# Patient Record
Sex: FEMALE | Race: WHITE | NOT HISPANIC OR LATINO | Employment: OTHER | URBAN - METROPOLITAN AREA
[De-identification: names, ages, dates, MRNs, and addresses within clinical notes are randomized per-mention and may not be internally consistent; named-entity substitution may affect disease eponyms.]

---

## 2017-02-14 ENCOUNTER — OFFICE VISIT (OUTPATIENT)
Dept: INTERNAL MEDICINE | Facility: MEDICAL CENTER | Age: 82
End: 2017-02-14
Payer: MEDICARE

## 2017-02-14 VITALS
HEART RATE: 98 BPM | DIASTOLIC BLOOD PRESSURE: 70 MMHG | BODY MASS INDEX: 23.25 KG/M2 | TEMPERATURE: 98.1 F | HEIGHT: 61 IN | WEIGHT: 123.13 LBS | SYSTOLIC BLOOD PRESSURE: 130 MMHG | OXYGEN SATURATION: 94 %

## 2017-02-14 DIAGNOSIS — N18.4 CKD (CHRONIC KIDNEY DISEASE) STAGE 4, GFR 15-29 ML/MIN (HCC): ICD-10-CM

## 2017-02-14 DIAGNOSIS — Z23 NEED FOR VACCINATION WITH 13-POLYVALENT PNEUMOCOCCAL CONJUGATE VACCINE: ICD-10-CM

## 2017-02-14 DIAGNOSIS — R54 FRAILTY: ICD-10-CM

## 2017-02-14 DIAGNOSIS — R25.2 CRAMP OF BOTH LOWER EXTREMITIES: ICD-10-CM

## 2017-02-14 DIAGNOSIS — R23.3 PETECHIAL RASH: ICD-10-CM

## 2017-02-14 DIAGNOSIS — D72.819 LEUKOPENIA, UNSPECIFIED TYPE: ICD-10-CM

## 2017-02-14 PROCEDURE — G0009 ADMIN PNEUMOCOCCAL VACCINE: HCPCS | Performed by: INTERNAL MEDICINE

## 2017-02-14 PROCEDURE — 90670 PCV13 VACCINE IM: CPT | Performed by: INTERNAL MEDICINE

## 2017-02-14 PROCEDURE — G8482 FLU IMMUNIZE ORDER/ADMIN: HCPCS | Performed by: INTERNAL MEDICINE

## 2017-02-14 PROCEDURE — 99214 OFFICE O/P EST MOD 30 MIN: CPT | Mod: 25 | Performed by: INTERNAL MEDICINE

## 2017-02-14 PROCEDURE — G8510 SCR DEP NEG, NO PLAN REQD: HCPCS | Performed by: INTERNAL MEDICINE

## 2017-02-14 PROCEDURE — G8420 CALC BMI NORM PARAMETERS: HCPCS | Performed by: INTERNAL MEDICINE

## 2017-02-14 PROCEDURE — 1036F TOBACCO NON-USER: CPT | Performed by: INTERNAL MEDICINE

## 2017-02-14 PROCEDURE — 1101F PT FALLS ASSESS-DOCD LE1/YR: CPT | Performed by: INTERNAL MEDICINE

## 2017-02-14 PROCEDURE — 4040F PNEUMOC VAC/ADMIN/RCVD: CPT | Performed by: INTERNAL MEDICINE

## 2017-02-14 ASSESSMENT — PATIENT HEALTH QUESTIONNAIRE - PHQ9
SUM OF ALL RESPONSES TO PHQ QUESTIONS 1-9: 0
CLINICAL INTERPRETATION OF PHQ2 SCORE: 0

## 2017-02-14 NOTE — MR AVS SNAPSHOT
"        Lela HOLLIDAY Ernesto   2017 3:40 PM   Office Visit   MRN: [id number]    Department:  [facility] - Internal Med   Dept Phone:  [phone]    Description:  Female : 1932   Provider:  Anabella Sanchez M.D.           Reason for Visit     Labs Only Results    Leg Pain Cramps in legs. At Night      Allergies as of 2017     Allergen Noted Reactions    Vicodin [Hydrocodone-Acetaminophen] 2016         You were diagnosed with     Need for vaccination with 13-polyvalent pneumococcal conjugate vaccine   [[id number]]         Vital Signs     Blood Pressure Pulse Temperature Height Weight Body Mass Index    130/70 mmHg 98 36.7 °C (98.1 °F) 1.549 m (5' 0.98\") 55.849 kg (123 lb 2 oz) 23.28 kg/m2    Oxygen Saturation Smoking Status                94% Former Smoker          Basic Information     Date Of Birth Sex Race Ethnicity Preferred Language    1932 Female White Non- English      Your appointments     May 08, 2017  9:40 AM   Established Patient with Anabella Sanchez M.D.   [facility] - Internal Medicine (--)    [address] [id number] [phone]           You will be receiving a confirmation call a few days before your appointment from our automated call confirmation system.              Problem List              ICD-10-CM Priority Class Noted - Resolved    Anemia D64.9   10/7/2016 - Present    Menopause Z78.0   10/7/2016 - Present    Osteopenia M85.80   10/7/2016 - Present    Weight loss R63.4   10/7/2016 - Present    Dyslipidemia (high LDL; low HDL) E78.4   10/7/2016 - Present    CKD (chronic kidney disease) stage 3, GFR 30-59 ml/min N18.3   10/7/2016 - Present    OA (osteoarthritis) M19.90   Unknown - Present    Leg cramps R25.2   Unknown - Present      Health Maintenance        Date Due Completion Dates    IMM DTaP/Tdap/Td Vaccine (1 - Tdap) 1951 ---    PAP SMEAR 1953 ---    IMM ZOSTER VACCINE 1992 ---    IMM PNEUMOCOCCAL 65+ (ADULT) " LOW/MEDIUM RISK SERIES (1 of 2 - PCV13) 5/28/1997 ---    MAMMOGRAM 2/20/2009 2/20/2008, 2/20/2008    BONE DENSITY 10/31/2021 10/31/2016    COLONOSCOPY 2/9/2027 2/9/2017 (N/S)    Override on 2/9/2017: (N/S) (PER [facility] NO COLONOSCOPU)            Current Immunizations     13-VALENT PCV PREVNAR 2/14/2017  4:46 PM    INFLUENZA VACCINE H1N1 1/5/2010    Influenza Vaccine Adult HD 10/7/2016    Influenza Vaccine Quad Inj (Preserved) 10/23/2014      Below and/or attached are the medications your provider expects you to take. Review all of your home medications and newly ordered medications with your provider and/or pharmacist. Follow medication instructions as directed by your provider and/or pharmacist. Please keep your medication list with you and share with your provider. Update the information when medications are discontinued, doses are changed, or new medications (including over-the-counter products) are added; and carry medication information at all times in the event of emergency situations     Allergies:  VICODIN - (reactions not documented)               Medications  Valid as of: February 14, 2017 -  4:47 PM    Generic Name Brand Name Tablet Size Instructions for use    Acetaminophen (Tab) TYLENOL 500 MG Take 1 Tab by mouth 2 Times a Day.        Aspirin (Tablet Delayed Response) ECOTRIN 81 MG Take 81 mg by mouth every day.        Cholecalciferol (Cap) Vitamin D3 1000 UNITS Take  by mouth.        Glucosamine-Chondroit-Vit C-Mn   Take  by mouth 2 Times a Day.        .                 Medicines prescribed today were sent to:     [facility] - [address] [address]    Phone: [phone] Fax: [phone]    Open 24 Hours?: No    [facility] [address] [place] [address] - [address] [place] [address]    Phone: [phone] Fax: [phone]    Open 24 Hours?: No      Medication refill instructions:       If your  prescription bottle indicates you have medication refills left, it is not necessary to call your provider’s office. Please contact your pharmacy and they will refill your medication.    If your prescription bottle indicates you do not have any refills left, you may request refills at any time through one of the following ways: The online [employer] system (except Urgent Care), by calling your provider’s office, or by asking your pharmacy to contact your provider’s office with a refill request. Medication refills are processed only during regular business hours and may not be available until the next business day. Your provider may request additional information or to have a follow-up visit with you prior to refilling your medication.   *Please Note: Medication refills are assigned a new Rx number when refilled electronically. Your pharmacy may indicate that no refills were authorized even though a new prescription for the same medication is available at the pharmacy. Please request the medicine by name with the pharmacy before contacting your provider for a refill.        Instructions      Stretch legs every night.    Ask kidney doctor if ok to use tonic water.           MyChart Status: Patient Declined
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
## 2017-05-08 ENCOUNTER — OFFICE VISIT (OUTPATIENT)
Dept: INTERNAL MEDICINE | Facility: MEDICAL CENTER | Age: 82
End: 2017-05-08
Payer: MEDICARE

## 2017-05-08 VITALS
OXYGEN SATURATION: 96 % | BODY MASS INDEX: 22.49 KG/M2 | HEIGHT: 61 IN | SYSTOLIC BLOOD PRESSURE: 134 MMHG | TEMPERATURE: 96.9 F | HEART RATE: 68 BPM | WEIGHT: 119.13 LBS | DIASTOLIC BLOOD PRESSURE: 84 MMHG

## 2017-05-08 DIAGNOSIS — Z63.4 BEREAVEMENT: ICD-10-CM

## 2017-05-08 DIAGNOSIS — M79.641 RIGHT HAND PAIN: ICD-10-CM

## 2017-05-08 DIAGNOSIS — G89.29 CHRONIC PAIN OF LEFT KNEE: ICD-10-CM

## 2017-05-08 DIAGNOSIS — M25.562 CHRONIC PAIN OF LEFT KNEE: ICD-10-CM

## 2017-05-08 DIAGNOSIS — R63.4 WEIGHT LOSS: ICD-10-CM

## 2017-05-08 PROCEDURE — 1036F TOBACCO NON-USER: CPT | Performed by: INTERNAL MEDICINE

## 2017-05-08 PROCEDURE — G8432 DEP SCR NOT DOC, RNG: HCPCS | Performed by: INTERNAL MEDICINE

## 2017-05-08 PROCEDURE — 4040F PNEUMOC VAC/ADMIN/RCVD: CPT | Performed by: INTERNAL MEDICINE

## 2017-05-08 PROCEDURE — G8420 CALC BMI NORM PARAMETERS: HCPCS | Performed by: INTERNAL MEDICINE

## 2017-05-08 PROCEDURE — 99214 OFFICE O/P EST MOD 30 MIN: CPT | Performed by: INTERNAL MEDICINE

## 2017-05-08 PROCEDURE — 1101F PT FALLS ASSESS-DOCD LE1/YR: CPT | Performed by: INTERNAL MEDICINE

## 2017-05-08 SDOH — SOCIAL STABILITY - SOCIAL INSECURITY: DISSAPEARANCE AND DEATH OF FAMILY MEMBER: Z63.4

## 2017-05-08 ASSESSMENT — PATIENT HEALTH QUESTIONNAIRE - PHQ9: CLINICAL INTERPRETATION OF PHQ2 SCORE: 0

## 2017-05-08 NOTE — PATIENT INSTRUCTIONS
Let me know name of cramp medication.     Let me know if you want Xrays of hand and knee.     We want you to gain weight. Make sure you eat three solid meals with snacks in between meals or fiver smaller meals everyday. Eat whatever you would like with the exception of high-salt foods. Come back in one month for a weight check. Come back sooner if needed.

## 2017-05-08 NOTE — MR AVS SNAPSHOT
"        Lela HOLLIDAY Ernesto   2017 9:40 AM   Office Visit   MRN: [id number]    Department:  [facility] - Internal Med   Dept Phone:  [phone]    Description:  Female : 1932   Provider:  Anabella Sanchez M.D.           Reason for Visit     Leg Pain Left side in the morning.  8 in pain scale.    Hand Pain Right. Has to put in the warm water to relieve it.     Weight Loss Pt noticed decrease in wt      Allergies as of 2017     Allergen Noted Reactions    Vicodin [Hydrocodone-Acetaminophen] 2016         You were diagnosed with     Right hand pain   [[id number]]       Chronic pain of left knee   [339004]       Weight loss   [244859]         Vital Signs     Blood Pressure Pulse Temperature Height Weight Body Mass Index    134/84 mmHg 68 36.1 °C (96.9 °F) 1.549 m (5' 1\") 54.035 kg (119 lb 2 oz) 22.52 kg/m2    Oxygen Saturation Smoking Status                96% Former Smoker          Basic Information     Date Of Birth Sex Race Ethnicity Preferred Language    1932 Female White Non- English      Problem List              ICD-10-CM Priority Class Noted - Resolved    Anemia D64.9   10/7/2016 - Present    Menopause Z78.0   10/7/2016 - Present    Osteopenia M85.80   10/7/2016 - Present    Weight loss R63.4   10/7/2016 - Present    Dyslipidemia (high LDL; low HDL) E78.4   10/7/2016 - Present    CKD (chronic kidney disease) stage 3, GFR 30-59 ml/min N18.3   10/7/2016 - Present    OA (osteoarthritis) M19.90   Unknown - Present    Leg cramps R25.2   Unknown - Present      Health Maintenance        Date Due Completion Dates    IMM DTaP/Tdap/Td Vaccine (1 - Tdap) 1951 ---    PAP SMEAR 1953 ---    IMM ZOSTER VACCINE 1992 ---    MAMMOGRAM 2009, 2008    IMM PNEUMOCOCCAL 65+ (ADULT) LOW/MEDIUM RISK SERIES (2 of 2 - PPSV23) 2018    BONE DENSITY 10/31/2021 10/31/2016    COLONOSCOPY 2027 (N/S)    Override on 2017: (N/S) (PER [facility] NO COLONOSCOPU)         "   Current Immunizations     13-VALENT PCV PREVNAR 2/14/2017  4:46 PM    INFLUENZA VACCINE H1N1 1/5/2010    Influenza Vaccine Adult HD 10/7/2016    Influenza Vaccine Quad Inj (Preserved) 10/23/2014      Below and/or attached are the medications your provider expects you to take. Review all of your home medications and newly ordered medications with your provider and/or pharmacist. Follow medication instructions as directed by your provider and/or pharmacist. Please keep your medication list with you and share with your provider. Update the information when medications are discontinued, doses are changed, or new medications (including over-the-counter products) are added; and carry medication information at all times in the event of emergency situations     Allergies:  VICODIN - (reactions not documented)               Medications  Valid as of: May 08, 2017 - 10:51 AM    Generic Name Brand Name Tablet Size Instructions for use    Acetaminophen (Tab) TYLENOL 500 MG Take 1 Tab by mouth 2 Times a Day.        Aspirin (Tablet Delayed Response) ECOTRIN 81 MG Take 81 mg by mouth every day.        Cholecalciferol (Cap) Vitamin D3 1000 UNITS Take  by mouth.        Glucosamine-Chondroit-Vit C-Mn   Take  by mouth 2 Times a Day.        .                 Medicines prescribed today were sent to:     [facility] - [address] [address]    Phone: [phone] Fax: [phone]    Open 24 Hours?: No    [facility] [address] [place] [address]    Phone: [phone] Fax: [phone]    Open 24 Hours?: No      Medication refill instructions:       If your prescription bottle indicates you have medication refills left, it is not necessary to call your provider’s office. Please contact your pharmacy and they will refill your medication.    If your prescription bottle indicates you do not have any  refills left, you may request refills at any time through one of the following ways: The online [employer] system (except Urgent Care), by calling your provider’s office, or by asking your pharmacy to contact your provider’s office with a refill request. Medication refills are processed only during regular business hours and may not be available until the next business day. Your provider may request additional information or to have a follow-up visit with you prior to refilling your medication.   *Please Note: Medication refills are assigned a new Rx number when refilled electronically. Your pharmacy may indicate that no refills were authorized even though a new prescription for the same medication is available at the pharmacy. Please request the medicine by name with the pharmacy before contacting your provider for a refill.        Instructions      Let me know name of cramp medication.     Let me know if you want Xrays of hand and knee.     We want you to gain weight. Make sure you eat three solid meals with snacks in between meals or fiver smaller meals everyday. Eat whatever you would like with the exception of high-salt foods. Come back in one month for a weight check. Come back sooner if needed.                 [facility] Status: Patient Declined
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
## 2017-06-08 ENCOUNTER — OFFICE VISIT (OUTPATIENT)
Dept: INTERNAL MEDICINE | Facility: MEDICAL CENTER | Age: 82
End: 2017-06-08
Payer: MEDICARE

## 2017-06-08 VITALS
OXYGEN SATURATION: 94 % | DIASTOLIC BLOOD PRESSURE: 66 MMHG | HEART RATE: 79 BPM | WEIGHT: 117.25 LBS | TEMPERATURE: 97.5 F | SYSTOLIC BLOOD PRESSURE: 126 MMHG | BODY MASS INDEX: 22.14 KG/M2 | HEIGHT: 61 IN

## 2017-06-08 DIAGNOSIS — R25.2 CRAMP OF BOTH LOWER EXTREMITIES: ICD-10-CM

## 2017-06-08 DIAGNOSIS — R63.4 WEIGHT LOSS: ICD-10-CM

## 2017-06-08 DIAGNOSIS — G89.29 CHRONIC PAIN OF LEFT KNEE: ICD-10-CM

## 2017-06-08 DIAGNOSIS — M25.562 CHRONIC PAIN OF LEFT KNEE: ICD-10-CM

## 2017-06-08 PROCEDURE — 99215 OFFICE O/P EST HI 40 MIN: CPT | Performed by: INTERNAL MEDICINE

## 2017-06-08 RX ORDER — ACETAMINOPHEN 500 MG
1000 TABLET ORAL 2 TIMES DAILY
Qty: 30 TAB | COMMUNITY
Start: 2017-06-08 | End: 2017-11-02

## 2017-06-08 NOTE — PATIENT INSTRUCTIONS
Some labs and chest Xray.  Get leg Xray.  We want you to gain weight. Make sure you eat three solid meals with snacks in between meals or fiver smaller meals everyday. Eat whatever you would like with the exception of high-salt foods. Come back in one month for a weight check. Come back sooner if needed.   Think about how much you want to do re: weight loss eval - e.g., mammogram, colonoscopy, etc.  Keep a food journal.
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
## 2017-06-08 NOTE — MR AVS SNAPSHOT
"Lela Orozco   2017 10:40 AM   Office Visit   MRN: [id number]    Department:  [facility] Med - Internal Med   Dept Phone:  [phone]    Description:  Female : 1932   Provider:  Anabella Sanchez M.D.           Reason for Visit     Leg Pain Left side      Allergies as of 2017     Allergen Noted Reactions    Vicodin [Hydrocodone-Acetaminophen] 2016         You were diagnosed with     Chronic pain of left knee   [903638]       Cramp of both lower extremities   [0584348]       Weight loss   [160541]         Vital Signs     Blood Pressure Pulse Temperature Height Weight Body Mass Index    126/66 mmHg 79 36.4 °C (97.5 °F) 1.549 m (5' 0.98\") 53.184 kg (117 lb 4 oz) 22.17 kg/m2    Oxygen Saturation Smoking Status                94% Former Smoker          Basic Information     Date Of Birth Sex Race Ethnicity Preferred Language    1932 Female White Non- English      Your appointments     2017 11:00 AM   Established Patient with Anabella Sanchez M.D.   [facility] Med - Internal Medicine (--)    [address] [id number] [phone]           You will be receiving a confirmation call a few days before your appointment from our automated call confirmation system.              Problem List              ICD-10-CM Priority Class Noted - Resolved    Anemia D64.9   10/7/2016 - Present    Menopause Z78.0   10/7/2016 - Present    Osteopenia M85.80   10/7/2016 - Present    Weight loss R63.4   10/7/2016 - Present    Dyslipidemia (high LDL; low HDL) E78.4   10/7/2016 - Present    CKD (chronic kidney disease) stage 3, GFR 30-59 ml/min N18.3   10/7/2016 - Present    OA (osteoarthritis) M19.90   Unknown - Present    Leg cramps R25.2   Unknown - Present      Health Maintenance        Date Due Completion Dates    IMM DTaP/Tdap/Td Vaccine (1 - Tdap) 1951 ---    PAP SMEAR 1953 ---    IMM ZOSTER VACCINE 1992 ---    MAMMOGRAM 2009, " 2/20/2008    IMM PNEUMOCOCCAL 65+ (ADULT) LOW/MEDIUM RISK SERIES (2 of 2 - PPSV23) 2/14/2018 2/14/2017    BONE DENSITY 10/31/2021 10/31/2016    COLONOSCOPY 2/9/2027 2/9/2017 (N/S)    Override on 2/9/2017: (N/S) (PER [facility] NO COLONOSCOPU)            Current Immunizations     13-VALENT PCV PREVNAR 2/14/2017  4:46 PM    INFLUENZA VACCINE H1N1 1/5/2010    Influenza Vaccine Adult HD 10/7/2016    Influenza Vaccine Quad Inj (Preserved) 10/23/2014      Below and/or attached are the medications your provider expects you to take. Review all of your home medications and newly ordered medications with your provider and/or pharmacist. Follow medication instructions as directed by your provider and/or pharmacist. Please keep your medication list with you and share with your provider. Update the information when medications are discontinued, doses are changed, or new medications (including over-the-counter products) are added; and carry medication information at all times in the event of emergency situations     Allergies:  VICODIN - (reactions not documented)               Medications  Valid as of: June 08, 2017 - 11:27 AM    Generic Name Brand Name Tablet Size Instructions for use    Acetaminophen (Tab) TYLENOL 500 MG Take 2 Tabs by mouth 2 Times a Day.        Aspirin (Tablet Delayed Response) ECOTRIN 81 MG Take 81 mg by mouth every day.        Cholecalciferol (Cap) Vitamin D3 1000 UNITS Take  by mouth.        Glucosamine-Chondroit-Vit C-Mn   Take  by mouth 2 Times a Day.        NON SPECIFIED   Dwight's Leg cramps 3 tabs SL qday prn        .                 Medicines prescribed today were sent to:     [facility] - [place], [place] - [address] [place] [address]    [address] [facility] [address]    Phone: [phone] Fax: [phone]    Open 24 Hours?: No    [facility] [place] - [address] [address]    Phone: [phone] Fax: [phone]    Open 24  Hours?: No      Medication refill instructions:       If your prescription bottle indicates you have medication refills left, it is not necessary to call your provider’s office. Please contact your pharmacy and they will refill your medication.    If your prescription bottle indicates you do not have any refills left, you may request refills at any time through one of the following ways: The online [employer] video system (except Urgent Care), by calling your provider’s office, or by asking your pharmacy to contact your provider’s office with a refill request. Medication refills are processed only during regular business hours and may not be available until the next business day. Your provider may request additional information or to have a follow-up visit with you prior to refilling your medication.   *Please Note: Medication refills are assigned a new Rx number when refilled electronically. Your pharmacy may indicate that no refills were authorized even though a new prescription for the same medication is available at the pharmacy. Please request the medicine by name with the pharmacy before contacting your provider for a refill.        Your To Do List     Future Labs/Procedures Complete By Expires    CBC WITH DIFFERENTIAL  As directed 6/9/2018    COMP METABOLIC PANEL  As directed 6/9/2018    CRP QUANTITIVE (NON-CARDIAC)  As directed 6/9/2018    DX-CHEST-2 VIEWS  As directed 6/8/2018    DX-KNEE 3 VIEWS LEFT  As directed 6/8/2018    TSH WITH REFLEX TO FT4  As directed 6/8/2018    URINALYSIS,CULTURE IF INDICATED  As directed 6/9/2018    WESTERGREN SED RATE  As directed 6/8/2018      Instructions    Some labs and chest Xray.  Get leg Xray.  We want you to gain weight. Make sure you eat three solid meals with snacks in between meals or fiver smaller meals everyday. Eat whatever you would like with the exception of high-salt foods. Come back in one month for a weight check. Come back sooner if needed.   Think about how much you  want to do re: weight loss eval - e.g., mammogram, colonoscopy, etc.  Keep a food journal.              MyChart Status: Patient Declined

## 2017-06-26 ENCOUNTER — HOSPITAL ENCOUNTER (OUTPATIENT)
Dept: LAB | Facility: MEDICAL CENTER | Age: 82
End: 2017-06-26
Attending: INTERNAL MEDICINE
Payer: MEDICARE

## 2017-06-26 ENCOUNTER — APPOINTMENT (OUTPATIENT)
Dept: RADIOLOGY | Facility: MEDICAL CENTER | Age: 82
End: 2017-06-26
Attending: INTERNAL MEDICINE
Payer: MEDICARE

## 2017-06-26 DIAGNOSIS — R63.4 WEIGHT LOSS: ICD-10-CM

## 2017-06-26 DIAGNOSIS — M25.562 CHRONIC PAIN OF LEFT KNEE: ICD-10-CM

## 2017-06-26 DIAGNOSIS — G89.29 CHRONIC PAIN OF LEFT KNEE: ICD-10-CM

## 2017-06-26 LAB
ALBUMIN SERPL BCP-MCNC: 4.2 G/DL (ref 3.2–4.9)
ALBUMIN/GLOB SERPL: 1.6 G/DL
ALP SERPL-CCNC: 51 U/L (ref 30–99)
ALT SERPL-CCNC: 17 U/L (ref 2–50)
ANION GAP SERPL CALC-SCNC: 7 MMOL/L (ref 0–11.9)
APPEARANCE UR: ABNORMAL
AST SERPL-CCNC: 23 U/L (ref 12–45)
BACTERIA #/AREA URNS HPF: ABNORMAL /HPF
BASOPHILS # BLD AUTO: 0.8 % (ref 0–1.8)
BASOPHILS # BLD: 0.03 K/UL (ref 0–0.12)
BILIRUB SERPL-MCNC: 0.8 MG/DL (ref 0.1–1.5)
BILIRUB UR QL STRIP.AUTO: NEGATIVE
BUN SERPL-MCNC: 28 MG/DL (ref 8–22)
CALCIUM SERPL-MCNC: 9.8 MG/DL (ref 8.5–10.5)
CHLORIDE SERPL-SCNC: 106 MMOL/L (ref 96–112)
CO2 SERPL-SCNC: 26 MMOL/L (ref 20–33)
COLOR UR: YELLOW
CREAT SERPL-MCNC: 1.4 MG/DL (ref 0.5–1.4)
CRP SERPL HS-MCNC: 0.15 MG/DL (ref 0–0.75)
CULTURE IF INDICATED INDCX: NO UA CULTURE
EOSINOPHIL # BLD AUTO: 0.08 K/UL (ref 0–0.51)
EOSINOPHIL NFR BLD: 2.2 % (ref 0–6.9)
EPI CELLS #/AREA URNS HPF: ABNORMAL /HPF
ERYTHROCYTE [DISTWIDTH] IN BLOOD BY AUTOMATED COUNT: 41.9 FL (ref 35.9–50)
ERYTHROCYTE [SEDIMENTATION RATE] IN BLOOD BY WESTERGREN METHOD: 14 MM/HOUR (ref 0–30)
GFR SERPL CREATININE-BSD FRML MDRD: 36 ML/MIN/1.73 M 2
GLOBULIN SER CALC-MCNC: 2.7 G/DL (ref 1.9–3.5)
GLUCOSE SERPL-MCNC: 92 MG/DL (ref 65–99)
GLUCOSE UR STRIP.AUTO-MCNC: NEGATIVE MG/DL
HCT VFR BLD AUTO: 38.7 % (ref 37–47)
HGB BLD-MCNC: 12.3 G/DL (ref 12–16)
HYALINE CASTS #/AREA URNS LPF: ABNORMAL /LPF
IMM GRANULOCYTES # BLD AUTO: 0.01 K/UL (ref 0–0.11)
IMM GRANULOCYTES NFR BLD AUTO: 0.3 % (ref 0–0.9)
KETONES UR STRIP.AUTO-MCNC: NEGATIVE MG/DL
LEUKOCYTE ESTERASE UR QL STRIP.AUTO: NEGATIVE
LYMPHOCYTES # BLD AUTO: 1 K/UL (ref 1–4.8)
LYMPHOCYTES NFR BLD: 27.5 % (ref 22–41)
MCH RBC QN AUTO: 26.7 PG (ref 27–33)
MCHC RBC AUTO-ENTMCNC: 31.8 G/DL (ref 33.6–35)
MCV RBC AUTO: 83.9 FL (ref 81.4–97.8)
MICRO URNS: ABNORMAL
MONOCYTES # BLD AUTO: 0.4 K/UL (ref 0–0.85)
MONOCYTES NFR BLD AUTO: 11 % (ref 0–13.4)
NEUTROPHILS # BLD AUTO: 2.11 K/UL (ref 2–7.15)
NEUTROPHILS NFR BLD: 58.2 % (ref 44–72)
NITRITE UR QL STRIP.AUTO: NEGATIVE
NRBC # BLD AUTO: 0 K/UL
NRBC BLD AUTO-RTO: 0 /100 WBC
PH UR STRIP.AUTO: 5.5 [PH]
PLATELET # BLD AUTO: 197 K/UL (ref 164–446)
PMV BLD AUTO: 10.5 FL (ref 9–12.9)
POTASSIUM SERPL-SCNC: 4.3 MMOL/L (ref 3.6–5.5)
PROT SERPL-MCNC: 6.9 G/DL (ref 6–8.2)
PROT UR QL STRIP: 30 MG/DL
RBC # BLD AUTO: 4.61 M/UL (ref 4.2–5.4)
RBC # URNS HPF: ABNORMAL /HPF
RBC UR QL AUTO: NEGATIVE
SODIUM SERPL-SCNC: 139 MMOL/L (ref 135–145)
SP GR UR STRIP.AUTO: 1.02
TSH SERPL DL<=0.005 MIU/L-ACNC: 1.48 UIU/ML (ref 0.3–3.7)
URATE CRY #/AREA URNS HPF: ABNORMAL /HPF
WBC # BLD AUTO: 3.6 K/UL (ref 4.8–10.8)
WBC #/AREA URNS HPF: ABNORMAL /HPF

## 2017-06-26 PROCEDURE — 81001 URINALYSIS AUTO W/SCOPE: CPT

## 2017-06-26 PROCEDURE — 85025 COMPLETE CBC W/AUTO DIFF WBC: CPT

## 2017-06-26 PROCEDURE — 80053 COMPREHEN METABOLIC PANEL: CPT

## 2017-06-26 PROCEDURE — 86140 C-REACTIVE PROTEIN: CPT

## 2017-06-26 PROCEDURE — 84443 ASSAY THYROID STIM HORMONE: CPT

## 2017-06-26 PROCEDURE — 85652 RBC SED RATE AUTOMATED: CPT

## 2017-06-26 PROCEDURE — 36415 COLL VENOUS BLD VENIPUNCTURE: CPT

## 2017-06-26 PROCEDURE — 71020 DX-CHEST-2 VIEWS: CPT

## 2017-07-03 ENCOUNTER — OFFICE VISIT (OUTPATIENT)
Dept: INTERNAL MEDICINE | Facility: MEDICAL CENTER | Age: 82
End: 2017-07-03
Payer: MEDICARE

## 2017-07-03 VITALS
OXYGEN SATURATION: 94 % | DIASTOLIC BLOOD PRESSURE: 60 MMHG | HEART RATE: 82 BPM | TEMPERATURE: 97.3 F | HEIGHT: 61 IN | SYSTOLIC BLOOD PRESSURE: 110 MMHG | BODY MASS INDEX: 21.86 KG/M2 | WEIGHT: 115.8 LBS

## 2017-07-03 DIAGNOSIS — M25.562 CHRONIC PAIN OF LEFT KNEE: ICD-10-CM

## 2017-07-03 DIAGNOSIS — R93.89 ABNORMAL CHEST X-RAY: ICD-10-CM

## 2017-07-03 DIAGNOSIS — R31.29 MICROSCOPIC HEMATURIA: ICD-10-CM

## 2017-07-03 DIAGNOSIS — N18.4 CKD (CHRONIC KIDNEY DISEASE) STAGE 4, GFR 15-29 ML/MIN (HCC): ICD-10-CM

## 2017-07-03 DIAGNOSIS — R63.4 WEIGHT LOSS: ICD-10-CM

## 2017-07-03 DIAGNOSIS — G89.29 CHRONIC PAIN OF LEFT KNEE: ICD-10-CM

## 2017-07-03 DIAGNOSIS — D72.819 LEUKOPENIA, UNSPECIFIED TYPE: ICD-10-CM

## 2017-07-03 PROCEDURE — 99214 OFFICE O/P EST MOD 30 MIN: CPT | Performed by: INTERNAL MEDICINE

## 2017-07-03 NOTE — MR AVS SNAPSHOT
"Lela Orozco   7/3/2017 10:40 AM   Office Visit   MRN: [id number]    Department:  [facility] Med - Internal Med   Dept Phone:  [phone]    Description:  Female : 1932   Provider:  Anabella Sanchez M.D.           Reason for Visit     Follow-Up weight loss    Results imaging, labs      Allergies as of 7/3/2017     Allergen Noted Reactions    Vicodin [Hydrocodone-Acetaminophen] 2016         You were diagnosed with     Weight loss   [273849]       Microscopic hematuria   [599.72.ICD-9-CM]         Vital Signs     Blood Pressure Pulse Temperature Height Weight Body Mass Index    110/60 mmHg 82 36.3 °C (97.3 °F) 1.549 m (5' 0.98\") 52.527 kg (115 lb 12.8 oz) 21.89 kg/m2    Oxygen Saturation Smoking Status                94% Former Smoker          Basic Information     Date Of Birth Sex Race Ethnicity Preferred Language    1932 Female White Non- English      Your appointments     Aug 14, 2017 10:40 AM   Established Patient with Anabella Sanchez M.D.   [facility] Med - Internal Medicine (--)    [address] [id number] [phone]           You will be receiving a confirmation call a few days before your appointment from our automated call confirmation system.              Problem List              ICD-10-CM Priority Class Noted - Resolved    Anemia D64.9   10/7/2016 - Present    Menopause Z78.0   10/7/2016 - Present    Osteopenia M85.80   10/7/2016 - Present    Weight loss R63.4   10/7/2016 - Present    Dyslipidemia (high LDL; low HDL) E78.4   10/7/2016 - Present    CKD (chronic kidney disease) stage 3, GFR 30-59 ml/min N18.3   10/7/2016 - Present    OA (osteoarthritis) M19.90   Unknown - Present    Leg cramps R25.2   Unknown - Present      Health Maintenance        Date Due Completion Dates    IMM DTaP/Tdap/Td Vaccine (1 - Tdap) 1951 ---    PAP SMEAR 1953 ---    IMM ZOSTER VACCINE 1992 ---    MAMMOGRAM 2009, 2008    IMM " INFLUENZA (1) 9/1/2017 10/7/2016, 10/23/2014    IMM PNEUMOCOCCAL 65+ (ADULT) LOW/MEDIUM RISK SERIES (2 of 2 - PPSV23) 2/14/2018 2/14/2017    BONE DENSITY 10/31/2021 10/31/2016    COLONOSCOPY 2/9/2027 2/9/2017 (N/S)    Override on 2/9/2017: (N/S) (PER [facility]IC NO COLONOSCOPU)            Current Immunizations     13-VALENT PCV PREVNAR 2/14/2017  4:46 PM    INFLUENZA VACCINE H1N1 1/5/2010    Influenza Vaccine Adult HD 10/7/2016    Influenza Vaccine Quad Inj (Preserved) 10/23/2014      Below and/or attached are the medications your provider expects you to take. Review all of your home medications and newly ordered medications with your provider and/or pharmacist. Follow medication instructions as directed by your provider and/or pharmacist. Please keep your medication list with you and share with your provider. Update the information when medications are discontinued, doses are changed, or new medications (including over-the-counter products) are added; and carry medication information at all times in the event of emergency situations     Allergies:  VICODIN - (reactions not documented)               Medications  Valid as of: July 03, 2017 - 11:48 AM    Generic Name Brand Name Tablet Size Instructions for use    Acetaminophen (Tab) TYLENOL 500 MG Take 2 Tabs by mouth 2 Times a Day.        Aspirin (Tablet Delayed Response) ECOTRIN 81 MG Take 81 mg by mouth every day.        Cholecalciferol (Cap) Vitamin D3 1000 UNITS Take  by mouth.        Glucosamine-Chondroit-Vit C-Mn   Take  by mouth 2 Times a Day.        NON SPECIFIED   Dwight's Leg cramps 3 tabs SL qday prn        .                 Medicines prescribed today were sent to:     [facility] - [place], [place] - [address] [facility]    [address] [facility] [address]    Phone: [phone] Fax: [phone]    Open 24 Hours?: No    [facility] - [place], [place] - [address] [place] [address]    Phone:  [phone] Fax: [phone]    Open 24 Hours?: No      Medication refill instructions:       If your prescription bottle indicates you have medication refills left, it is not necessary to call your provider’s office. Please contact your pharmacy and they will refill your medication.    If your prescription bottle indicates you do not have any refills left, you may request refills at any time through one of the following ways: The online [employer] system (except Urgent Care), by calling your provider’s office, or by asking your pharmacy to contact your provider’s office with a refill request. Medication refills are processed only during regular business hours and may not be available until the next business day. Your provider may request additional information or to have a follow-up visit with you prior to refilling your medication.   *Please Note: Medication refills are assigned a new Rx number when refilled electronically. Your pharmacy may indicate that no refills were authorized even though a new prescription for the same medication is available at the pharmacy. Please request the medicine by name with the pharmacy before contacting your provider for a refill.        Your To Do List     Future Labs/Procedures Complete By Expires    URINALYSIS,CULTURE IF INDICATED  As directed 7/4/2018      Instructions    Come back in 6 weeks.  Come back sooner if needed.   We want you to gain weight. Make sure you eat three solid meals with snacks in between meals or fiver smaller meals everyday. Eat whatever you would like with the exception of high-salt foods. Come back in one month to 6 weeks for a weight check. Come back sooner if needed.                 [employer] Status: Patient Declined

## 2017-07-03 NOTE — PATIENT INSTRUCTIONS
Come back in 6 weeks.  Come back sooner if needed.   We want you to gain weight. Make sure you eat three solid meals with snacks in between meals or fiver smaller meals everyday. Eat whatever you would like with the exception of high-salt foods. Come back in one month to 6 weeks for a weight check. Come back sooner if needed.
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
## 2017-08-08 ENCOUNTER — HOSPITAL ENCOUNTER (OUTPATIENT)
Dept: LAB | Facility: MEDICAL CENTER | Age: 82
End: 2017-08-08
Attending: INTERNAL MEDICINE
Payer: MEDICARE

## 2017-08-08 LAB
ALBUMIN SERPL BCP-MCNC: 4 G/DL (ref 3.2–4.9)
ALBUMIN/GLOB SERPL: 1.3 G/DL
ALP SERPL-CCNC: 57 U/L (ref 30–99)
ALT SERPL-CCNC: 18 U/L (ref 2–50)
ANION GAP SERPL CALC-SCNC: 8 MMOL/L (ref 0–11.9)
AST SERPL-CCNC: 27 U/L (ref 12–45)
BASOPHILS # BLD AUTO: 0.8 % (ref 0–1.8)
BASOPHILS # BLD: 0.03 K/UL (ref 0–0.12)
BILIRUB SERPL-MCNC: 0.5 MG/DL (ref 0.1–1.5)
BUN SERPL-MCNC: 25 MG/DL (ref 8–22)
CALCIUM SERPL-MCNC: 9.7 MG/DL (ref 8.5–10.5)
CHLORIDE SERPL-SCNC: 105 MMOL/L (ref 96–112)
CO2 SERPL-SCNC: 25 MMOL/L (ref 20–33)
CREAT SERPL-MCNC: 1.36 MG/DL (ref 0.5–1.4)
EOSINOPHIL # BLD AUTO: 0.24 K/UL (ref 0–0.51)
EOSINOPHIL NFR BLD: 6.4 % (ref 0–6.9)
ERYTHROCYTE [DISTWIDTH] IN BLOOD BY AUTOMATED COUNT: 43.1 FL (ref 35.9–50)
GFR SERPL CREATININE-BSD FRML MDRD: 37 ML/MIN/1.73 M 2
GLOBULIN SER CALC-MCNC: 3 G/DL (ref 1.9–3.5)
GLUCOSE SERPL-MCNC: 86 MG/DL (ref 65–99)
HCT VFR BLD AUTO: 37.4 % (ref 37–47)
HGB BLD-MCNC: 11.9 G/DL (ref 12–16)
IMM GRANULOCYTES # BLD AUTO: 0.01 K/UL (ref 0–0.11)
IMM GRANULOCYTES NFR BLD AUTO: 0.3 % (ref 0–0.9)
LYMPHOCYTES # BLD AUTO: 0.93 K/UL (ref 1–4.8)
LYMPHOCYTES NFR BLD: 24.8 % (ref 22–41)
MAGNESIUM SERPL-MCNC: 2.1 MG/DL (ref 1.5–2.5)
MCH RBC QN AUTO: 26.9 PG (ref 27–33)
MCHC RBC AUTO-ENTMCNC: 31.8 G/DL (ref 33.6–35)
MCV RBC AUTO: 84.6 FL (ref 81.4–97.8)
MONOCYTES # BLD AUTO: 0.46 K/UL (ref 0–0.85)
MONOCYTES NFR BLD AUTO: 12.3 % (ref 0–13.4)
NEUTROPHILS # BLD AUTO: 2.08 K/UL (ref 2–7.15)
NEUTROPHILS NFR BLD: 55.4 % (ref 44–72)
NRBC # BLD AUTO: 0 K/UL
NRBC BLD AUTO-RTO: 0 /100 WBC
PHOSPHATE SERPL-MCNC: 3.7 MG/DL (ref 2.5–4.5)
PLATELET # BLD AUTO: 218 K/UL (ref 164–446)
PMV BLD AUTO: 10.1 FL (ref 9–12.9)
POTASSIUM SERPL-SCNC: 4.2 MMOL/L (ref 3.6–5.5)
PROT SERPL-MCNC: 7 G/DL (ref 6–8.2)
RBC # BLD AUTO: 4.42 M/UL (ref 4.2–5.4)
SODIUM SERPL-SCNC: 138 MMOL/L (ref 135–145)
URATE SERPL-MCNC: 6.7 MG/DL (ref 1.9–8.2)
WBC # BLD AUTO: 3.8 K/UL (ref 4.8–10.8)

## 2017-08-08 PROCEDURE — 80053 COMPREHEN METABOLIC PANEL: CPT

## 2017-08-08 PROCEDURE — 84550 ASSAY OF BLOOD/URIC ACID: CPT

## 2017-08-08 PROCEDURE — 83735 ASSAY OF MAGNESIUM: CPT

## 2017-08-08 PROCEDURE — 85025 COMPLETE CBC W/AUTO DIFF WBC: CPT

## 2017-08-08 PROCEDURE — 84100 ASSAY OF PHOSPHORUS: CPT

## 2017-08-08 PROCEDURE — 36415 COLL VENOUS BLD VENIPUNCTURE: CPT

## 2017-08-14 ENCOUNTER — OFFICE VISIT (OUTPATIENT)
Dept: INTERNAL MEDICINE | Facility: MEDICAL CENTER | Age: 82
End: 2017-08-14
Payer: MEDICARE

## 2017-08-14 VITALS
HEIGHT: 61 IN | WEIGHT: 115.38 LBS | TEMPERATURE: 98.2 F | BODY MASS INDEX: 21.79 KG/M2 | OXYGEN SATURATION: 95 % | HEART RATE: 95 BPM | DIASTOLIC BLOOD PRESSURE: 84 MMHG | SYSTOLIC BLOOD PRESSURE: 122 MMHG

## 2017-08-14 DIAGNOSIS — R63.4 WEIGHT LOSS: ICD-10-CM

## 2017-08-14 DIAGNOSIS — K59.00 CONSTIPATION, UNSPECIFIED CONSTIPATION TYPE: ICD-10-CM

## 2017-08-14 DIAGNOSIS — N18.30 CKD (CHRONIC KIDNEY DISEASE) STAGE 3, GFR 30-59 ML/MIN (HCC): ICD-10-CM

## 2017-08-14 DIAGNOSIS — R05.8 DRY COUGH: ICD-10-CM

## 2017-08-14 PROCEDURE — 99214 OFFICE O/P EST MOD 30 MIN: CPT | Performed by: INTERNAL MEDICINE
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
## 2017-08-14 NOTE — MR AVS SNAPSHOT
"Lela Orozco   2017 10:40 AM   Office Visit   MRN: [id number]    Department:  [facility] Med - Internal Med   Dept Phone:  [phone]    Description:  Female : 1932   Provider:  Anabella Sanchez M.D.           Reason for Visit     Labs Only Results      Allergies as of 2017     Allergen Noted Reactions    Vicodin [Hydrocodone-Acetaminophen] 2016         You were diagnosed with     Dry cough   [326840]       Weight loss   [644952]       Constipation, unspecified constipation type   [0580715]         Vital Signs     Blood Pressure Pulse Temperature Height Weight Body Mass Index    122/84 mmHg 95 36.8 °C (98.2 °F) 1.549 m (5' 0.98\") 52.334 kg (115 lb 6 oz) 21.81 kg/m2    Oxygen Saturation Smoking Status                95% Former Smoker          Basic Information     Date Of Birth Sex Race Ethnicity Preferred Language    1932 Female White Non- English      Your appointments     2017  4:20 PM   Established Patient with Anabella Sanchez M.D.   [facility] Med - Internal Medicine (--)    [address] [place] [id number] [phone]           You will be receiving a confirmation call a few days before your appointment from our automated call confirmation system.            Dec 22, 2017 11:00 AM   Established Patient with Anabella Sanchez M.D.   [facility] - Internal Medicine (--)    [address] [place] [id number] [phone]           You will be receiving a confirmation call a few days before your appointment from our automated call confirmation system.              Problem List              ICD-10-CM Priority Class Noted - Resolved    Anemia D64.9   10/7/2016 - Present    Menopause Z78.0   10/7/2016 - Present    Osteopenia M85.80   10/7/2016 - Present    Weight loss R63.4   10/7/2016 - Present    Dyslipidemia (high LDL; low HDL) E78.4   10/7/2016 - Present    CKD (chronic kidney disease) stage 3, GFR 30-59 " ml/min N18.3   10/7/2016 - Present    OA (osteoarthritis) M19.90   Unknown - Present    Leg cramps R25.2   Unknown - Present      Health Maintenance        Date Due Completion Dates    IMM DTaP/Tdap/Td Vaccine (1 - Tdap) 5/28/1951 ---    PAP SMEAR 5/28/1953 ---    IMM ZOSTER VACCINE 5/28/1992 ---    MAMMOGRAM 2/20/2009 2/20/2008, 2/20/2008    IMM INFLUENZA (1) 9/1/2017 10/7/2016, 10/23/2014    IMM PNEUMOCOCCAL 65+ (ADULT) LOW/MEDIUM RISK SERIES (2 of 2 - PPSV23) 2/14/2018 2/14/2017    BONE DENSITY 10/31/2021 10/31/2016    COLONOSCOPY 2/9/2027 2/9/2017 (N/S)    Override on 2/9/2017: (N/S) (PER GIC NO COLONOSCOPU)            Current Immunizations     13-VALENT PCV PREVNAR 2/14/2017  4:46 PM    INFLUENZA VACCINE H1N1 1/5/2010    Influenza Vaccine Adult HD 10/7/2016    Influenza Vaccine Quad Inj (Preserved) 10/23/2014      Below and/or attached are the medications your provider expects you to take. Review all of your home medications and newly ordered medications with your provider and/or pharmacist. Follow medication instructions as directed by your provider and/or pharmacist. Please keep your medication list with you and share with your provider. Update the information when medications are discontinued, doses are changed, or new medications (including over-the-counter products) are added; and carry medication information at all times in the event of emergency situations     Allergies:  VICODIN - (reactions not documented)               Medications  Valid as of: August 14, 2017 - 11:27 AM    Generic Name Brand Name Tablet Size Instructions for use    Acetaminophen (Tab) TYLENOL 500 MG Take 2 Tabs by mouth 2 Times a Day.        Aspirin (Tablet Delayed Response) ECOTRIN 81 MG Take 81 mg by mouth every day.        Cholecalciferol (Cap) Vitamin D3 1000 UNITS Take  by mouth.        Glucosamine-Chondroit-Vit C-Mn   Take  by mouth 2 Times a Day.        NON SPECIFIED   Dwight's Leg cramps 3 tabs SL qday prn        .                  Medicines prescribed today were sent to:     [facility] - [place], [place] - [address]    [address] [address]    Phone: [phone] Fax: [phone]    Open 24 Hours?: No    [facility] - [place], [place] - [address] [place] [address]    Phone: [phone] Fax: [phone]    Open 24 Hours?: No      Medication refill instructions:       If your prescription bottle indicates you have medication refills left, it is not necessary to call your provider’s office. Please contact your pharmacy and they will refill your medication.    If your prescription bottle indicates you do not have any refills left, you may request refills at any time through one of the following ways: The online [employer] system (except Urgent Care), by calling your provider’s office, or by asking your pharmacy to contact your provider’s office with a refill request. Medication refills are processed only during regular business hours and may not be available until the next business day. Your provider may request additional information or to have a follow-up visit with you prior to refilling your medication.   *Please Note: Medication refills are assigned a new Rx number when refilled electronically. Your pharmacy may indicate that no refills were authorized even though a new prescription for the same medication is available at the pharmacy. Please request the medicine by name with the pharmacy before contacting your provider for a refill.        Your To Do List     Future Labs/Procedures Complete By Expires    OCCULT BLOOD FECES IMMUNOASSAY  As directed 8/14/2018      Referral     A referral request has been sent to our patient care coordination department. Please allow 3-5 business days for us to process this request and contact you either by phone or mail. If you do not hear from us by the 5th business day, please call us at [phone].           Instructions    I recommend the following for post-nasal drip:  - Claritin (loratidine) 10mg daily   - Flonase 1 drop each nostril daily  - Nasal saline spray each nostril twice daily  Let me know me if symptoms don't improve.      We want you to gain weight. Make sure you eat three solid meals with snacks in between meals or fiver smaller meals everyday. Eat whatever you would like with the exception of high-salt foods. Come back in one month for a weight check. Come back sooner if needed.     I did a referral to GI for new constipation and weight to consider colonoscopy.   Also provide stool sample.      Come back in 4-6 weeks.             MyChart Status: Patient Declined

## 2017-08-14 NOTE — PATIENT INSTRUCTIONS
I recommend the following for post-nasal drip:  - Claritin (loratidine) 10mg daily   - Flonase 1 drop each nostril daily  - Nasal saline spray each nostril twice daily  Let me know me if symptoms don't improve.      We want you to gain weight. Make sure you eat three solid meals with snacks in between meals or fiver smaller meals everyday. Eat whatever you would like with the exception of high-salt foods. Come back in one month for a weight check. Come back sooner if needed.     I did a referral to GI for new constipation and weight to consider colonoscopy.   Also provide stool sample.      Come back in 4-6 weeks.

## 2017-09-11 ENCOUNTER — HOSPITAL ENCOUNTER (OUTPATIENT)
Dept: RADIOLOGY | Facility: MEDICAL CENTER | Age: 82
End: 2017-09-11
Attending: INTERNAL MEDICINE
Payer: MEDICARE

## 2017-09-11 ENCOUNTER — OFFICE VISIT (OUTPATIENT)
Dept: INTERNAL MEDICINE | Facility: MEDICAL CENTER | Age: 82
End: 2017-09-11
Payer: MEDICARE

## 2017-09-11 VITALS
OXYGEN SATURATION: 93 % | DIASTOLIC BLOOD PRESSURE: 79 MMHG | BODY MASS INDEX: 21.52 KG/M2 | TEMPERATURE: 98.3 F | HEIGHT: 61 IN | SYSTOLIC BLOOD PRESSURE: 128 MMHG | HEART RATE: 85 BPM | WEIGHT: 114 LBS

## 2017-09-11 DIAGNOSIS — N18.30 CKD (CHRONIC KIDNEY DISEASE) STAGE 3, GFR 30-59 ML/MIN (HCC): ICD-10-CM

## 2017-09-11 DIAGNOSIS — M85.80 OSTEOPENIA, UNSPECIFIED LOCATION: ICD-10-CM

## 2017-09-11 DIAGNOSIS — M25.512 ACUTE PAIN OF LEFT SHOULDER: ICD-10-CM

## 2017-09-11 PROCEDURE — 73030 X-RAY EXAM OF SHOULDER: CPT | Mod: LT

## 2017-09-11 PROCEDURE — 99214 OFFICE O/P EST MOD 30 MIN: CPT | Mod: GC | Performed by: INTERNAL MEDICINE

## 2017-09-11 ASSESSMENT — PATIENT HEALTH QUESTIONNAIRE - PHQ9: CLINICAL INTERPRETATION OF PHQ2 SCORE: 0

## 2017-09-11 NOTE — PROGRESS NOTES
Established Patient    Lela presents today with the following:    CC: pain of left shoulder     HPI: 85 yr old female patient, Ms Lela Orozco with PMHx of osteopenia on Vitamin D3, CKD and osteoarthritis comes in evaluation of acute left shoulder pain.  Patient has been complaining of pain on the left shoulder radiating to left side and back of the neck for the past 3 weeks, occurs intermittently, 6/10 in severity and pain ranges from dull aching to sharp. Patient is unable to sleep on the left side. Patient has baseline asymmetry of both shoulders and scoliosis. But she its more prominent being bent on right side as per patient. She reports of no injury. Pain is relieved by resting but with activity it becomes worse. Hot running water in the shower, warm compression and tylenol helps the pain to some extent but does not relieve completely. No fever/chils/chest pain/palpitations/nausea/vomiting/abdominal pain/diarrhea/constiptation.      Patient Active Problem List    Diagnosis Date Noted   • Anemia 10/07/2016   • Menopause 10/07/2016   • Osteopenia 10/07/2016   • Weight loss 10/07/2016   • Dyslipidemia (high LDL; low HDL) 10/07/2016   • CKD (chronic kidney disease) stage 3, GFR 30-59 ml/min 10/07/2016   • OA (osteoarthritis)    • Leg cramps        Current Outpatient Prescriptions   Medication Sig Dispense Refill   • Diclofenac Sodium 1 % Gel Apply gel 2-3 times daily as needed. 2 Tube 1   • acetaminophen (TYLENOL) 500 MG Tab Take 2 Tabs by mouth 2 Times a Day. 30 Tab    • Glucosamine-Chondroit-Vit C-Mn (GLUCOSAMINE 1500 COMPLEX PO) Take  by mouth 2 Times a Day.     • aspirin EC (ECOTRIN) 81 MG Tablet Delayed Response Take 81 mg by mouth every day.     • Cholecalciferol (VITAMIN D3) 1000 UNITS Cap Take  by mouth.     • NON SPECIFIED Dwight's Leg cramps 3 tabs SL qday prn       No current facility-administered medications for this visit.        Social History     Social History   • Marital status:       "Spouse name: N/A   • Number of children: N/A   • Years of education: N/A     Occupational History   • Not on file.     Social History Main Topics   • Smoking status: Former Smoker     Packs/day: 0.50     Years: 16.00   • Smokeless tobacco: Never Used      Comment: STOPPED AT AGE 34   • Alcohol use No   • Drug use: No   • Sexual activity: Not on file     Other Topics Concern   • Not on file     Social History Narrative    Lives with husb in [facility].         3 children.      HS grad.    Retired.      ADLs and IADLs intact.        Family History   Problem Relation Age of Onset   • Lung Cancer Brother      X2 HEAVY SMOKERS   • Cancer Father      MOUTH/ PIPE SMOKER       ROS: As per HPI. Additional pertinent symptoms as noted below.    Constitutional: Denies fever/chills.  Eyes: Denies changes/pain in vision  ENT: Denies sore throat/congestion/ear ache.   Cardiovascular: Denies chest pain /Edema/palpitations  Respiratory: Denies SOB/ cough  Abdomen: Denies abdominal pain/difficulty swallowing/ diarrhea/constipation   Genitourinary: Normal urinary habits  Musculo-skeletal: Positive for left shoulder pain and neck pain.  Skin: Denies rash/lesions.  Neurological: Denies weakness/tingling/numbness  Psychological: Normal mood, Cooperative.     /79   Pulse 85   Temp 36.8 °C (98.3 °F)   Ht 1.549 m (5' 0.98\")   Wt 51.7 kg (114 lb)   SpO2 93%   BMI 21.55 kg/m²     Physical Exam  General:  Alert and oriented, No apparent distress. Thin built    Eyes: Pupils equal and reactive. No scleral icterus.    Throat: Clear no erythema or exudates noted.    Neck: Supple. No lymphadenopathy noted. Thyroid not enlarged.    Lungs: Clear to auscultation and percussion bilaterally.    Cardiovascular: Regular rate and rhythm. No murmurs, rubs or gallops.    Abdomen:  Benign. No rebound or guarding noted.    Extremities: No clubbing, cyanosis, edema. Venous stasis.    Skin: Clear. No rash or suspicious skin lesions " noted.    Neurological: Oriented to time, place, and person .Cranial nerves intact. No motor/sensory deficits.Reflexes were normal and symmetrical in both upper and lower extremities     Musculoskeletal :Baseline scoliosis noted. Asymmetry of bilateral shoulders. Left shoulder tenderness present on palpation. ROM of left shoulder joint is restricted because of pain. ROM of all other extremities normal. No verterbral tenderness. Tenderness along trapezius muscle over to the neck noted.      Assessment and Plan    1. Acute pain of left shoulder  - likely musculoskeletal vs tendonitis given her baseline scoliosis  - will order referral to physical therapy , X ray of left shoulder joint   - advised to continue warm compressors and tylenol as needed for symptomatic relief  - ordered diclofenac gel of low concentration to help her pain given her baseline CKD  - will continue to follow up in 2 weeks    2. CKD (chronic kidney disease) stage 3, GFR 30-59 ml/min  - Stable   -will continue to monitor   -advised to avoid nephrotoxic agents  - recommended renally dose medications     3. Osteopenia, unspecified location  -DEXA scan in Oct 2016 showed osteopenia  -patient unable to take bisphos given gfr < 35  - patient not interested in injections for OP  -recommended  And encouraged to take Ca in diet  -patient currently on Vitamin D supplementation   -repeat dexa 2 years - if markedly worse, then, re-address    Follow up in 2 weeks.      Signed by: Ariane Lorenzo M.D.

## 2017-09-26 ENCOUNTER — HOSPITAL ENCOUNTER (OUTPATIENT)
Dept: LAB | Facility: MEDICAL CENTER | Age: 82
End: 2017-09-26
Attending: PHYSICIAN ASSISTANT
Payer: MEDICARE

## 2017-09-26 LAB
ANION GAP SERPL CALC-SCNC: 9 MMOL/L (ref 0–11.9)
BUN SERPL-MCNC: 26 MG/DL (ref 8–22)
CALCIUM SERPL-MCNC: 10 MG/DL (ref 8.5–10.5)
CHLORIDE SERPL-SCNC: 103 MMOL/L (ref 96–112)
CO2 SERPL-SCNC: 25 MMOL/L (ref 20–33)
CREAT SERPL-MCNC: 1.2 MG/DL (ref 0.5–1.4)
GFR SERPL CREATININE-BSD FRML MDRD: 43 ML/MIN/1.73 M 2
GLUCOSE SERPL-MCNC: 89 MG/DL (ref 65–99)
POTASSIUM SERPL-SCNC: 5 MMOL/L (ref 3.6–5.5)
SODIUM SERPL-SCNC: 137 MMOL/L (ref 135–145)
TSH SERPL DL<=0.005 MIU/L-ACNC: 2.29 UIU/ML (ref 0.3–3.7)

## 2017-09-26 PROCEDURE — 36415 COLL VENOUS BLD VENIPUNCTURE: CPT

## 2017-09-26 PROCEDURE — 84443 ASSAY THYROID STIM HORMONE: CPT

## 2017-09-26 PROCEDURE — 80048 BASIC METABOLIC PNL TOTAL CA: CPT

## 2017-09-27 ENCOUNTER — OFFICE VISIT (OUTPATIENT)
Dept: INTERNAL MEDICINE | Facility: MEDICAL CENTER | Age: 82
End: 2017-09-27
Payer: MEDICARE

## 2017-09-27 VITALS
HEIGHT: 61 IN | HEART RATE: 89 BPM | OXYGEN SATURATION: 94 % | DIASTOLIC BLOOD PRESSURE: 88 MMHG | BODY MASS INDEX: 21.55 KG/M2 | WEIGHT: 114.13 LBS | SYSTOLIC BLOOD PRESSURE: 120 MMHG | TEMPERATURE: 98.6 F

## 2017-09-27 DIAGNOSIS — M54.2 NECK AND SHOULDER PAIN: ICD-10-CM

## 2017-09-27 DIAGNOSIS — M25.519 NECK AND SHOULDER PAIN: ICD-10-CM

## 2017-09-27 DIAGNOSIS — Z23 NEED FOR IMMUNIZATION AGAINST INFLUENZA: ICD-10-CM

## 2017-09-27 DIAGNOSIS — R63.4 WEIGHT LOSS: ICD-10-CM

## 2017-09-27 DIAGNOSIS — R05.8 DRY COUGH: ICD-10-CM

## 2017-09-27 DIAGNOSIS — K59.00 CONSTIPATION, UNSPECIFIED CONSTIPATION TYPE: ICD-10-CM

## 2017-09-27 PROCEDURE — 99214 OFFICE O/P EST MOD 30 MIN: CPT | Mod: 25 | Performed by: INTERNAL MEDICINE

## 2017-09-27 PROCEDURE — G0008 ADMIN INFLUENZA VIRUS VAC: HCPCS | Performed by: INTERNAL MEDICINE

## 2017-09-27 PROCEDURE — 90662 IIV NO PRSV INCREASED AG IM: CPT | Performed by: INTERNAL MEDICINE

## 2017-09-27 NOTE — PATIENT INSTRUCTIONS
Do PT.     Eat well.     Do tests ordered by GI.     I recommend the following for post-nasal drip:  - Claritin (loratidine) 10mg daily   - Flonase 1 drop each nostril daily  - Nasal saline spray each nostril twice daily  Let me know me if symptoms don't improve.      Let me know name of powder you are taking.    Come back in 1 month.

## 2017-09-27 NOTE — NON-PROVIDER
"Lela Orozco is a 85 y.o. female here for a non-provider visit for:   FLU    Reason for immunization: Annual Flu Vaccine  Immunization records indicate need for vaccine: Yes, confirmed with Epic  Minimum interval has been met for this vaccine: Yes  ABN completed: Not Indicated    Order and dose verified by: JOSE CHIN Dated  08/07/15 was given to patient: Yes  All IAC Questionnaire questions were answered \"No.\"    Patient tolerated injection and no adverse effects were observed or reported: Yes    Pt scheduled for next dose in series: Not Indicated    "
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
## 2017-10-18 ENCOUNTER — HOSPITAL ENCOUNTER (OUTPATIENT)
Dept: RADIOLOGY | Facility: MEDICAL CENTER | Age: 82
End: 2017-10-18
Attending: PHYSICIAN ASSISTANT
Payer: MEDICARE

## 2017-10-18 DIAGNOSIS — R63.4 LOSS OF WEIGHT: ICD-10-CM

## 2017-10-18 PROCEDURE — 700117 HCHG RX CONTRAST REV CODE 255: Performed by: PHYSICIAN ASSISTANT

## 2017-10-18 PROCEDURE — 71250 CT THORAX DX C-: CPT

## 2017-10-18 PROCEDURE — 74177 CT ABD & PELVIS W/CONTRAST: CPT

## 2017-10-18 RX ADMIN — IOHEXOL 100 ML: 350 INJECTION, SOLUTION INTRAVENOUS at 13:29

## 2017-10-18 RX ADMIN — IOHEXOL 50 ML: 240 INJECTION, SOLUTION INTRATHECAL; INTRAVASCULAR; INTRAVENOUS; ORAL at 13:34

## 2017-10-19 ENCOUNTER — TELEPHONE (OUTPATIENT)
Dept: INTERNAL MEDICINE | Facility: MEDICAL CENTER | Age: 82
End: 2017-10-19

## 2017-10-19 NOTE — TELEPHONE ENCOUNTER
Got notification from GI that pt had CT CAP.  Need to review and talk to pt.   May need serial imaging of pulm nodule

## 2017-10-19 NOTE — LETTER
October 25, 2017         Lela Orozco  [id number] [facility] [id number]        Dear Lela:      We have been trying to get a hold of you to talk about test results from the gastroenterologist's evaluation.  We have been unsuccessful in reaching you by phone, so we are sending this letter.  Upon receipt of this letter, please call the office to talk about the test results.       Sincerely,      Anabella Sanchez M.D.    Electronically Signed

## 2017-10-21 NOTE — TELEPHONE ENCOUNTER
Spokes to rads. Consider repeat CT 3 months.  Call out to pt to discuss test results.  Left VM asking her to call back.

## 2017-10-25 NOTE — TELEPHONE ENCOUNTER
Pt called yesterday(05/24/17) and l/m that she will be home all day tomorrow (05/25/17)    05/25/17: Called and spoke with pt. She said she will be home until 5:20 today.

## 2017-10-26 NOTE — TELEPHONE ENCOUNTER
Spoke with pt.   Will see her in Nov and I can order CT chest for [place]  All questions were answered to the patient's and/or family's satisfaction and patient and/or family was/were appreciative of the phone call.

## 2017-11-02 ENCOUNTER — OFFICE VISIT (OUTPATIENT)
Dept: INTERNAL MEDICINE | Facility: MEDICAL CENTER | Age: 82
End: 2017-11-02
Payer: MEDICARE

## 2017-11-02 VITALS
TEMPERATURE: 97.7 F | HEART RATE: 81 BPM | OXYGEN SATURATION: 94 % | HEIGHT: 61 IN | SYSTOLIC BLOOD PRESSURE: 136 MMHG | WEIGHT: 116.25 LBS | DIASTOLIC BLOOD PRESSURE: 86 MMHG | BODY MASS INDEX: 21.95 KG/M2

## 2017-11-02 DIAGNOSIS — G89.29 CHRONIC NECK PAIN: ICD-10-CM

## 2017-11-02 DIAGNOSIS — N18.30 CKD (CHRONIC KIDNEY DISEASE) STAGE 3, GFR 30-59 ML/MIN (HCC): ICD-10-CM

## 2017-11-02 DIAGNOSIS — R63.4 WEIGHT LOSS: ICD-10-CM

## 2017-11-02 DIAGNOSIS — K59.00 CONSTIPATION, UNSPECIFIED CONSTIPATION TYPE: ICD-10-CM

## 2017-11-02 DIAGNOSIS — R91.1 PULMONARY NODULE: ICD-10-CM

## 2017-11-02 DIAGNOSIS — R31.29 MICROSCOPIC HEMATURIA: ICD-10-CM

## 2017-11-02 DIAGNOSIS — R91.8 OPACITY OF LUNG ON IMAGING STUDY: ICD-10-CM

## 2017-11-02 DIAGNOSIS — D50.9 IRON DEFICIENCY ANEMIA, UNSPECIFIED IRON DEFICIENCY ANEMIA TYPE: ICD-10-CM

## 2017-11-02 DIAGNOSIS — K57.90 DIVERTICULOSIS OF INTESTINE WITHOUT BLEEDING, UNSPECIFIED INTESTINAL TRACT LOCATION: ICD-10-CM

## 2017-11-02 DIAGNOSIS — M54.2 CHRONIC NECK PAIN: ICD-10-CM

## 2017-11-02 PROCEDURE — 99214 OFFICE O/P EST MOD 30 MIN: CPT | Performed by: INTERNAL MEDICINE

## 2017-11-02 NOTE — PATIENT INSTRUCTIONS
Ok to use Voltaren and Tylenol.  Do PT.   If no improvement, let me know and we can consider MRI neck.  Let me know name of stool medication.    Get repeat CT chest after Jan 18, 2018.  Order in -- please schedule.
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
## 2017-12-21 ENCOUNTER — APPOINTMENT (RX ONLY)
Dept: URBAN - METROPOLITAN AREA CLINIC 4 | Facility: CLINIC | Age: 82
Setting detail: DERMATOLOGY
End: 2017-12-21

## 2017-12-21 ENCOUNTER — OFFICE VISIT (OUTPATIENT)
Dept: INTERNAL MEDICINE | Facility: MEDICAL CENTER | Age: 82
End: 2017-12-21
Payer: MEDICARE

## 2017-12-21 VITALS
RESPIRATION RATE: 14 BRPM | HEART RATE: 73 BPM | DIASTOLIC BLOOD PRESSURE: 74 MMHG | WEIGHT: 115.6 LBS | TEMPERATURE: 97.6 F | HEIGHT: 61 IN | OXYGEN SATURATION: 95 % | SYSTOLIC BLOOD PRESSURE: 126 MMHG | BODY MASS INDEX: 21.83 KG/M2

## 2017-12-21 DIAGNOSIS — G89.29 CHRONIC NECK PAIN: ICD-10-CM

## 2017-12-21 DIAGNOSIS — M54.2 CHRONIC NECK PAIN: ICD-10-CM

## 2017-12-21 DIAGNOSIS — L57.0 ACTINIC KERATOSIS: ICD-10-CM

## 2017-12-21 DIAGNOSIS — L70.8 OTHER ACNE: ICD-10-CM

## 2017-12-21 DIAGNOSIS — D18.0 HEMANGIOMA: ICD-10-CM

## 2017-12-21 DIAGNOSIS — R63.4 WEIGHT LOSS: ICD-10-CM

## 2017-12-21 DIAGNOSIS — Z85.820 HISTORY OF MELANOMA: ICD-10-CM

## 2017-12-21 DIAGNOSIS — L82.0 INFLAMED SEBORRHEIC KERATOSIS: ICD-10-CM

## 2017-12-21 DIAGNOSIS — L82.1 OTHER SEBORRHEIC KERATOSIS: ICD-10-CM

## 2017-12-21 DIAGNOSIS — R93.89 ABNORMAL CT OF THE CHEST: ICD-10-CM

## 2017-12-21 DIAGNOSIS — K59.00 CONSTIPATION, UNSPECIFIED CONSTIPATION TYPE: ICD-10-CM

## 2017-12-21 DIAGNOSIS — L81.4 OTHER MELANIN HYPERPIGMENTATION: ICD-10-CM

## 2017-12-21 DIAGNOSIS — D22 MELANOCYTIC NEVI: ICD-10-CM

## 2017-12-21 PROBLEM — D22.62 MELANOCYTIC NEVI OF LEFT UPPER LIMB, INCLUDING SHOULDER: Status: ACTIVE | Noted: 2017-12-21

## 2017-12-21 PROBLEM — D22.72 MELANOCYTIC NEVI OF LEFT LOWER LIMB, INCLUDING HIP: Status: ACTIVE | Noted: 2017-12-21

## 2017-12-21 PROBLEM — D48.5 NEOPLASM OF UNCERTAIN BEHAVIOR OF SKIN: Status: ACTIVE | Noted: 2017-12-21

## 2017-12-21 PROBLEM — D22.71 MELANOCYTIC NEVI OF RIGHT LOWER LIMB, INCLUDING HIP: Status: ACTIVE | Noted: 2017-12-21

## 2017-12-21 PROBLEM — D18.01 HEMANGIOMA OF SKIN AND SUBCUTANEOUS TISSUE: Status: ACTIVE | Noted: 2017-12-21

## 2017-12-21 PROBLEM — D22.5 MELANOCYTIC NEVI OF TRUNK: Status: ACTIVE | Noted: 2017-12-21

## 2017-12-21 PROBLEM — D22.61 MELANOCYTIC NEVI OF RIGHT UPPER LIMB, INCLUDING SHOULDER: Status: ACTIVE | Noted: 2017-12-21

## 2017-12-21 PROCEDURE — ? BIOPSY BY SHAVE METHOD

## 2017-12-21 PROCEDURE — 99213 OFFICE O/P EST LOW 20 MIN: CPT | Mod: 25

## 2017-12-21 PROCEDURE — 17110 DESTRUCTION B9 LES UP TO 14: CPT

## 2017-12-21 PROCEDURE — ? COUNSELING

## 2017-12-21 PROCEDURE — 99214 OFFICE O/P EST MOD 30 MIN: CPT | Performed by: INTERNAL MEDICINE

## 2017-12-21 PROCEDURE — 17000 DESTRUCT PREMALG LESION: CPT | Mod: 59

## 2017-12-21 PROCEDURE — 17003 DESTRUCT PREMALG LES 2-14: CPT

## 2017-12-21 PROCEDURE — 11101: CPT

## 2017-12-21 PROCEDURE — ? SUNSCREEN RECOMMENDATIONS

## 2017-12-21 PROCEDURE — 11100: CPT | Mod: 59

## 2017-12-21 PROCEDURE — ? LIQUID NITROGEN

## 2017-12-21 ASSESSMENT — LOCATION DETAILED DESCRIPTION DERM
LOCATION DETAILED: LEFT CENTRAL MALAR CHEEK
LOCATION DETAILED: LEFT PROXIMAL POSTERIOR UPPER ARM
LOCATION DETAILED: LEFT INFERIOR LATERAL MALAR CHEEK
LOCATION DETAILED: RIGHT FOREHEAD
LOCATION DETAILED: LEFT ULNAR DORSAL HAND
LOCATION DETAILED: RIGHT ANTERIOR PROXIMAL THIGH
LOCATION DETAILED: LEFT INFERIOR ANTERIOR NECK
LOCATION DETAILED: RIGHT SUPERIOR MEDIAL BUCCAL CHEEK
LOCATION DETAILED: PERIUMBILICAL SKIN
LOCATION DETAILED: RIGHT RADIAL DORSAL HAND
LOCATION DETAILED: RIGHT RIB CAGE
LOCATION DETAILED: LEFT ANTERIOR PROXIMAL THIGH
LOCATION DETAILED: LEFT LATERAL FOREHEAD
LOCATION DETAILED: SUPERIOR THORACIC SPINE
LOCATION DETAILED: RIGHT CENTRAL MALAR CHEEK
LOCATION DETAILED: LEFT SUPERIOR FOREHEAD
LOCATION DETAILED: LEFT PROXIMAL DORSAL FOREARM
LOCATION DETAILED: LEFT SUPERIOR MEDIAL UPPER BACK
LOCATION DETAILED: RIGHT INFERIOR LATERAL FOREHEAD
LOCATION DETAILED: RIGHT SUPERIOR MEDIAL MIDBACK
LOCATION DETAILED: RIGHT PROXIMAL DORSAL FOREARM
LOCATION DETAILED: LEFT DISTAL DORSAL FOREARM
LOCATION DETAILED: RIGHT DISTAL POSTERIOR UPPER ARM

## 2017-12-21 ASSESSMENT — LOCATION SIMPLE DESCRIPTION DERM
LOCATION SIMPLE: ABDOMEN
LOCATION SIMPLE: RIGHT CHEEK
LOCATION SIMPLE: LEFT UPPER BACK
LOCATION SIMPLE: LEFT THIGH
LOCATION SIMPLE: LEFT HAND
LOCATION SIMPLE: LEFT POSTERIOR UPPER ARM
LOCATION SIMPLE: RIGHT HAND
LOCATION SIMPLE: RIGHT LOWER BACK
LOCATION SIMPLE: LEFT ANTERIOR NECK
LOCATION SIMPLE: LEFT FOREHEAD
LOCATION SIMPLE: RIGHT THIGH
LOCATION SIMPLE: RIGHT FOREARM
LOCATION SIMPLE: LEFT FOREARM
LOCATION SIMPLE: UPPER BACK
LOCATION SIMPLE: RIGHT FOREHEAD
LOCATION SIMPLE: LEFT CHEEK
LOCATION SIMPLE: RIGHT POSTERIOR UPPER ARM

## 2017-12-21 ASSESSMENT — LOCATION ZONE DERM
LOCATION ZONE: HAND
LOCATION ZONE: ARM
LOCATION ZONE: FACE
LOCATION ZONE: LEG
LOCATION ZONE: NECK
LOCATION ZONE: TRUNK

## 2017-12-21 ASSESSMENT — PAIN SCALES - GENERAL: PAINLEVEL: NO PAIN
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
## 2017-12-21 NOTE — HPI: FULL BODY SKIN EXAMINATION
How Severe Are Your Spot(S)?: mild
What Is The Reason For Today's Visit?: Full Body Skin Examination
What Is The Reason For Today's Visit? (Being Monitored For X): the risk of recurrence of previously treated lesion(s)
Additional History: Rash on back for two weeks. FBE.

## 2017-12-21 NOTE — PROCEDURE: LIQUID NITROGEN
Duration Of Freeze Thaw-Cycle (Seconds): 5
Consent: The patient's consent was obtained including but not limited to risks of crusting, scabbing, blistering, scarring, darker or lighter pigmentary change, recurrence, incomplete removal and infection.
Detail Level: Detailed
Number Of Freeze-Thaw Cycles: 1 freeze-thaw cycle
Render Post-Care Instructions In Note?: no
Post-Care Instructions: I reviewed with the patient in detail post-care instructions. Patient is to wear sunprotection, and avoid picking at any of the treated lesions. Pt may apply Vaseline to crusted or scabbing areas.
Medical Necessity Clause: This procedure was medically necessary because the lesions that were treated were:
Medical Necessity Information: It is in your best interest to select a reason for this procedure from the list below. All of these items fulfill various CMS LCD requirements except the new and changing color options.

## 2017-12-21 NOTE — PROCEDURE: BIOPSY BY SHAVE METHOD
Cryotherapy Text: The wound bed was treated with cryotherapy after the biopsy was performed.
Silver Nitrate Text: The wound bed was treated with silver nitrate after the biopsy was performed.
Biopsy Method: Personna blade
Dressing: bandage
Hemostasis: Drysol
Destruction After The Procedure: No
Electrodesiccation And Curettage Text: The wound bed was treated with electrodesiccation and curettage after the biopsy was performed.
Anesthesia Type: 1% lidocaine with epinephrine
Lab: [id number]
Biopsy Type: H and E
Post-Care Instructions: I reviewed with the patient in detail post-care instructions. Patient is to keep the biopsy site dry overnight, and then apply bacitracin twice daily until healed. Patient may apply hydrogen peroxide soaks to remove any crusting.
Lab Facility: 
Wound Care: Bacitracin
Curettage Text: The wound bed was treated with curettage after the biopsy was performed.
Notification Instructions: Patient will be notified of biopsy results. However, patient instructed to call the office if not contacted within 2 weeks.
X Size Of Lesion In Cm: 0
Detail Level: Detailed
Type Of Destruction Used: Curettage
Billing Type: Third-Party Bill
Electrodesiccation Text: The wound bed was treated with electrodesiccation after the biopsy was performed.
Consent: Written consent was obtained and risks were reviewed including but not limited to scarring, infection, bleeding, scabbing, incomplete removal, nerve damage and allergy to anesthesia.
Anesthesia Volume In Cc: 0.5

## 2017-12-22 ENCOUNTER — APPOINTMENT (OUTPATIENT)
Dept: INTERNAL MEDICINE | Facility: MEDICAL CENTER | Age: 82
End: 2017-12-22
Payer: MEDICARE

## 2017-12-28 ENCOUNTER — APPOINTMENT (RX ONLY)
Dept: URBAN - METROPOLITAN AREA CLINIC 4 | Facility: CLINIC | Age: 82
Setting detail: DERMATOLOGY
End: 2017-12-28

## 2017-12-28 PROBLEM — C44.91 BASAL CELL CARCINOMA OF SKIN, UNSPECIFIED: Status: ACTIVE | Noted: 2017-12-28

## 2017-12-28 PROCEDURE — ? MOHS SURGERY PHONE CONSULTATION

## 2018-01-08 ENCOUNTER — HOSPITAL ENCOUNTER (OUTPATIENT)
Dept: RADIOLOGY | Facility: MEDICAL CENTER | Age: 83
End: 2018-01-08
Attending: INTERNAL MEDICINE
Payer: MEDICARE

## 2018-01-08 DIAGNOSIS — R91.8 OPACITY OF LUNG ON IMAGING STUDY: ICD-10-CM

## 2018-01-08 DIAGNOSIS — R91.1 PULMONARY NODULE: ICD-10-CM

## 2018-01-08 PROCEDURE — 71250 CT THORAX DX C-: CPT

## 2018-01-10 NOTE — PROGRESS NOTES
MAs -   Please call pt.   Let her know that lung nodules (spots) are stable on CT.   Radiology recs a repeat CT in 6 months -- we can order it at a future appt.

## 2018-01-23 ENCOUNTER — APPOINTMENT (RX ONLY)
Dept: URBAN - METROPOLITAN AREA CLINIC 36 | Facility: CLINIC | Age: 83
Setting detail: DERMATOLOGY
End: 2018-01-23

## 2018-01-23 DIAGNOSIS — L57.8 OTHER SKIN CHANGES DUE TO CHRONIC EXPOSURE TO NONIONIZING RADIATION: ICD-10-CM

## 2018-01-23 PROBLEM — C44.41 BASAL CELL CARCINOMA OF SKIN OF SCALP AND NECK: Status: ACTIVE | Noted: 2018-01-23

## 2018-01-23 PROCEDURE — 17312 MOHS ADDL STAGE: CPT

## 2018-01-23 PROCEDURE — ? MOHS SURGERY

## 2018-01-23 PROCEDURE — 17311 MOHS 1 STAGE H/N/HF/G: CPT

## 2018-01-23 PROCEDURE — ? COUNSELING

## 2018-01-23 PROCEDURE — 13121 CMPLX RPR S/A/L 2.6-7.5 CM: CPT

## 2018-01-23 PROCEDURE — 99212 OFFICE O/P EST SF 10 MIN: CPT | Mod: 25

## 2018-01-23 NOTE — HPI: SKIN LESION (BASAL CELL CARCINOMA)
How Severe Is Your Skin Cancer?: moderate
Is This A New Presentation, Or A Follow-Up?: Basal Cell Carcinoma
Location From Outside Provider (Will Override Previously Chosen Location): Location provided was L Central Lateral neck this site is incorrect. Correct location is L inferior post auricular

## 2018-01-23 NOTE — PROCEDURE: MOHS SURGERY
Quadrants Reporting?: 0
Inflammation Suggestive Of Cancer Camouflage Histology Text: There was a dense lymphocytic infiltrate which prevented adequate histologic evaluation of adjacent structures.
Initial Size Of Lesion: 2
Melolabial Interpolation Flap Text: A decision was made to reconstruct the defect utilizing an interpolation axial flap and a staged reconstruction.  A telfa template was made of the defect.  This telfa template was then used to outline the melolabial interpolation flap.  The donor area for the pedicle flap was then injected with anesthesia.  The flap was excised through the skin and subcutaneous tissue down to the layer of the underlying musculature.  The pedicle flap was carefully excised within this deep plane to maintain its blood supply.  The edges of the donor site were undermined.   The donor site was closed in a primary fashion.  The pedicle was then rotated into position and sutured.  Once the tube was sutured into place, adequate blood supply was confirmed with blanching and refill.  The pedicle was then wrapped with xeroform gauze and dressed appropriately with a telfa and gauze bandage to ensure continued blood supply and protect the attached pedicle.
Unique Flap 4 Name: Banner Flap
Previous Accession (Optional): [id number]
Consent (Temporal Branch)/Introductory Paragraph: The rationale for Mohs was explained to the patient and consent was obtained. The risks, benefits and alternatives to therapy were discussed in detail. Specifically, the risks of damage to the temporal branch of the facial nerve, infection, scarring, bleeding, prolonged wound healing, incomplete removal, allergy to anesthesia, and recurrence were addressed. Prior to the procedure, the treatment site was clearly identified and confirmed by the patient. All components of Universal Protocol/PAUSE Rule completed.
Stage 8: Additional Anesthesia Type: 1% lidocaine with epinephrine
Closure 2 Information: This tab is for additional flaps and grafts, including complex repair and grafts and complex repair and flaps. You can also specify a different location for the additional defect, if the location is the same you do not need to select a new one. We will insert the automated text for the repair you select below just as we do for solitary flaps and grafts. Please note that at this time if you select a location with a different insurance zone you will need to override the ICD10 and CPT if appropriate.
Eye Protection Verbiage: Before proceeding with the stage, a plastic scleral shield was inserted. The globe was anesthetized with a few drops of 1% lidocaine with 1:100,000 epinephrine. Then, an appropriate sized scleral shield was chosen and coated with lacrilube ointment. The shield was gently inserted and left in place for the duration of each stage. After the stage was completed, the shield was gently removed.
Area H Indication Text: Tumors in this location are included in Area H (eyelids, eyebrows, nose, lips, chin, ear, pre-auricular, post-auricular, temple, genitalia, hands, feet, ankles and areola).  Tissue conservation is critical in these anatomic locations.
Tarsorrhaphy Text: A tarsorrhaphy was performed using Frost sutures.
Closure 3 Information: This tab is for additional flaps and grafts above and beyond our usual structured repairs.  Please note if you enter information here it will not currently bill and you will need to add the billing information manually.
Mohs Histo Method Verbiage: Each section was then chromacoded and processed in the Mohs lab using the Mohs protocol and submitted for frozen section.
S Plasty Text: Given the location and shape of the defect, and the orientation of relaxed skin tension lines, an S-plasty was deemed most appropriate for repair.  Using a sterile surgical marker, the appropriate outline of the S-plasty was drawn, incorporating the defect and placing the expected incisions within the relaxed skin tension lines where possible.  The area thus outlined was incised deep to adipose tissue with a #15 scalpel blade.  The skin margins were undermined to an appropriate distance in all directions utilizing iris scissors. The skin flaps were advanced over the defect.  The opposing margins were then approximated with interrupted buried subcutaneous sutures.
Ftsg Text: The defect edges were debeveled with a #15 scalpel blade.  Given the location of the defect, shape of the defect and the proximity to free margins a full thickness skin graft was deemed most appropriate.  Using a sterile surgical marker, the primary defect shape was transferred to the donor site. The area thus outlined was incised deep to adipose tissue with a #15 scalpel blade.  The harvested graft was then trimmed of adipose tissue until only dermis and epidermis was left.  The skin margins of the secondary defect were undermined to an appropriate distance in all directions utilizing iris scissors.  The secondary defect was closed with interrupted buried subcutaneous sutures.  The skin edges were then re-apposed with running  sutures.  The skin graft was then placed in the primary defect and oriented appropriately.
Quadrant Reporting?: no
Purse String (Simple) Text: Given the location of the defect and the characteristics of the surrounding skin a purse string closure was deemed most appropriate.  Undermining was performed circumfirentially around the surgical defect.  A purse string suture was then placed and tightened.
Detail Level: Detailed
O-T Plasty Text: The defect edges were debeveled with a #15 scalpel blade.  Given the location of the defect, shape of the defect and the proximity to free margins an O-T plasty was deemed most appropriate.  Using a sterile surgical marker, an appropriate O-T plasty was drawn incorporating the defect and placing the expected incisions within the relaxed skin tension lines where possible.    The area thus outlined was incised deep to adipose tissue with a #15 scalpel blade.  The skin margins were undermined to an appropriate distance in all directions utilizing iris scissors.
Helical Rim Advancement Flap Text: The defect edges were debeveled with a #15 blade scalpel.  Given the location of the defect and the proximity to free margins (helical rim) a double helical rim advancement flap was deemed most appropriate.  Using a sterile surgical marker, the appropriate advancement flaps were drawn incorporating the defect and placing the expected incisions between the helical rim and antihelix where possible.  The area thus outlined was incised through and through with a #15 scalpel blade.  With a skin hook and iris scissors, the flaps were gently and sharply undermined and freed up.
Show Special Stain Variables In The Stage Tabs: Yes
Estimated Blood Loss (Cc): less than 5 cc
Paramedian Forehead Flap Text: A decision was made to reconstruct the defect utilizing an interpolation axial flap and a staged reconstruction.  A telfa template was made of the defect.  This telfa template was then used to outline the paramedian forehead pedicle flap.  The donor area for the pedicle flap was then injected with anesthesia.  The flap was excised through the skin and subcutaneous tissue down to the layer of the underlying musculature.  The pedicle flap was carefully excised within this deep plane to maintain its blood supply.  The edges of the donor site were undermined.   The donor site was closed in a primary fashion.  The pedicle was then rotated into position and sutured.  Once the tube was sutured into place, adequate blood supply was confirmed with blanching and refill.  The pedicle was then wrapped with xeroform gauze and dressed appropriately with a telfa and gauze bandage to ensure continued blood supply and protect the attached pedicle.
Bilobed Transposition Flap Text: The defect edges were debeveled with a #15 scalpel blade.  Given the location of the defect and the proximity to free margins a bilobed transposition flap was deemed most appropriate.  Using a sterile surgical marker, an appropriate bilobe flap drawn around the defect.    The area thus outlined was incised deep to adipose tissue with a #15 scalpel blade.  The skin margins were undermined to an appropriate distance in all directions utilizing iris scissors.
Transposition Flap Text: The defect edges were debeveled with a #15 scalpel blade.  Given the location of the defect and the proximity to free margins a transposition flap was deemed most appropriate.  Using a sterile surgical marker, an appropriate transposition flap was drawn incorporating the defect.    The area thus outlined was incised deep to adipose tissue with a #15 scalpel blade.  The skin margins were undermined to an appropriate distance in all directions utilizing iris scissors.
Cheek-To-Nose Interpolation Flap Text: A decision was made to reconstruct the defect utilizing an interpolation axial flap and a staged reconstruction.  A telfa template was made of the defect.  This telfa template was then used to outline the Cheek-To-Nose Interpolation flap.  The donor area for the pedicle flap was then injected with anesthesia.  The flap was excised through the skin and subcutaneous tissue down to the layer of the underlying musculature.  The interpolation flap was carefully excised within this deep plane to maintain its blood supply.  The edges of the donor site were undermined.   The donor site was closed in a primary fashion.  The pedicle was then rotated into position and sutured.  Once the tube was sutured into place, adequate blood supply was confirmed with blanching and refill.  The pedicle was then wrapped with xeroform gauze and dressed appropriately with a telfa and gauze bandage to ensure continued blood supply and protect the attached pedicle.
Alar Island Pedicle Flap Text: The defect edges were debeveled with a #15 scalpel blade.  Given the location of the defect, shape of the defect and the proximity to the alar rim an island pedicle advancement flap was deemed most appropriate.  Using a sterile surgical marker, an appropriate advancement flap was drawn incorporating the defect, outlining the appropriate donor tissue and placing the expected incisions within the nasal ala running parallel to the alar rim. The area thus outlined was incised with a #15 scalpel blade.  The skin margins were undermined minimally to an appropriate distance in all directions around the primary defect and laterally outward around the island pedicle utilizing iris scissors.  There was minimal undermining beneath the pedicle flap.
Crescentic Complex Repair Preamble Text (Leave Blank If You Do Not Want): Extensive wide undermining was performed.
W Plasty Text: The lesion was extirpated to the level of the fat with a #15 scalpel blade.  Given the location of the defect, shape of the defect and the proximity to free margins a W-plasty was deemed most appropriate for repair.  Using a sterile surgical marker, the appropriate transposition arms of the W-plasty were drawn incorporating the defect and placing the expected incisions within the relaxed skin tension lines where possible.    The area thus outlined was incised deep to adipose tissue with a #15 scalpel blade.  The skin margins were undermined to an appropriate distance in all directions utilizing iris scissors.  The opposing transposition arms were then transposed into place in opposite direction and anchored with interrupted buried subcutaneous sutures.
Referring Physician (Optional): DIALLO Bauer
Referred To Asc For Closure Text (Leave Blank If You Do Not Want): After obtaining clear surgical margins the patient was sent to an ASC for surgical repair.  The patient understands they will receive post-surgical care and follow-up from the ASC physician.
Secondary Intention Text (Leave Blank If You Do Not Want): The defect will heal with secondary intention.
Subsequent Stages Histo Method Verbiage: Using a similar technique to that described above, a thin layer of tissue was removed from all areas where tumor was visible on the previous stage.  The tissue was again oriented, mapped, dyed, and processed as above.
Ear Wedge Repair Text: A wedge excision was completed by carrying down an excision through the full thickness of the ear and cartilage with an inward facing Burow's triangle. The wound was then closed in a layered fashion.
Wound Care: Aquaphor
Post-Care Instructions: I reviewed with the patient in detail post-care instructions. Patient is not to engage in any heavy lifting, exercise, or swimming for the next 14 days. Should the patient develop any fevers, chills, bleeding, severe pain patient will contact the office immediately.
Unique Flap 3 Text: The defect edges were debeveled with a #15 scalpel blade.  Given the location of the defect, shape of the defect and the proximity to free margins a Mercedes (double advancement flap) was deemed most appropriate.  Using a sterile surgical marker, the appropriate transposition flaps were drawn incorporating the defect and placing the expected incisions within the relaxed skin tension lines where possible.    The area thus outlined was incised deep to adipose tissue with a #15 scalpel blade.  The skin margins were undermined to an appropriate distance in all directions utilizing iris scissors.  Hemostasis was achieved with electrocautery.  The flaps were then advanced into the defect and anchored with interrupted buried subcutaneous sutures.
Bcc Histology Text: There were numerous aggregates of basaloid cells.
Consent (Spinal Accessory)/Introductory Paragraph: The rationale for Mohs was explained to the patient and consent was obtained. The risks, benefits and alternatives to therapy were discussed in detail. Specifically, the risks of damage to the spinal accessory nerve, infection, scarring, bleeding, prolonged wound healing, incomplete removal, allergy to anesthesia, and recurrence were addressed. Prior to the procedure, the treatment site was clearly identified and confirmed by the patient. All components of Universal Protocol/PAUSE Rule completed.
Consent (Nose)/Introductory Paragraph: The rationale for Mohs was explained to the patient and consent was obtained. The risks, benefits and alternatives to therapy were discussed in detail. Specifically, the risks of nasal deformity, changes in the flow of air through the nose, infection, scarring, bleeding, prolonged wound healing, incomplete removal, allergy to anesthesia, nerve injury and recurrence were addressed. Prior to the procedure, the treatment site was clearly identified and confirmed by the patient. All components of Universal Protocol/PAUSE Rule completed.
Cartilage Graft Text: The defect edges were debeveled with a #15 scalpel blade.  Given the location of the defect, shape of the defect, the fact the defect involved a full thickness cartilage defect a cartilage graft was deemed most appropriate.  An appropriate donor site was identified, cleansed, and anesthetized. The cartilage graft was then harvested and transferred to the recipient site, oriented appropriately and then sutured into place.  The secondary defect was then repaired using a primary closure.
Epidermal Closure Graft Donor Site (Optional): simple interrupted
Alternatives Discussed Intro (Do Not Add Period): I discussed alternative treatments to Mohs surgery and specifically discussed the risks and benefits of
Wound Care (No Sutures): Petrolatum
Cheiloplasty (Complex) Text: A decision was made to reconstruct the defect with a  cheiloplasty.  The defect was undermined extensively.  Additional obicularis oris muscle was excised with a 15 blade scalpel.  The defect was converted into a full thickness wedge to facilite a better cosmetic result.  Small vessels were then tied off with 5-0 monocyrl. The obicularis oris, superficial fascia, adipose and dermis were then reapproximated.  After the deeper layers were approximated the epidermis was reapproximated with particular care given to realign the vermilion border.
O-T Advancement Flap Text: The defect edges were debeveled with a #15 scalpel blade.  Given the location of the defect, shape of the defect and the proximity to free margins an O-T advancement flap was deemed most appropriate.  Using a sterile surgical marker, an appropriate advancement flap was drawn incorporating the defect and placing the expected incisions within the relaxed skin tension lines where possible.    The area thus outlined was incised deep to adipose tissue with a #15 scalpel blade.  The skin margins were undermined to an appropriate distance in all directions utilizing iris scissors.
Complex Repair And Graft Additional Text (Will Appearing After The Standard Complex Repair Text): The complex repair was not sufficient to completely close the primary defect. The remaining additional defect was repaired with the graft mentioned below.
Unique Flap 2 Name: Staged Pedicled Interpolation Flap Division
Manual Repair Warning Statement: We plan on removing the manually selected variable below in favor of our much easier automatic structured text blocks found in the previous tab. We decided to do this to help make the flow better and give you the full power of structured data. Manual selection is never going to be ideal in our platform and I would encourage you to avoid using manual selection from this point on, especially since I will be sunsetting this feature. It is important that you do one of two things with the customized text below. First, you can save all of the text in a word file so you can have it for future reference. Second, transfer the text to the appropriate area in the Library tab. Lastly, if there is a flap or graft type which we do not have you need to let us know right away so I can add it in before the variable is hidden. No need to panic, we plan to give you roughly 6 months to make the change.
Consent 2/Introductory Paragraph: Mohs surgery was explained to the patient and consent was obtained. The risks, benefits and alternatives to therapy were discussed in detail. Specifically, the risks of infection, scarring, bleeding, prolonged wound healing, incomplete removal, allergy to anesthesia, nerve injury and recurrence were addressed. Prior to the procedure, the treatment site was clearly identified and confirmed by the patient. All components of Universal Protocol/PAUSE Rule completed.
Rotation Flap Text: The defect edges were debeveled with a #15 scalpel blade.  Given the location of the defect, shape of the defect and the proximity to free margins a rotation flap was deemed most appropriate.  Using a sterile surgical marker, an appropriate rotation flap was drawn incorporating the defect and placing the expected incisions within the relaxed skin tension lines where possible.    The area thus outlined was incised deep to adipose tissue with a #15 scalpel blade.  The skin margins were undermined to an appropriate distance in all directions utilizing iris scissors.
Anesthesia Volume In Cc: 6
Surgical Defect Length In Cm (Optional): 2.6
Unna Boot Text: An Unna boot was placed to help immobilize the limb and facilitate more rapid healing.
Ear Star Wedge Flap Text: The defect edges were debeveled with a #15 blade scalpel.  Given the location of the defect and the proximity to free margins (helical rim) an ear star wedge flap was deemed most appropriate.  Using a sterile surgical marker, the appropriate flap was drawn incorporating the defect and placing the expected incisions between the helical rim and antihelix where possible.  The area thus outlined was incised through and through with a #15 scalpel blade.
Number Of Stages: 3
Mohs Method Verbiage: An incision at a 45 degree angle following the standard Mohs approach was done and the specimen was harvested as a microscopic controlled layer.
Bi-Rhombic Flap Text: The defect edges were debeveled with a #15 scalpel blade.  Given the location of the defect and the proximity to free margins a bi-rhombic flap was deemed most appropriate.  Using a sterile surgical marker, an appropriate rhombic flap was drawn incorporating the defect. The area thus outlined was incised deep to adipose tissue with a #15 scalpel blade.  The skin margins were undermined to an appropriate distance in all directions utilizing iris scissors.
Stage 2: Number Of Blocks?: 1
Location Indication Override (Is Already Calculated Based On Selected Body Location): Area M
V-Y Flap Text: The defect edges were debeveled with a #15 scalpel blade.  Given the location of the defect, shape of the defect and the proximity to free margins a V-Y flap was deemed most appropriate.  Using a sterile surgical marker, an appropriate advancement flap was drawn incorporating the defect and placing the expected incisions within the relaxed skin tension lines where possible.    The area thus outlined was incised deep to adipose tissue with a #15 scalpel blade.  The skin margins were undermined to an appropriate distance in all directions utilizing iris scissors.
Island Pedicle Flap-Requiring Vessel Identification Text: The defect edges were debeveled with a #15 scalpel blade.  Given the location of the defect, shape of the defect and the proximity to free margins an island pedicle advancement flap was deemed most appropriate.  Using a sterile surgical marker, an appropriate advancement flap was drawn, based on the axial vessel mentioned above, incorporating the defect, outlining the appropriate donor tissue and placing the expected incisions within the relaxed skin tension lines where possible.    The area thus outlined was incised deep to adipose tissue with a #15 scalpel blade.  The skin margins were undermined to an appropriate distance in all directions around the primary defect and laterally outward around the island pedicle utilizing iris scissors.  There was minimal undermining beneath the pedicle flap.
No Residual Tumor Seen Histology Text: There were no malignant cells seen in the sections examined.
Epidermal Autograft Text: The defect edges were debeveled with a #15 scalpel blade.  Given the location of the defect, shape of the defect and the proximity to free margins an epidermal autograft was deemed most appropriate.  Using a sterile surgical marker, the primary defect shape was transferred to the donor site. The epidermal graft was then harvested.  The skin graft was then placed in the primary defect and oriented appropriately.
Star Wedge Flap Text: The defect edges were debeveled with a #15 scalpel blade.  Given the location of the defect, shape of the defect and the proximity to free margins a star wedge flap was deemed most appropriate.  Using a sterile surgical marker, an appropriate rotation flap was drawn incorporating the defect and placing the expected incisions within the relaxed skin tension lines where possible. The area thus outlined was incised deep to adipose tissue with a #15 scalpel blade.  The skin margins were undermined to an appropriate distance in all directions utilizing iris scissors.
Repair Anesthesia Type: 1% lidocaine with 1:100,000 epinephrine and 408mcg clindamycin/ml and a 1:10 solution of 8.4% sodium bicarbonate
Posterior Auricular Interpolation Flap Text: A decision was made to reconstruct the defect utilizing an interpolation axial flap and a staged reconstruction.  A telfa template was made of the defect.  This telfa template was then used to outline the posterior auricular interpolation flap.  The donor area for the pedicle flap was then injected with anesthesia.  The flap was excised through the skin and subcutaneous tissue down to the layer of the underlying musculature.  The pedicle flap was carefully excised within this deep plane to maintain its blood supply.  The edges of the donor site were undermined.   The donor site was closed in a primary fashion.  The pedicle was then rotated into position and sutured.  Once the tube was sutured into place, adequate blood supply was confirmed with blanching and refill.  The pedicle was then wrapped with xeroform gauze and dressed appropriately with a telfa and gauze bandage to ensure continued blood supply and protect the attached pedicle.
Deep Sutures: 4-0 Vicryl
Dressing: dry sterile dressing
Tissue Cultured Epidermal Autograft Text: The defect edges were debeveled with a #15 scalpel blade.  Given the location of the defect, shape of the defect and the proximity to free margins a tissue cultured epidermal autograft was deemed most appropriate.  The graft was then trimmed to fit the size of the defect.  The graft was then placed in the primary defect and oriented appropriately.
Trilobed Flap Text: The defect edges were debeveled with a #15 scalpel blade.  Given the location of the defect and the proximity to free margins a trilobed flap was deemed most appropriate.  Using a sterile surgical marker, an appropriate trilobed flap drawn around the defect.    The area thus outlined was incised deep to adipose tissue with a #15 scalpel blade.  The skin margins were undermined to an appropriate distance in all directions utilizing iris scissors.
V-Y Plasty Text: The defect edges were debeveled with a #15 scalpel blade.  Given the location of the defect, shape of the defect and the proximity to free margins an V-Y advancement flap was deemed most appropriate.  Using a sterile surgical marker, an appropriate advancement flap was drawn incorporating the defect and placing the expected incisions within the relaxed skin tension lines where possible.    The area thus outlined was incised deep to adipose tissue with a #15 scalpel blade.  The skin margins were undermined to an appropriate distance in all directions utilizing iris scissors.
Referred To Otolaryngology For Closure Text (Leave Blank If You Do Not Want): After obtaining clear surgical margins the patient was sent to otolaryngology for surgical repair.  The patient understands they will receive post-surgical care and follow-up from the referring physician's office.
Island Pedicle Flap Text: The defect edges were debeveled with a #15 scalpel blade.  Given the location of the defect, shape of the defect and the proximity to free margins an island pedicle advancement flap was deemed most appropriate.  Using a sterile surgical marker, an appropriate advancement flap was drawn incorporating the defect, outlining the appropriate donor tissue and placing the expected incisions within the relaxed skin tension lines where possible.    The area thus outlined was incised deep to adipose tissue with a #15 scalpel blade.  The skin margins were undermined to an appropriate distance in all directions around the primary defect and laterally outward around the island pedicle utilizing iris scissors.  There was minimal undermining beneath the pedicle flap.
Cheek Interpolation Flap Text: A decision was made to reconstruct the defect utilizing an interpolation axial flap and a staged reconstruction.  A telfa template was made of the defect.  This telfa template was then used to outline the Cheek Interpolation flap.  The donor area for the pedicle flap was then injected with anesthesia.  The flap was excised through the skin and subcutaneous tissue down to the layer of the underlying musculature.  The interpolation flap was carefully excised within this deep plane to maintain its blood supply.  The edges of the donor site were undermined.   The donor site was closed in a primary fashion.  The pedicle was then rotated into position and sutured.  Once the tube was sutured into place, adequate blood supply was confirmed with blanching and refill.  The pedicle was then wrapped with xeroform gauze and dressed appropriately with a telfa and gauze bandage to ensure continued blood supply and protect the attached pedicle.
Modified Advancement Flap Text: The defect edges were debeveled with a #15 scalpel blade.  Given the location of the defect, shape of the defect and the proximity to free margins a modified advancement flap was deemed most appropriate.  Using a sterile surgical marker, an appropriate advancement flap was drawn incorporating the defect and placing the expected incisions within the relaxed skin tension lines where possible.    The area thus outlined was incised deep to adipose tissue with a #15 scalpel blade.  The skin margins were undermined to an appropriate distance in all directions utilizing iris scissors.
Lazy S Intermediate Repair Preamble Text (Leave Blank If You Do Not Want): Undermining was performed with blunt dissection.
Melolabial Transposition Flap Text: The defect edges were debeveled with a #15 scalpel blade.  Given the location of the defect and the proximity to free margins a melolabial flap was deemed most appropriate.  Using a sterile surgical marker, an appropriate melolabial transposition flap was drawn incorporating the defect.    The area thus outlined was incised deep to adipose tissue with a #15 scalpel blade.  The skin margins were undermined to an appropriate distance in all directions utilizing iris scissors.
Area M Indication Text: Tumors in this location are included in Area M (cheek, forehead, scalp, neck, jawline and pretibial skin).  Mohs surgery is indicated for tumors in these anatomic locations.
Consent (Scalp)/Introductory Paragraph: The rationale for Mohs was explained to the patient and consent was obtained. The risks, benefits and alternatives to therapy were discussed in detail. Specifically, the risks of changes in hair growth pattern secondary to repair, infection, scarring, bleeding, prolonged wound healing, incomplete removal, allergy to anesthesia, nerve injury and recurrence were addressed. Prior to the procedure, the treatment site was clearly identified and confirmed by the patient. All components of Universal Protocol/PAUSE Rule completed.
Advancement Flap (Single) Text: The defect edges were debeveled with a #15 scalpel blade.  Given the location of the defect and the proximity to free margins a single advancement flap was deemed most appropriate.  Using a sterile surgical marker, an appropriate advancement flap was drawn incorporating the defect and placing the expected incisions within the relaxed skin tension lines where possible.    The area thus outlined was incised deep to adipose tissue with a #15 scalpel blade.  The skin margins were undermined to an appropriate distance in all directions utilizing iris scissors.
Donor Site Anesthesia Type: same as repair anesthesia
Burow's Advancement Flap Text: The defect edges were debeveled with a #15 scalpel blade.  Given the location of the defect and the proximity to free margins a Burow's advancement flap was deemed most appropriate.  Using a sterile surgical marker, the appropriate advancement flap was drawn incorporating the defect and placing the expected incisions within the relaxed skin tension lines where possible.    The area thus outlined was incised deep to adipose tissue with a #15 scalpel blade.  The skin margins were undermined to an appropriate distance in all directions utilizing iris scissors.
Consent Type: Consent 1 (Standard)
Consent (Near Eyelid Margin)/Introductory Paragraph: The rationale for Mohs was explained to the patient and consent was obtained. The risks, benefits and alternatives to therapy were discussed in detail. Specifically, the risks of ectropion or eyelid deformity, infection, scarring, bleeding, prolonged wound healing, incomplete removal, allergy to anesthesia, nerve injury and recurrence were addressed. Prior to the procedure, the treatment site was clearly identified and confirmed by the patient. All components of Universal Protocol/PAUSE Rule completed.
Partial Purse String (Intermediate) Text: Given the location of the defect and the characteristics of the surrounding skin an intermediate purse string closure was deemed most appropriate.  Undermining was performed circumfirentially around the surgical defect.  A purse string suture was then placed and tightened. Wound tension only allowed a partial closure of the circular defect.
Graft Donor Site Epidermal Sutures (Optional): 5-0 Ethibond
Referred To Plastics For Closure Text (Leave Blank If You Do Not Want): After obtaining clear surgical margins the patient was sent to plastics for surgical repair.  The patient understands they will receive post-surgical care and follow-up from the referring physician's office.
Referred To Mid-Level For Closure Text (Leave Blank If You Do Not Want): After obtaining clear surgical margins the patient was sent to a mid-level provider for surgical repair.  The patient understands they will receive post-surgical care and follow-up from the mid-level provider.
Xenograft Text: The defect edges were debeveled with a #15 scalpel blade.  Given the location of the defect, shape of the defect and the proximity to free margins a xenograft was deemed most appropriate.  The graft was then trimmed to fit the size of the defect.  The graft was then placed in the primary defect and oriented appropriately.
Island Pedicle Flap With Canthal Suspension Text: The defect edges were debeveled with a #15 scalpel blade.  Given the location of the defect, shape of the defect and the proximity to free margins an island pedicle advancement flap was deemed most appropriate.  Using a sterile surgical marker, an appropriate advancement flap was drawn incorporating the defect, outlining the appropriate donor tissue and placing the expected incisions within the relaxed skin tension lines where possible. The area thus outlined was incised deep to adipose tissue with a #15 scalpel blade.  The skin margins were undermined to an appropriate distance in all directions around the primary defect and laterally outward around the island pedicle utilizing iris scissors.  There was minimal undermining beneath the pedicle flap. A suspension suture was placed in the canthal tendon to prevent tension and prevent ectropion.
Postop Diagnosis: same
Epidermal Sutures: 4-0 Ethilon
Split-Thickness Skin Graft Text: The defect edges were debeveled with a #15 scalpel blade.  Given the location of the defect, shape of the defect and the proximity to free margins a split thickness skin graft was deemed most appropriate.  Using a sterile surgical marker, the primary defect shape was transferred to the donor site. The split thickness graft was then harvested.  The skin graft was then placed in the primary defect and oriented appropriately.
Interpolation Flap Text: A decision was made to reconstruct the defect utilizing an interpolation axial flap and a staged reconstruction.  A telfa template was made of the defect.  This telfa template was then used to outline the interpolation flap.  The donor area for the pedicle flap was then injected with anesthesia.  The flap was excised through the skin and subcutaneous tissue down to the layer of the underlying musculature.  The interpolation flap was carefully excised within this deep plane to maintain its blood supply.  The edges of the donor site were undermined.   The donor site was closed in a primary fashion.  The pedicle was then rotated into position and sutured.  Once the tube was sutured into place, adequate blood supply was confirmed with blanching and refill.  The pedicle was then wrapped with xeroform gauze and dressed appropriately with a telfa and gauze bandage to ensure continued blood supply and protect the attached pedicle.
Repair Anesthesia Method: local infiltration
Unique Flap 1 Text: A decision was made to reconstruct the defect utilizing a myocutaneous Island pedicle Flap based on the levator labii superioris muscle.  A telfa template was made of the defect.  This telfa template was then used to outline the myocutaneous flap, based along the meilolabial fold.  The donor area for the pedicle flap was then injected with anesthesia.  The flap was excised through the skin and subcutaneous tissue down to the layer of the underlying musculature.  The myocutaneous flap was carefully excised within this deep plane to maintain its blood supply. Based on the muscle. The edges of the donor site were undermined.   The donor site was closed in a primary fashion to the point of transposition.  The pedicle was then transposed into position and sutured.  Once the flap was sutured into place, adequate blood supply was confirmed with blanching and refill.
Cheiloplasty (Less Than 50%) Text: A decision was made to reconstruct the defect with a  cheiloplasty.  The defect was undermined extensively.  Additional obicularis oris muscle was excised with a 15 blade scalpel.  The defect was converted into a full thickness wedge, of less than 50% of the vertical height of the lip, to facilite a better cosmetic result.  Small vessels were then tied off with 5-0 monocyrl. The obicularis oris, superficial fascia, adipose and dermis were then reapproximated.  After the deeper layers were approximated the epidermis was reapproximated with particular care given to realign the vermilion border.
Full Thickness Lip Wedge Repair (Flap) Text: Given the location of the defect and the proximity to free margins a full thickness wedge repair was deemed most appropriate.  Using a sterile surgical marker, the appropriate repair was drawn incorporating the defect and placing the expected incisions perpendicular to the vermilion border.  The vermilion border was also meticulously outlined to ensure appropriate reapproximation during the repair.  The area thus outlined was incised through and through with a #15 scalpel blade.  The muscularis and dermis were reaproximated with deep sutures following hemostasis. Care was taken to realign the vermilion border before proceeding with the superficial closure.  Once the vermilion was realigned the superfical and mucosal closure was finished.
Medical Necessity Statement: Based on my medical judgement, Mohs surgery is the most appropriate treatment for this cancer compared to other treatments.
Muscle Hinge Flap Text: The defect edges were debeveled with a #15 scalpel blade.  Given the size, depth and location of the defect and the proximity to free margins a muscle hinge flap was deemed most appropriate.  Using a sterile surgical marker, an appropriate hinge flap was drawn incorporating the defect. The area thus outlined was incised with a #15 scalpel blade.  The skin margins were undermined to an appropriate distance in all directions utilizing iris scissors.
Unique Flap 2 Text: A decision was made to reconstruct the defect utilizing a Pedicled Interpolation flap and a staged reconstruction.  The first stage was completed 2-4 weeks ago. Patient here today for Flap takedown and inset. A tourniquet was applied to the tubed Pedicle and allowed to sit for 10 minutes to ensure capillary refill was adequate. Flap was divided after appropriate monitoring and both sides of Flap were thinned and inset with simple 5.0 sutures.
Consent (Lip)/Introductory Paragraph: The rationale for Mohs was explained to the patient and consent was obtained. The risks, benefits and alternatives to therapy were discussed in detail. Specifically, the risks of lip deformity, changes in the oral aperture, infection, scarring, bleeding, prolonged wound healing, incomplete removal, allergy to anesthesia, nerve injury and recurrence were addressed. Prior to the procedure, the treatment site was clearly identified and confirmed by the patient. All components of Universal Protocol/PAUSE Rule completed.
Purse String (Intermediate) Text: Given the location of the defect and the characteristics of the surrounding skin a purse string intermediate closure was deemed most appropriate.  Undermining was performed circumfirentially around the surgical defect.  A purse string suture was then placed and tightened.
A-T Advancement Flap Text: The defect edges were debeveled with a #15 scalpel blade.  Given the location of the defect, shape of the defect and the proximity to free margins an A-T advancement flap was deemed most appropriate.  Using a sterile surgical marker, an appropriate advancement flap was drawn incorporating the defect and placing the expected incisions within the relaxed skin tension lines where possible.    The area thus outlined was incised deep to adipose tissue with a #15 scalpel blade.  The skin margins were undermined to an appropriate distance in all directions utilizing iris scissors.
O-L Flap Text: The defect edges were debeveled with a #15 scalpel blade.  Given the location of the defect, shape of the defect and the proximity to free margins an O-L flap was deemed most appropriate.  Using a sterile surgical marker, an appropriate advancement flap was drawn incorporating the defect and placing the expected incisions within the relaxed skin tension lines where possible.    The area thus outlined was incised deep to adipose tissue with a #15 scalpel blade.  The skin margins were undermined to an appropriate distance in all directions utilizing iris scissors.
Suture Removal: 7 days
Consent 1/Introductory Paragraph: The rationale for Mohs was explained to the patient and consent was obtained. The risks, benefits and alternatives to therapy were discussed in detail. Specifically, the risks of infection, scarring, bleeding, prolonged wound healing, incomplete removal, allergy to anesthesia, nerve injury and recurrence were addressed. Prior to the procedure, the treatment site was clearly identified and confirmed by the patient. All components of Universal Protocol/PAUSE Rule completed.
Skin Substitute Text: The defect edges were debeveled with a #15 scalpel blade.  Given the location of the defect, shape of the defect and the proximity to free margins a skin substitute graft was deemed most appropriate.  The graft material was trimmed to fit the size of the defect. The graft was then placed in the primary defect and oriented appropriately.
Complex Repair And Flap Additional Text (Will Appearing After The Standard Complex Repair Text): The complex repair was not sufficient to completely close the primary defect. The remaining additional defect was repaired with the flap mentioned below.
Consent (Marginal Mandibular)/Introductory Paragraph: The rationale for Mohs was explained to the patient and consent was obtained. The risks, benefits and alternatives to therapy were discussed in detail. Specifically, the risks of damage to the marginal mandibular branch of the facial nerve, infection, scarring, bleeding, prolonged wound healing, incomplete removal, allergy to anesthesia, and recurrence were addressed. Prior to the procedure, the treatment site was clearly identified and confirmed by the patient. All components of Universal Protocol/PAUSE Rule completed.
Bilobed Flap Text: The defect edges were debeveled with a #15 scalpel blade.  Given the location of the defect and the proximity to free margins a bilobe flap was deemed most appropriate.  Using a sterile surgical marker, an appropriate bilobe flap drawn around the defect.    The area thus outlined was incised deep to adipose tissue with a #15 scalpel blade.  The skin margins were undermined to an appropriate distance in all directions utilizing iris scissors.
Unique Flap 3 Name: Mercedes Flap
Bcc Infiltrative Histology Text: There were numerous aggregates of basaloid cells demonstrating an infiltrative pattern.
Same Histology In Subsequent Stages Text: The pattern and morphology of the tumor is as described in the first stage.
Anesthesia Type: 1% lidocaine with 1:100,000 epinephrine and a 1:10 solution of 8.4% sodium bicarbonate
O-Z Plasty Text: The defect edges were debeveled with a #15 scalpel blade.  Given the location of the defect, shape of the defect and the proximity to free margins an O-Z plasty (double transposition flap) was deemed most appropriate.  Using a sterile surgical marker, the appropriate transposition flaps were drawn incorporating the defect and placing the expected incisions within the relaxed skin tension lines where possible.    The area thus outlined was incised deep to adipose tissue with a #15 scalpel blade.  The skin margins were undermined to an appropriate distance in all directions utilizing iris scissors.  Hemostasis was achieved with electrocautery.  The flaps were then transposed into place, one clockwise and the other counterclockwise, and anchored with interrupted buried subcutaneous sutures.
Hatchet Flap Text: The defect edges were debeveled with a #15 scalpel blade.  Given the location of the defect, shape of the defect and the proximity to free margins a hatchet flap based from the glabella was deemed most appropriate.  Using a sterile surgical marker, an appropriate glabellar hatchet flap was drawn incorporating the defect and placing the expected incisions within the relaxed skin tension lines where possible.    The area thus outlined was incised deep to adipose tissue with a #15 scalpel blade.  The skin margins were undermined to an appropriate distance in all directions utilizing iris scissors.
Mohs Rapid Report Verbiage: The area of clinically evident tumor was marked with skin marking ink and appropriately hatched.  The initial incision was made following the Mohs approach through the skin.  The specimen was taken to the lab, divided into the necessary number of pieces, chromacoded and processed according to the Mohs protocol.  This was repeated in successive stages until a tumor free defect was achieved.
No Repair - Repaired With Adjacent Surgical Defect Text (Leave Blank If You Do Not Want): After obtaining clear surgical margins the defect was repaired concurrently with another surgical defect which was in close approximation.
Z Plasty Text: The lesion was extirpated to the level of the fat with a #15 scalpel blade.  Given the location of the defect, shape of the defect and the proximity to free margins a Z-plasty was deemed most appropriate for repair.  Using a sterile surgical marker, the appropriate transposition arms of the Z-plasty were drawn incorporating the defect and placing the expected incisions within the relaxed skin tension lines where possible.    The area thus outlined was incised deep to adipose tissue with a #15 scalpel blade.  The skin margins were undermined to an appropriate distance in all directions utilizing iris scissors.  The opposing transposition arms were then transposed into place in opposite direction and anchored with interrupted buried subcutaneous sutures.
Double Island Pedicle Flap Text: The defect edges were debeveled with a #15 scalpel blade.  Given the location of the defect, shape of the defect and the proximity to free margins a double island pedicle advancement flap was deemed most appropriate.  Using a sterile surgical marker, an appropriate advancement flap was drawn incorporating the defect, outlining the appropriate donor tissue and placing the expected incisions within the relaxed skin tension lines where possible.    The area thus outlined was incised deep to adipose tissue with a #15 scalpel blade.  The skin margins were undermined to an appropriate distance in all directions around the primary defect and laterally outward around the island pedicle utilizing iris scissors.  There was minimal undermining beneath the pedicle flap.
Repair Performed By Another Provider Text (Leave Blank If You Do Not Want): After obtaining clear surgical margins the defect was repaired by another provider.
Bilateral Helical Rim Advancement Flap Text: The defect edges were debeveled with a #15 blade scalpel.  Given the location of the defect and the proximity to free margins (helical rim) a bilateral helical rim advancement flap was deemed most appropriate.  Using a sterile surgical marker, the appropriate advancement flaps were drawn incorporating the defect and placing the expected incisions between the helical rim and antihelix where possible.  The area thus outlined was incised through and through with a #15 scalpel blade.  With a skin hook and iris scissors, the flaps were gently and sharply undermined and freed up.
Referred To Oculoplastics For Closure Text (Leave Blank If You Do Not Want): After obtaining clear surgical margins the patient was sent to oculoplastics for surgical repair.  The patient understands they will receive post-surgical care and follow-up from the referring physician's office.
Consent (Ear)/Introductory Paragraph: The rationale for Mohs was explained to the patient and consent was obtained. The risks, benefits and alternatives to therapy were discussed in detail. Specifically, the risks of ear deformity, infection, scarring, bleeding, prolonged wound healing, incomplete removal, allergy to anesthesia, nerve injury and recurrence were addressed. Prior to the procedure, the treatment site was clearly identified and confirmed by the patient. All components of Universal Protocol/PAUSE Rule completed.
Dorsal Nasal Flap Text: The defect edges were debeveled with a #15 scalpel blade.  Given the location of the defect and the proximity to free margins a dorsal nasal flap,based upon the glabellar folds, was deemed most appropriate.  Using a sterile surgical marker, an appropriate dorsal nasal flap was drawn around the defect.    The area thus outlined was incised deep to adipose tissue with a #15 scalpel blade.  The skin margins were undermined to an appropriate distance in all directions utilizing iris scissors.
Home Suture Removal Text: Patient was provided instructions on removing sutures and will remove their sutures at home.  If they have any questions or difficulties they will call the office.
Crescentic Advancement Flap Text: The defect edges were debeveled with a #15 scalpel blade.  Given the location of the defect and the proximity to free margins a crescentic advancement flap was deemed most appropriate.  Using a sterile surgical marker, the appropriate advancement flap was drawn incorporating the defect and placing the expected incisions within the relaxed skin tension lines where possible.    The area thus outlined was incised deep to adipose tissue with a #15 scalpel blade.  The skin margins were undermined to an appropriate distance in all directions utilizing iris scissors.
H Plasty Text: Given the location of the defect, shape of the defect and the proximity to free margins a H-plasty was deemed most appropriate for repair.  Using a sterile surgical marker, the appropriate advancement arms of the H-plasty were drawn incorporating the defect and placing the expected incisions within the relaxed skin tension lines where possible. The area thus outlined was incised deep to adipose tissue with a #15 scalpel blade. The skin margins were undermined to an appropriate distance in all directions utilizing iris scissors.  The opposing advancement arms were then advanced into place in opposite direction and anchored with interrupted buried subcutaneous sutures.
Unique Flap 4 Text: The defect edges were debeveled with a #15 scalpel blade.  Given the location of the defect and the proximity to free margins a Banner transposition flap was deemed most appropriate.  Using a sterile surgical marker, an appropriate Banner transposition flap was drawn incorporating the defect.    The area thus outlined was incised deep to adipose tissue with a #15 scalpel blade.  The skin margins were undermined to an appropriate distance in all directions utilizing iris scissors.
Mohs Case Number: [id number]
Surgeon Performing Repair (Optional): Kathy
Area L Indication Text: Tumors in this location are included in Area L (trunk and extremities).  Mohs surgery is indicated for larger tumors, or tumors with aggressive histologic features, in these anatomic locations.
Graft Basting Suture (Optional): 5-0 Fast Absorbing Gut
Hemostasis: Electrocautery
Repair Type: Complex Repair
Dermal Autograft Text: The defect edges were debeveled with a #15 scalpel blade.  Given the location of the defect, shape of the defect and the proximity to free margins a dermal autograft was deemed most appropriate.  Using a sterile surgical marker, the primary defect shape was transferred to the donor site. The area thus outlined was incised deep to adipose tissue with a #15 scalpel blade.  The harvested graft was then trimmed of adipose and epidermal tissue until only dermis was left.  The skin graft was then placed in the primary defect and oriented appropriately.
Mastoid Interpolation Flap Text: A decision was made to reconstruct the defect utilizing an interpolation axial flap and a staged reconstruction.  A telfa template was made of the defect.  This telfa template was then used to outline the mastoid interpolation flap.  The donor area for the pedicle flap was then injected with anesthesia.  The flap was excised through the skin and subcutaneous tissue down to the layer of the underlying musculature.  The pedicle flap was carefully excised within this deep plane to maintain its blood supply.  The edges of the donor site were undermined.   The donor site was closed in a primary fashion.  The pedicle was then rotated into position and sutured.  Once the tube was sutured into place, adequate blood supply was confirmed with blanching and refill.  The pedicle was then wrapped with xeroform gauze and dressed appropriately with a telfa and gauze bandage to ensure continued blood supply and protect the attached pedicle.
Epidermal Closure: running cuticular
Localized Dermabrasion With Wire Brush Text: The patient was draped in routine manner.  Localized dermabrasion using 3 x 17 mm wire brush was performed in routine manner to papillary dermis. This spot dermabrasion is being performed to complete skin cancer reconstruction. It also will eliminate the other sun damaged precancerous cells that are known to be part of the regional effect of a lifetime's worth of sun exposure. This localized dermabrasion is therapeutic and should not be considered cosmetic in any regard.
Spiral Flap Text: The defect edges were debeveled with a #15 scalpel blade.  Given the location of the defect, shape of the defect and the proximity to free margins a spiral flap was deemed most appropriate.  Using a sterile surgical marker, an appropriate rotation flap was drawn incorporating the defect and placing the expected incisions within the relaxed skin tension lines where possible. The area thus outlined was incised deep to adipose tissue with a #15 scalpel blade.  The skin margins were undermined to an appropriate distance in all directions utilizing iris scissors.
Consent 3/Introductory Paragraph: I gave the patient a chance to ask questions they had about the procedure.  Following this I explained the Mohs procedure and consent was obtained. The risks, benefits and alternatives to therapy were discussed in detail. Specifically, the risks of infection, scarring, bleeding, prolonged wound healing, incomplete removal, allergy to anesthesia, nerve injury and recurrence were addressed. Prior to the procedure, the treatment site was clearly identified and confirmed by the patient. All components of Universal Protocol/PAUSE Rule completed.
Advancement Flap (Double) Text: The defect edges were debeveled with a #15 scalpel blade.  Given the location of the defect and the proximity to free margins a double advancement flap was deemed most appropriate.  Using a sterile surgical marker, the appropriate advancement flaps were drawn incorporating the defect and placing the expected incisions within the relaxed skin tension lines where possible.    The area thus outlined was incised deep to adipose tissue with a #15 scalpel blade.  The skin margins were undermined to an appropriate distance in all directions utilizing iris scissors.
Unique Flap 1 Name: Myocutaneous Island pedicle Flap
Keystone Flap Text: The defect edges were debeveled with a #15 scalpel blade.  Given the location of the defect, shape of the defect a keystone flap was deemed most appropriate.  Using a sterile surgical marker, an appropriate keystone flap was drawn incorporating the defect, outlining the appropriate donor tissue and placing the expected incisions within the relaxed skin tension lines where possible. The area thus outlined was incised deep to adipose tissue with a #15 scalpel blade.  The skin margins were undermined to an appropriate distance in all directions around the primary defect and laterally outward around the flap utilizing iris scissors.
Partial Purse String (Simple) Text: Given the location of the defect and the characteristics of the surrounding skin a simple purse string closure was deemed most appropriate.  Undermining was performed circumfirentially around the surgical defect.  A purse string suture was then placed and tightened. Wound tension only allowed a partial closure of the circular defect.
Mauc Instructions: By selecting yes to the question below the MAUC number will be added into the note.  This will be calculated automatically based on the diagnosis chosen, the size entered, the body zone selected (H,M,L) and the specific indications you chose. You will also have the option to override the Mohs AUC if you disagree with the automatically calculated number and this option is found in the Case Summary tab.
Mucosal Advancement Flap Text: Given the location of the defect, shape of the defect and the proximity to free margins a mucosal advancement flap was deemed most appropriate. Incisions were made with a 15 blade scalpel in the appropriate fashion along the cutaneous vermilion border and the mucosal lip. The remaining actinically damaged mucosal tissue was excised.  The mucosal advancement flap was then elevated to the gingival sulcus with care taken to preserve the neurovascular structures and advanced into the primary defect. Care was taken to ensure that precise realignment of the vermilion border was achieved.
Advancement-Rotation Flap Text: The defect edges were debeveled with a #15 scalpel blade.  Given the location of the defect, shape of the defect and the proximity to free margins an advancement-rotation flap was deemed most appropriate.  Using a sterile surgical marker, an appropriate flap was drawn incorporating the defect and placing the expected incisions within the relaxed skin tension lines where possible. The area thus outlined was incised deep to adipose tissue with a #15 scalpel blade.  The skin margins were undermined to an appropriate distance in all directions utilizing iris scissors.
Surgeon/Pathologist Verbiage (Will Incorporate Name Of Surgeon From Intro If Not Blank): operated in two distinct and integrated capacities as the surgeon and pathologist.
Rhombic Flap Text: The defect edges were debeveled with a #15 scalpel blade.  Given the location of the defect and the proximity to free margins a rhombic flap was deemed most appropriate.  Using a sterile surgical marker, an appropriate rhombic flap was drawn incorporating the defect.    The area thus outlined was incised deep to adipose tissue with a #15 scalpel blade.  The skin margins were undermined to an appropriate distance in all directions utilizing iris scissors.
Graft Donor Site Bandage (Optional-Leave Blank If You Don't Want In Note): Aquaphor and telefa placed on wound. Pressure dressing applied to donor site
Composite Graft Text: The defect edges were debeveled with a #15 scalpel blade.  Given the location of the defect, shape of the defect, the proximity to free margins and the fact the defect was full thickness a composite graft was deemed most appropriate.  The defect was outline and then transferred to the donor site.  A full thickness graft was then excised from the donor site. The graft was then placed in the primary defect, oriented appropriately and then sutured into place.  The secondary defect was then repaired using a primary closure.

## 2018-01-30 ENCOUNTER — APPOINTMENT (RX ONLY)
Dept: URBAN - METROPOLITAN AREA CLINIC 36 | Facility: CLINIC | Age: 83
Setting detail: DERMATOLOGY
End: 2018-01-30

## 2018-01-30 DIAGNOSIS — Z48.02 ENCOUNTER FOR REMOVAL OF SUTURES: ICD-10-CM

## 2018-01-30 PROCEDURE — 99024 POSTOP FOLLOW-UP VISIT: CPT

## 2018-01-30 PROCEDURE — ? SUTURE REMOVAL (GLOBAL PERIOD)

## 2018-01-30 ASSESSMENT — LOCATION SIMPLE DESCRIPTION DERM: LOCATION SIMPLE: SCALP

## 2018-01-30 ASSESSMENT — LOCATION DETAILED DESCRIPTION DERM: LOCATION DETAILED: LEFT INFERIOR POSTAURICULAR SKIN

## 2018-01-30 ASSESSMENT — LOCATION ZONE DERM: LOCATION ZONE: SCALP

## 2018-01-30 NOTE — PROCEDURE: SUTURE REMOVAL (GLOBAL PERIOD)
Add 00865 Cpt? (Important Note: In 2017 The Use Of 42449 Is Being Tracked By Cms To Determine Future Global Period Reimbursement For Global Periods): yes
Detail Level: Detailed

## 2018-02-06 ENCOUNTER — APPOINTMENT (RX ONLY)
Dept: URBAN - METROPOLITAN AREA CLINIC 4 | Facility: CLINIC | Age: 83
Setting detail: DERMATOLOGY
End: 2018-02-06

## 2018-02-06 DIAGNOSIS — L81.4 OTHER MELANIN HYPERPIGMENTATION: ICD-10-CM

## 2018-02-06 DIAGNOSIS — Z08 ENCOUNTER FOR FOLLOW-UP EXAMINATION AFTER COMPLETED TREATMENT FOR MALIGNANT NEOPLASM: ICD-10-CM | Status: RESOLVING

## 2018-02-06 DIAGNOSIS — L82.1 OTHER SEBORRHEIC KERATOSIS: ICD-10-CM

## 2018-02-06 PROCEDURE — 99213 OFFICE O/P EST LOW 20 MIN: CPT

## 2018-02-06 PROCEDURE — ? COUNSELING

## 2018-02-06 ASSESSMENT — LOCATION DETAILED DESCRIPTION DERM: LOCATION DETAILED: LEFT SUPERIOR LATERAL NECK

## 2018-02-06 ASSESSMENT — LOCATION SIMPLE DESCRIPTION DERM: LOCATION SIMPLE: NECK

## 2018-02-06 ASSESSMENT — LOCATION ZONE DERM: LOCATION ZONE: NECK

## 2018-02-06 NOTE — HPI: SKIN LESION
Is This A New Presentation, Or A Follow-Up?: Follow Up Skin Lesion
How Severe Is Your Skin Lesion?: mild
Has Your Skin Lesion Been Treated?: been treated
Additional History: Follow up on bcc left post auricular. Had mohs with dr denise last month.
When Was It Treated?: 01/2018

## 2018-02-08 ENCOUNTER — TELEPHONE (OUTPATIENT)
Dept: INTERNAL MEDICINE | Facility: MEDICAL CENTER | Age: 83
End: 2018-02-08

## 2018-02-08 NOTE — TELEPHONE ENCOUNTER
Called and spoke with [facility].(#[phone])    Her last appt was 01/16. She don't believe they order anything for their pt's. But she is going to double check with Juan, the physical therapist.

## 2018-02-08 NOTE — TELEPHONE ENCOUNTER
1. Caller Name: Pansy J Robinson                      Call Back Number: [phone] (home)     2. Message: Pt called worried that she received a phone call yesterday from an unknown co stating that we ordered a back brace for her. I explained that this is a scam, we unfortunately have pt's that get hounded by these co's and unless they reach out to us we have no recourse. She did give them her Medicare ID#. I recommended she contact Medicare to alert them of this and if we receive any correspondence, we can then file a report.     3. Patient approves office to leave a detailed voicemail/MyChart message: yes

## 2018-02-08 NOTE — TELEPHONE ENCOUNTER
Yes, it might be a scam.    She could check with the PT place that she was referred to see if they did an order.

## 2018-03-06 ENCOUNTER — HOSPITAL ENCOUNTER (OUTPATIENT)
Dept: LAB | Facility: MEDICAL CENTER | Age: 83
End: 2018-03-06
Attending: INTERNAL MEDICINE
Payer: MEDICARE

## 2018-03-06 LAB
25(OH)D3 SERPL-MCNC: 27 NG/ML (ref 30–100)
ALBUMIN SERPL BCP-MCNC: 4.5 G/DL (ref 3.2–4.9)
ALBUMIN/GLOB SERPL: 1.6 G/DL
ALP SERPL-CCNC: 53 U/L (ref 30–99)
ALT SERPL-CCNC: 16 U/L (ref 2–50)
ANION GAP SERPL CALC-SCNC: 8 MMOL/L (ref 0–11.9)
APPEARANCE UR: CLEAR
AST SERPL-CCNC: 24 U/L (ref 12–45)
BACTERIA #/AREA URNS HPF: NEGATIVE /HPF
BASOPHILS # BLD AUTO: 0.8 % (ref 0–1.8)
BASOPHILS # BLD: 0.03 K/UL (ref 0–0.12)
BILIRUB SERPL-MCNC: 0.8 MG/DL (ref 0.1–1.5)
BILIRUB UR QL STRIP.AUTO: NEGATIVE
BUN SERPL-MCNC: 27 MG/DL (ref 8–22)
CALCIUM SERPL-MCNC: 9.9 MG/DL (ref 8.5–10.5)
CHLORIDE SERPL-SCNC: 105 MMOL/L (ref 96–112)
CHOLEST SERPL-MCNC: 168 MG/DL (ref 100–199)
CO2 SERPL-SCNC: 27 MMOL/L (ref 20–33)
COLOR UR: YELLOW
CREAT SERPL-MCNC: 1.31 MG/DL (ref 0.5–1.4)
CREAT UR-MCNC: 70.6 MG/DL
EOSINOPHIL # BLD AUTO: 0.1 K/UL (ref 0–0.51)
EOSINOPHIL NFR BLD: 2.5 % (ref 0–6.9)
EPI CELLS #/AREA URNS HPF: NEGATIVE /HPF
ERYTHROCYTE [DISTWIDTH] IN BLOOD BY AUTOMATED COUNT: 41.8 FL (ref 35.9–50)
GLOBULIN SER CALC-MCNC: 2.9 G/DL (ref 1.9–3.5)
GLUCOSE SERPL-MCNC: 92 MG/DL (ref 65–99)
GLUCOSE UR STRIP.AUTO-MCNC: NEGATIVE MG/DL
HCT VFR BLD AUTO: 41.1 % (ref 37–47)
HDLC SERPL-MCNC: 46 MG/DL
HGB BLD-MCNC: 13 G/DL (ref 12–16)
HYALINE CASTS #/AREA URNS LPF: NORMAL /LPF
IMM GRANULOCYTES # BLD AUTO: 0.01 K/UL (ref 0–0.11)
IMM GRANULOCYTES NFR BLD AUTO: 0.3 % (ref 0–0.9)
KETONES UR STRIP.AUTO-MCNC: NEGATIVE MG/DL
LDLC SERPL CALC-MCNC: 91 MG/DL
LEUKOCYTE ESTERASE UR QL STRIP.AUTO: ABNORMAL
LYMPHOCYTES # BLD AUTO: 1.08 K/UL (ref 1–4.8)
LYMPHOCYTES NFR BLD: 27.4 % (ref 22–41)
MAGNESIUM SERPL-MCNC: 2.1 MG/DL (ref 1.5–2.5)
MCH RBC QN AUTO: 27.5 PG (ref 27–33)
MCHC RBC AUTO-ENTMCNC: 31.6 G/DL (ref 33.6–35)
MCV RBC AUTO: 86.9 FL (ref 81.4–97.8)
MICRO URNS: ABNORMAL
MONOCYTES # BLD AUTO: 0.41 K/UL (ref 0–0.85)
MONOCYTES NFR BLD AUTO: 10.4 % (ref 0–13.4)
NEUTROPHILS # BLD AUTO: 2.31 K/UL (ref 2–7.15)
NEUTROPHILS NFR BLD: 58.6 % (ref 44–72)
NITRITE UR QL STRIP.AUTO: NEGATIVE
NRBC # BLD AUTO: 0 K/UL
NRBC BLD-RTO: 0 /100 WBC
PH UR STRIP.AUTO: 5 [PH]
PHOSPHATE SERPL-MCNC: 3.4 MG/DL (ref 2.5–4.5)
PLATELET # BLD AUTO: 210 K/UL (ref 164–446)
PMV BLD AUTO: 10.1 FL (ref 9–12.9)
POTASSIUM SERPL-SCNC: 4.5 MMOL/L (ref 3.6–5.5)
PROT SERPL-MCNC: 7.4 G/DL (ref 6–8.2)
PROT UR QL STRIP: 30 MG/DL
PROT UR-MCNC: 36.6 MG/DL (ref 0–15)
PROT/CREAT UR: 518 MG/G (ref 10–107)
RBC # BLD AUTO: 4.73 M/UL (ref 4.2–5.4)
RBC # URNS HPF: NORMAL /HPF
RBC UR QL AUTO: NEGATIVE
SODIUM SERPL-SCNC: 140 MMOL/L (ref 135–145)
SP GR UR STRIP.AUTO: 1.01
TRIGL SERPL-MCNC: 157 MG/DL (ref 0–149)
UROBILINOGEN UR STRIP.AUTO-MCNC: 0.2 MG/DL
WBC # BLD AUTO: 3.9 K/UL (ref 4.8–10.8)
WBC #/AREA URNS HPF: NORMAL /HPF

## 2018-03-06 PROCEDURE — 83735 ASSAY OF MAGNESIUM: CPT

## 2018-03-06 PROCEDURE — 82306 VITAMIN D 25 HYDROXY: CPT

## 2018-03-06 PROCEDURE — 80053 COMPREHEN METABOLIC PANEL: CPT

## 2018-03-06 PROCEDURE — 81001 URINALYSIS AUTO W/SCOPE: CPT

## 2018-03-06 PROCEDURE — 85025 COMPLETE CBC W/AUTO DIFF WBC: CPT

## 2018-03-06 PROCEDURE — 82570 ASSAY OF URINE CREATININE: CPT

## 2018-03-06 PROCEDURE — 80061 LIPID PANEL: CPT

## 2018-03-06 PROCEDURE — 84100 ASSAY OF PHOSPHORUS: CPT

## 2018-03-06 PROCEDURE — 36415 COLL VENOUS BLD VENIPUNCTURE: CPT

## 2018-03-06 PROCEDURE — 84156 ASSAY OF PROTEIN URINE: CPT

## 2018-04-06 ENCOUNTER — OFFICE VISIT (OUTPATIENT)
Dept: INTERNAL MEDICINE | Facility: MEDICAL CENTER | Age: 83
End: 2018-04-06
Payer: MEDICARE

## 2018-04-06 VITALS
WEIGHT: 115.38 LBS | SYSTOLIC BLOOD PRESSURE: 142 MMHG | OXYGEN SATURATION: 95 % | TEMPERATURE: 97.7 F | HEART RATE: 87 BPM | DIASTOLIC BLOOD PRESSURE: 78 MMHG | HEIGHT: 61 IN | BODY MASS INDEX: 21.79 KG/M2

## 2018-04-06 DIAGNOSIS — G89.29 CHRONIC NECK PAIN: ICD-10-CM

## 2018-04-06 DIAGNOSIS — R63.4 WEIGHT LOSS: ICD-10-CM

## 2018-04-06 DIAGNOSIS — R25.2 LEG CRAMPS: ICD-10-CM

## 2018-04-06 DIAGNOSIS — R91.8 PULMONARY NODULES: ICD-10-CM

## 2018-04-06 DIAGNOSIS — R93.89 ABNORMAL CT OF THE CHEST: ICD-10-CM

## 2018-04-06 DIAGNOSIS — M54.2 CHRONIC NECK PAIN: ICD-10-CM

## 2018-04-06 DIAGNOSIS — R91.8 GROUND GLASS OPACITY PRESENT ON IMAGING OF LUNG: ICD-10-CM

## 2018-04-06 DIAGNOSIS — K59.00 CONSTIPATION, UNSPECIFIED CONSTIPATION TYPE: ICD-10-CM

## 2018-04-06 PROCEDURE — 99214 OFFICE O/P EST MOD 30 MIN: CPT | Performed by: INTERNAL MEDICINE

## 2018-04-06 NOTE — PATIENT INSTRUCTIONS
Get CT in July -- ordered    I recommend the Shingrix vaccine series to prevent shingles.  Please get the vaccines at a local pharmacy.
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
## 2018-04-25 ENCOUNTER — APPOINTMENT (RX ONLY)
Dept: URBAN - METROPOLITAN AREA CLINIC 36 | Facility: CLINIC | Age: 83
Setting detail: DERMATOLOGY
End: 2018-04-25

## 2018-04-25 DIAGNOSIS — Z48.817 ENCOUNTER FOR SURGICAL AFTERCARE FOLLOWING SURGERY ON THE SKIN AND SUBCUTANEOUS TISSUE: ICD-10-CM

## 2018-04-25 PROCEDURE — ? POST-OP WOUND CHECK

## 2018-04-25 PROCEDURE — 99024 POSTOP FOLLOW-UP VISIT: CPT

## 2018-04-25 ASSESSMENT — LOCATION ZONE DERM: LOCATION ZONE: FACE

## 2018-04-25 ASSESSMENT — LOCATION SIMPLE DESCRIPTION DERM: LOCATION SIMPLE: LEFT FOREHEAD

## 2018-04-25 ASSESSMENT — LOCATION DETAILED DESCRIPTION DERM: LOCATION DETAILED: LEFT INFERIOR MEDIAL FOREHEAD

## 2018-04-25 NOTE — PROCEDURE: POST-OP WOUND CHECK
Add [id number] Cpt? (Important Note: In 2017 The Use Of 99488 Is Being Tracked By Cms To Determine Future Global Period Reimbursement For Global Periods): yes
Detail Level: Generalized
Additional Comments: Patient to follow up with PMD and PRN with me

## 2018-07-09 ENCOUNTER — HOSPITAL ENCOUNTER (OUTPATIENT)
Dept: RADIOLOGY | Facility: MEDICAL CENTER | Age: 83
End: 2018-07-09
Attending: INTERNAL MEDICINE
Payer: MEDICARE

## 2018-07-09 DIAGNOSIS — R93.89 ABNORMAL CT OF THE CHEST: ICD-10-CM

## 2018-07-09 DIAGNOSIS — R63.4 WEIGHT LOSS: ICD-10-CM

## 2018-07-09 DIAGNOSIS — R91.8 PULMONARY NODULES: ICD-10-CM

## 2018-07-09 DIAGNOSIS — R91.8 GROUND GLASS OPACITY PRESENT ON IMAGING OF LUNG: ICD-10-CM

## 2018-07-09 PROCEDURE — 71250 CT THORAX DX C-: CPT

## 2018-07-20 ENCOUNTER — OFFICE VISIT (OUTPATIENT)
Dept: INTERNAL MEDICINE | Facility: MEDICAL CENTER | Age: 83
End: 2018-07-20
Payer: MEDICARE

## 2018-07-20 VITALS
DIASTOLIC BLOOD PRESSURE: 78 MMHG | BODY MASS INDEX: 21.38 KG/M2 | OXYGEN SATURATION: 95 % | WEIGHT: 113.25 LBS | HEART RATE: 78 BPM | SYSTOLIC BLOOD PRESSURE: 134 MMHG | HEIGHT: 61 IN | TEMPERATURE: 98.2 F

## 2018-07-20 DIAGNOSIS — R93.89 ABNORMAL CT OF THE CHEST: ICD-10-CM

## 2018-07-20 DIAGNOSIS — R54 FRAILTY: ICD-10-CM

## 2018-07-20 DIAGNOSIS — R63.4 WEIGHT LOSS: ICD-10-CM

## 2018-07-20 DIAGNOSIS — R91.8 PULMONARY NODULES: ICD-10-CM

## 2018-07-20 PROCEDURE — 99214 OFFICE O/P EST MOD 30 MIN: CPT | Performed by: INTERNAL MEDICINE

## 2018-07-20 NOTE — PATIENT INSTRUCTIONS
Get [facility] labs.     I recommend the Shingrix vaccine series to prevent shingles.  Please get the vaccines at a local pharmacy.        Make sure you eat.     Come back in 3 months or sooner if needed.

## 2018-07-20 NOTE — PROGRESS NOTES
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 present for some time  Could be 2/2 CKD, atrophic vaginitis  Explained to pt is could also be a marker of malignancy  Declines further eval (e.g., CT urogram and uro eval for cystoscopy)  Resolved in March     CKD (chronic kidney disease) stage 3, GFR 30-59 ml/min  Sees neph    Dry cough  In last few years  CXR done June 2017 ago was fine  CT - see above  Denies GERD although cough could be only sx of GERD  Suspect allergies could be contributing - rec Claritin, Flonase and NS  Not an issue today     Leukopenia, unspecified type  Low grade for many years  No localizing s/sx except for WL that she does not want to pursue  Watch for now    Chronic pain of left knee  Related to arthritic changes  Watch for now  Cont Tylenol     Bereavement  Loss of DTR early 2017    Osteopenia with high risk of fracture  5 and 15% Oct 2016  Can't take bisphos given gfr < 35  Not interested in injections for OP  Ca in diet  Vitamin D supplementation   Consider repeat dexa 2 years - if markedly worse, then, re-address    Hyperlipidemia, unspecified hyperlipidemia type  LDL high - can't calc ASCVD given risk   Doesn't want meds    Osteoarthritis of multiple joints, unspecified osteoarthritis type  Occ ache and pain  On Tylenol   Knows that she shouldn't take  NSAIDs    Preventative health care  Flu shot 2017  Rec TDAP  Prevnar 2017  UTD with Zostavax and Pneumovax per Dr. CANDELARIA's note  Hasn't been doing mammos for years - declines  [place] done - see above  Pap not indicated  DEXA 2016 - see above  Sees the eye doctor    Patient Instructions   Get Teddy's labs.     I recommend the Shingrix vaccine series to prevent shingles.  Please get the vaccines at a local pharmacy.      Make sure you eat.     Come back in 3 months or sooner if needed.        Follow-up  Return in about 3 months (around 10/20/2018).    Signed by: Anabella Sanchez M.D.

## 2018-09-18 ENCOUNTER — NON-PROVIDER VISIT (OUTPATIENT)
Dept: INTERNAL MEDICINE | Facility: MEDICAL CENTER | Age: 83
End: 2018-09-18
Payer: MEDICARE

## 2018-09-18 ENCOUNTER — TELEPHONE (OUTPATIENT)
Dept: INTERNAL MEDICINE | Facility: MEDICAL CENTER | Age: 83
End: 2018-09-18

## 2018-09-18 DIAGNOSIS — Z23 NEED FOR IMMUNIZATION AGAINST INFLUENZA: ICD-10-CM

## 2018-09-18 PROCEDURE — 90662 IIV NO PRSV INCREASED AG IM: CPT | Performed by: INTERNAL MEDICINE

## 2018-09-18 PROCEDURE — G0008 ADMIN INFLUENZA VIRUS VAC: HCPCS | Performed by: INTERNAL MEDICINE

## 2018-09-18 NOTE — NON-PROVIDER
"Lela Orozco is a 86 y.o. female here for a non-provider visit for:   FLU    Reason for immunization: Annual Flu Vaccine  Immunization records indicate need for vaccine: Yes, confirmed with Epic  Minimum interval has been met for this vaccine: Yes  ABN completed: Not Indicated    Order and dose verified by: MEEK CHIN Dated  08/07/15 was given to patient: Yes  All IAC Questionnaire questions were answered \"No.\"    Patient tolerated injection and no adverse effects were observed or reported: Yes    Pt scheduled for next dose in series: No    "

## 2018-09-21 ENCOUNTER — HOSPITAL ENCOUNTER (OUTPATIENT)
Dept: LAB | Facility: MEDICAL CENTER | Age: 83
End: 2018-09-21
Attending: INTERNAL MEDICINE
Payer: MEDICARE

## 2018-09-21 LAB
25(OH)D3 SERPL-MCNC: 38 NG/ML (ref 30–100)
ALBUMIN SERPL BCP-MCNC: 4.1 G/DL (ref 3.2–4.9)
ALBUMIN/GLOB SERPL: 1.4 G/DL
ALP SERPL-CCNC: 60 U/L (ref 30–99)
ALT SERPL-CCNC: 15 U/L (ref 2–50)
ANION GAP SERPL CALC-SCNC: 9 MMOL/L (ref 0–11.9)
APPEARANCE UR: CLEAR
AST SERPL-CCNC: 21 U/L (ref 12–45)
BACTERIA #/AREA URNS HPF: NEGATIVE /HPF
BASOPHILS # BLD AUTO: 0.4 % (ref 0–1.8)
BASOPHILS # BLD: 0.02 K/UL (ref 0–0.12)
BILIRUB SERPL-MCNC: 0.6 MG/DL (ref 0.1–1.5)
BILIRUB UR QL STRIP.AUTO: NEGATIVE
BUN SERPL-MCNC: 29 MG/DL (ref 8–22)
CALCIUM SERPL-MCNC: 9.7 MG/DL (ref 8.5–10.5)
CHLORIDE SERPL-SCNC: 102 MMOL/L (ref 96–112)
CO2 SERPL-SCNC: 23 MMOL/L (ref 20–33)
COLOR UR: YELLOW
CREAT SERPL-MCNC: 1.4 MG/DL (ref 0.5–1.4)
CREAT UR-MCNC: 54.9 MG/DL
EOSINOPHIL # BLD AUTO: 0.18 K/UL (ref 0–0.51)
EOSINOPHIL NFR BLD: 3.6 % (ref 0–6.9)
EPI CELLS #/AREA URNS HPF: NEGATIVE /HPF
ERYTHROCYTE [DISTWIDTH] IN BLOOD BY AUTOMATED COUNT: 42.5 FL (ref 35.9–50)
GLOBULIN SER CALC-MCNC: 2.9 G/DL (ref 1.9–3.5)
GLUCOSE SERPL-MCNC: 114 MG/DL (ref 65–99)
GLUCOSE UR STRIP.AUTO-MCNC: NEGATIVE MG/DL
HCT VFR BLD AUTO: 38.1 % (ref 37–47)
HGB BLD-MCNC: 12.5 G/DL (ref 12–16)
HYALINE CASTS #/AREA URNS LPF: NORMAL /LPF
IMM GRANULOCYTES # BLD AUTO: 0 K/UL (ref 0–0.11)
IMM GRANULOCYTES NFR BLD AUTO: 0 % (ref 0–0.9)
KETONES UR STRIP.AUTO-MCNC: NEGATIVE MG/DL
LEUKOCYTE ESTERASE UR QL STRIP.AUTO: ABNORMAL
LYMPHOCYTES # BLD AUTO: 1.26 K/UL (ref 1–4.8)
LYMPHOCYTES NFR BLD: 25.1 % (ref 22–41)
MAGNESIUM SERPL-MCNC: 2.2 MG/DL (ref 1.5–2.5)
MCH RBC QN AUTO: 28.5 PG (ref 27–33)
MCHC RBC AUTO-ENTMCNC: 32.8 G/DL (ref 33.6–35)
MCV RBC AUTO: 86.8 FL (ref 81.4–97.8)
MICRO URNS: ABNORMAL
MONOCYTES # BLD AUTO: 0.48 K/UL (ref 0–0.85)
MONOCYTES NFR BLD AUTO: 9.6 % (ref 0–13.4)
NEUTROPHILS # BLD AUTO: 3.08 K/UL (ref 2–7.15)
NEUTROPHILS NFR BLD: 61.3 % (ref 44–72)
NITRITE UR QL STRIP.AUTO: NEGATIVE
NRBC # BLD AUTO: 0 K/UL
NRBC BLD-RTO: 0 /100 WBC
PH UR STRIP.AUTO: 6 [PH]
PHOSPHATE SERPL-MCNC: 3.9 MG/DL (ref 2.5–4.5)
PLATELET # BLD AUTO: 211 K/UL (ref 164–446)
PMV BLD AUTO: 10.2 FL (ref 9–12.9)
POTASSIUM SERPL-SCNC: 4.5 MMOL/L (ref 3.6–5.5)
PROT SERPL-MCNC: 7 G/DL (ref 6–8.2)
PROT UR QL STRIP: NEGATIVE MG/DL
PROT UR-MCNC: 15.2 MG/DL (ref 0–15)
PROT/CREAT UR: 277 MG/G (ref 10–107)
PTH-INTACT SERPL-MCNC: 28.5 PG/ML (ref 14–72)
RBC # BLD AUTO: 4.39 M/UL (ref 4.2–5.4)
RBC # URNS HPF: NORMAL /HPF
RBC UR QL AUTO: NEGATIVE
SODIUM SERPL-SCNC: 134 MMOL/L (ref 135–145)
SP GR UR STRIP.AUTO: 1.01
URATE SERPL-MCNC: 6.2 MG/DL (ref 1.9–8.2)
UROBILINOGEN UR STRIP.AUTO-MCNC: 0.2 MG/DL
WBC # BLD AUTO: 5 K/UL (ref 4.8–10.8)
WBC #/AREA URNS HPF: NORMAL /HPF

## 2018-09-21 PROCEDURE — 82306 VITAMIN D 25 HYDROXY: CPT

## 2018-09-21 PROCEDURE — 36415 COLL VENOUS BLD VENIPUNCTURE: CPT

## 2018-09-21 PROCEDURE — 83735 ASSAY OF MAGNESIUM: CPT

## 2018-09-21 PROCEDURE — 83970 ASSAY OF PARATHORMONE: CPT

## 2018-09-21 PROCEDURE — 84550 ASSAY OF BLOOD/URIC ACID: CPT

## 2018-09-21 PROCEDURE — 81001 URINALYSIS AUTO W/SCOPE: CPT

## 2018-09-21 PROCEDURE — 84100 ASSAY OF PHOSPHORUS: CPT

## 2018-09-21 PROCEDURE — 82570 ASSAY OF URINE CREATININE: CPT

## 2018-09-21 PROCEDURE — 84156 ASSAY OF PROTEIN URINE: CPT

## 2018-09-21 PROCEDURE — 80053 COMPREHEN METABOLIC PANEL: CPT

## 2018-09-21 PROCEDURE — 85025 COMPLETE CBC W/AUTO DIFF WBC: CPT

## 2018-11-08 ENCOUNTER — APPOINTMENT (RX ONLY)
Dept: URBAN - METROPOLITAN AREA CLINIC 4 | Facility: CLINIC | Age: 83
Setting detail: DERMATOLOGY
End: 2018-11-08

## 2018-11-08 PROBLEM — D48.5 NEOPLASM OF UNCERTAIN BEHAVIOR OF SKIN: Status: ACTIVE | Noted: 2018-11-08

## 2018-11-08 PROCEDURE — ? BIOPSY BY SHAVE METHOD AND DESTRUCTION

## 2018-11-08 PROCEDURE — 11100: CPT

## 2018-11-08 NOTE — PROCEDURE: BIOPSY BY SHAVE METHOD AND DESTRUCTION
Render Post-Care Instructions In Note?: no
Anesthesia Type: 1% lidocaine with epinephrine
Size Of Lesion In Cm (Optional): 0.4
Destruction Type: curettage
Consent: Written consent was obtained and risks were reviewed including but not limited to scarring, infection, bleeding, scabbing, incomplete removal, nerve damage and allergy to anesthesia.
Number Of Curettages: 3
Notification Instructions: Patient will be notified of biopsy results. However, patient instructed to call the office if not contacted within 2 weeks.
Biopsy Type: H and E
Size Of Lesion After Curettage: 0.6
Billing Type: Third-Party Bill
Wound Care: Vaseline
Post-Care Instructions: I reviewed with the patient in detail post-care instructions. Patient is to keep the biopsy site dry overnight, and then apply bacitracin twice daily until healed. Patient may apply hydrogen peroxide soaks to remove any crusting.
Lab Facility: 
Detail Level: Detailed
Hemostasis: Aluminum Chloride and Electrocautery
Lab: [id number]
Anesthesia Volume In Cc: 2
Bill As?: Biopsy by Shave Method

## 2018-11-08 NOTE — HPI: SKIN LESION
Is This A New Presentation, Or A Follow-Up?: Skin Lesion
What Type Of Note Output Would You Prefer (Optional)?: Standard Output
How Severe Is Your Skin Lesion?: severe
Has Your Skin Lesion Been Treated?: not been treated
Additional History: Lesion on left lower leg for one month.

## 2018-11-12 ENCOUNTER — OFFICE VISIT (OUTPATIENT)
Dept: INTERNAL MEDICINE | Facility: MEDICAL CENTER | Age: 83
End: 2018-11-12
Payer: MEDICARE

## 2018-11-12 VITALS
OXYGEN SATURATION: 95 % | DIASTOLIC BLOOD PRESSURE: 80 MMHG | HEART RATE: 82 BPM | TEMPERATURE: 98.7 F | SYSTOLIC BLOOD PRESSURE: 146 MMHG | WEIGHT: 114 LBS | HEIGHT: 61 IN | BODY MASS INDEX: 21.52 KG/M2

## 2018-11-12 DIAGNOSIS — N18.30 CKD (CHRONIC KIDNEY DISEASE) STAGE 3, GFR 30-59 ML/MIN (HCC): ICD-10-CM

## 2018-11-12 DIAGNOSIS — R25.2 LEG CRAMPS: ICD-10-CM

## 2018-11-12 DIAGNOSIS — M15.9 GENERALIZED OSTEOARTHRITIS: ICD-10-CM

## 2018-11-12 DIAGNOSIS — R63.4 WEIGHT LOSS: ICD-10-CM

## 2018-11-12 DIAGNOSIS — H54.7 VISION IMPAIRMENT: ICD-10-CM

## 2018-11-12 PROCEDURE — 99214 OFFICE O/P EST MOD 30 MIN: CPT | Performed by: INTERNAL MEDICINE

## 2018-11-12 ASSESSMENT — PATIENT HEALTH QUESTIONNAIRE - PHQ9: CLINICAL INTERPRETATION OF PHQ2 SCORE: 0

## 2018-11-12 NOTE — PATIENT INSTRUCTIONS
Stretch legs prior to going to bed.     I recommend the Shingrix vaccine series to prevent shingles.  Please get the vaccines at a local pharmacy.        Make sure you eat.     Come back in 3-4 months or sooner if needed.      Let know names of eye drops and over the counter leg cramp spray.

## 2018-11-12 NOTE — LETTER
[facility]  Anabella Sanchez M.D.  [address]  [place] [address]  Fax: [phone]   Authorization for Release/Disclosure of   Protected Health Information   Name: CHIP MCCORD : 1932 SSN: [id number]   Address: [address] [place], [place] [address]  [place] [address] Phone:    [phone] (home)    I authorize the entity listed below to release/disclose the PHI below to:   [facility]/Anabella Sanchez M.D. and Anabella Sanchez M.D.   Provider or Entity Name:     Address   City, State, Zip   Phone:      Fax:     Reason for request: continuity of care   Information to be released:    [  ] LAST COLONOSCOPY,  including any PATH REPORT and follow-up  [  ] LAST FIT/COLOGUARD RESULT [  ] LAST DEXA  [  ] LAST MAMMOGRAM  [  ] LAST PAP  [  ] LAST LABS [  ] RETINA EXAM REPORT  [  ] IMMUNIZATION RECORDS  [ X ] Release all info      [  ] Check here and initial the line next to each item to release ALL health information INCLUDING  _____ Care and treatment for drug and / or alcohol abuse  _____ HIV testing, infection status, or AIDS  _____ Genetic Testing    DATES OF SERVICE OR TIME PERIOD TO BE DISCLOSED: _____________  I understand and acknowledge that:  * This Authorization may be revoked at any time by you in writing, except if your health information has already been used or disclosed.  * Your health information that will be used or disclosed as a result of you signing this authorization could be re-disclosed by the recipient. If this occurs, your re-disclosed health information may no longer be protected by State or Federal laws.  * You may refuse to sign this Authorization. Your refusal will not affect your ability to obtain treatment.  * This Authorization becomes effective upon signing and will  on (date) __________.      If no date is indicated, this Authorization will  one (1) year from the signature date.    Name: Chip Mccord    Signature:   Date:     2018       PLEASE FAX REQUESTED RECORDS  BACK TO: [phone]

## 2018-12-20 ENCOUNTER — APPOINTMENT (RX ONLY)
Dept: URBAN - METROPOLITAN AREA CLINIC 4 | Facility: CLINIC | Age: 83
Setting detail: DERMATOLOGY
End: 2018-12-20

## 2018-12-20 PROBLEM — C44.729 SQUAMOUS CELL CARCINOMA OF SKIN OF LEFT LOWER LIMB, INCLUDING HIP: Status: ACTIVE | Noted: 2018-12-20

## 2018-12-20 PROCEDURE — 99212 OFFICE O/P EST SF 10 MIN: CPT

## 2018-12-20 PROCEDURE — ? COUNSELING

## 2019-03-25 ENCOUNTER — HOSPITAL ENCOUNTER (OUTPATIENT)
Dept: LAB | Facility: MEDICAL CENTER | Age: 84
End: 2019-03-25
Attending: INTERNAL MEDICINE
Payer: MEDICARE

## 2019-03-25 LAB
25(OH)D3 SERPL-MCNC: 41 NG/ML (ref 30–100)
ALBUMIN SERPL BCP-MCNC: 4.2 G/DL (ref 3.2–4.9)
ALBUMIN/GLOB SERPL: 1.5 G/DL
ALP SERPL-CCNC: 60 U/L (ref 30–99)
ALT SERPL-CCNC: 16 U/L (ref 2–50)
ANION GAP SERPL CALC-SCNC: 7 MMOL/L (ref 0–11.9)
APPEARANCE UR: CLEAR
AST SERPL-CCNC: 24 U/L (ref 12–45)
BACTERIA #/AREA URNS HPF: NEGATIVE /HPF
BASOPHILS # BLD AUTO: 0.8 % (ref 0–1.8)
BASOPHILS # BLD: 0.03 K/UL (ref 0–0.12)
BILIRUB SERPL-MCNC: 0.8 MG/DL (ref 0.1–1.5)
BILIRUB UR QL STRIP.AUTO: NEGATIVE
BUN SERPL-MCNC: 25 MG/DL (ref 8–22)
CALCIUM SERPL-MCNC: 9.9 MG/DL (ref 8.5–10.5)
CHLORIDE SERPL-SCNC: 103 MMOL/L (ref 96–112)
CHOLEST SERPL-MCNC: 164 MG/DL (ref 100–199)
CO2 SERPL-SCNC: 27 MMOL/L (ref 20–33)
COLOR UR: YELLOW
CREAT SERPL-MCNC: 1.3 MG/DL (ref 0.5–1.4)
CREAT UR-MCNC: 92.6 MG/DL
EOSINOPHIL # BLD AUTO: 0.11 K/UL (ref 0–0.51)
EOSINOPHIL NFR BLD: 3 % (ref 0–6.9)
EPI CELLS #/AREA URNS HPF: NEGATIVE /HPF
ERYTHROCYTE [DISTWIDTH] IN BLOOD BY AUTOMATED COUNT: 41.5 FL (ref 35.9–50)
FASTING STATUS PATIENT QL REPORTED: NORMAL
FERRITIN SERPL-MCNC: 16.7 NG/ML (ref 10–291)
GLOBULIN SER CALC-MCNC: 2.8 G/DL (ref 1.9–3.5)
GLUCOSE SERPL-MCNC: 93 MG/DL (ref 65–99)
GLUCOSE UR STRIP.AUTO-MCNC: NEGATIVE MG/DL
HCT VFR BLD AUTO: 43.4 % (ref 37–47)
HDLC SERPL-MCNC: 51 MG/DL
HGB BLD-MCNC: 13.9 G/DL (ref 12–16)
HYALINE CASTS #/AREA URNS LPF: NORMAL /LPF
IMM GRANULOCYTES # BLD AUTO: 0.01 K/UL (ref 0–0.11)
IMM GRANULOCYTES NFR BLD AUTO: 0.3 % (ref 0–0.9)
IRON SATN MFR SERPL: 23 % (ref 15–55)
IRON SERPL-MCNC: 98 UG/DL (ref 40–170)
KETONES UR STRIP.AUTO-MCNC: NEGATIVE MG/DL
LDLC SERPL CALC-MCNC: 87 MG/DL
LEUKOCYTE ESTERASE UR QL STRIP.AUTO: ABNORMAL
LYMPHOCYTES # BLD AUTO: 0.87 K/UL (ref 1–4.8)
LYMPHOCYTES NFR BLD: 24.1 % (ref 22–41)
MAGNESIUM SERPL-MCNC: 2 MG/DL (ref 1.5–2.5)
MCH RBC QN AUTO: 27.8 PG (ref 27–33)
MCHC RBC AUTO-ENTMCNC: 32 G/DL (ref 33.6–35)
MCV RBC AUTO: 86.8 FL (ref 81.4–97.8)
MICRO URNS: ABNORMAL
MONOCYTES # BLD AUTO: 0.32 K/UL (ref 0–0.85)
MONOCYTES NFR BLD AUTO: 8.9 % (ref 0–13.4)
NEUTROPHILS # BLD AUTO: 2.27 K/UL (ref 2–7.15)
NEUTROPHILS NFR BLD: 62.9 % (ref 44–72)
NITRITE UR QL STRIP.AUTO: NEGATIVE
NRBC # BLD AUTO: 0 K/UL
NRBC BLD-RTO: 0 /100 WBC
PH UR STRIP.AUTO: 5.5 [PH]
PHOSPHATE SERPL-MCNC: 3.7 MG/DL (ref 2.5–4.5)
PLATELET # BLD AUTO: 199 K/UL (ref 164–446)
PMV BLD AUTO: 9.9 FL (ref 9–12.9)
POTASSIUM SERPL-SCNC: 4.5 MMOL/L (ref 3.6–5.5)
PROT SERPL-MCNC: 7 G/DL (ref 6–8.2)
PROT UR QL STRIP: 30 MG/DL
PTH-INTACT SERPL-MCNC: 21.2 PG/ML (ref 14–72)
RBC # BLD AUTO: 5 M/UL (ref 4.2–5.4)
RBC # URNS HPF: NORMAL /HPF
RBC UR QL AUTO: NEGATIVE
SODIUM SERPL-SCNC: 137 MMOL/L (ref 135–145)
SP GR UR STRIP.AUTO: 1.02
TIBC SERPL-MCNC: 426 UG/DL (ref 250–450)
TRIGL SERPL-MCNC: 132 MG/DL (ref 0–149)
URATE SERPL-MCNC: 6 MG/DL (ref 1.9–8.2)
UROBILINOGEN UR STRIP.AUTO-MCNC: 0.2 MG/DL
WBC # BLD AUTO: 3.6 K/UL (ref 4.8–10.8)
WBC #/AREA URNS HPF: NORMAL /HPF

## 2019-03-25 PROCEDURE — 80061 LIPID PANEL: CPT

## 2019-03-25 PROCEDURE — 83735 ASSAY OF MAGNESIUM: CPT

## 2019-03-25 PROCEDURE — 82306 VITAMIN D 25 HYDROXY: CPT

## 2019-03-25 PROCEDURE — 36415 COLL VENOUS BLD VENIPUNCTURE: CPT

## 2019-03-25 PROCEDURE — 82728 ASSAY OF FERRITIN: CPT

## 2019-03-25 PROCEDURE — 85025 COMPLETE CBC W/AUTO DIFF WBC: CPT

## 2019-03-25 PROCEDURE — 84156 ASSAY OF PROTEIN URINE: CPT

## 2019-03-25 PROCEDURE — 83540 ASSAY OF IRON: CPT

## 2019-03-25 PROCEDURE — 84550 ASSAY OF BLOOD/URIC ACID: CPT

## 2019-03-25 PROCEDURE — 81001 URINALYSIS AUTO W/SCOPE: CPT

## 2019-03-25 PROCEDURE — 84100 ASSAY OF PHOSPHORUS: CPT

## 2019-03-25 PROCEDURE — 82570 ASSAY OF URINE CREATININE: CPT

## 2019-03-25 PROCEDURE — 83970 ASSAY OF PARATHORMONE: CPT

## 2019-03-25 PROCEDURE — 83550 IRON BINDING TEST: CPT

## 2019-03-25 PROCEDURE — 80053 COMPREHEN METABOLIC PANEL: CPT

## 2019-03-28 LAB — PROT UR-MCNC: 33.9 MG/DL (ref 0–15)

## 2019-03-29 ENCOUNTER — OFFICE VISIT (OUTPATIENT)
Dept: INTERNAL MEDICINE | Facility: MEDICAL CENTER | Age: 84
End: 2019-03-29
Payer: MEDICARE

## 2019-03-29 VITALS
SYSTOLIC BLOOD PRESSURE: 122 MMHG | HEIGHT: 61 IN | BODY MASS INDEX: 21.55 KG/M2 | WEIGHT: 114.13 LBS | TEMPERATURE: 98.3 F | HEART RATE: 89 BPM | DIASTOLIC BLOOD PRESSURE: 70 MMHG | OXYGEN SATURATION: 97 %

## 2019-03-29 DIAGNOSIS — R25.2 LEG CRAMPS: ICD-10-CM

## 2019-03-29 DIAGNOSIS — J30.2 SEASONAL ALLERGIES: ICD-10-CM

## 2019-03-29 DIAGNOSIS — R91.1 PULMONARY NODULE: ICD-10-CM

## 2019-03-29 DIAGNOSIS — D72.819 LEUKOPENIA, UNSPECIFIED TYPE: ICD-10-CM

## 2019-03-29 DIAGNOSIS — M85.80 OSTEOPENIA, UNSPECIFIED LOCATION: ICD-10-CM

## 2019-03-29 DIAGNOSIS — N18.30 CKD (CHRONIC KIDNEY DISEASE) STAGE 3, GFR 30-59 ML/MIN (HCC): ICD-10-CM

## 2019-03-29 PROCEDURE — 99214 OFFICE O/P EST MOD 30 MIN: CPT | Performed by: INTERNAL MEDICINE

## 2019-03-29 ASSESSMENT — PATIENT HEALTH QUESTIONNAIRE - PHQ9: CLINICAL INTERPRETATION OF PHQ2 SCORE: 0
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
## 2019-03-29 NOTE — PATIENT INSTRUCTIONS
Due for CT chest in July.      Consider stopping baby aspirin    I recommend a TDAP (tetanus, diptheria and whooping cough/pertussis vaccine).    I recommend the Shingrix vaccine series to prevent shingles.  Please get the vaccines at a local pharmacy.           Make sure you eat.     Come back in 3-4 months or sooner if needed.      Let know names of eye drops and over the counter leg cramp spray.

## 2019-04-18 ENCOUNTER — APPOINTMENT (RX ONLY)
Dept: URBAN - METROPOLITAN AREA CLINIC 4 | Facility: CLINIC | Age: 84
Setting detail: DERMATOLOGY
End: 2019-04-18

## 2019-04-18 PROBLEM — C44.729 SQUAMOUS CELL CARCINOMA OF SKIN OF LEFT LOWER LIMB, INCLUDING HIP: Status: ACTIVE | Noted: 2019-04-18

## 2019-04-18 PROBLEM — D48.5 NEOPLASM OF UNCERTAIN BEHAVIOR OF SKIN: Status: ACTIVE | Noted: 2019-04-18

## 2019-04-18 PROCEDURE — 11102 TANGNTL BX SKIN SINGLE LES: CPT

## 2019-04-18 PROCEDURE — ? BIOPSY BY SHAVE METHOD AND DESTRUCTION

## 2019-04-18 PROCEDURE — 99212 OFFICE O/P EST SF 10 MIN: CPT | Mod: 25

## 2019-04-18 PROCEDURE — ? DEFER

## 2019-06-03 ENCOUNTER — APPOINTMENT (RX ONLY)
Dept: URBAN - METROPOLITAN AREA CLINIC 36 | Facility: CLINIC | Age: 84
Setting detail: DERMATOLOGY
End: 2019-06-03

## 2019-06-03 PROBLEM — C44.729 SQUAMOUS CELL CARCINOMA OF SKIN OF LEFT LOWER LIMB, INCLUDING HIP: Status: ACTIVE | Noted: 2019-06-03

## 2019-06-03 PROCEDURE — 11602 EXC TR-EXT MAL+MARG 1.1-2 CM: CPT

## 2019-06-03 PROCEDURE — ? EXCISION

## 2019-06-03 PROCEDURE — 11602 EXC TR-EXT MAL+MARG 1.1-2 CM: CPT | Mod: 76

## 2019-06-03 NOTE — PROCEDURE: EXCISION
Patient Will Remove Sutures At Home?: No
Complex Repair And Skin Substitute Graft Text: The defect edges were debeveled with a #15 scalpel blade.  The primary defect was closed partially with a complex linear closure.  Given the location of the remaining defect, shape of the defect and the proximity to free margins a skin substitute graft was deemed most appropriate to repair the remaining defect.  The graft was trimmed to fit the size of the remaining defect.  The graft was then placed in the primary defect, oriented appropriately, and sutured into place.
Show Asc Variables: Yes
Island Pedicle Flap With Canthal Suspension Text: The defect edges were debeveled with a #15 scalpel blade.  Given the location of the defect, shape of the defect and the proximity to free margins an island pedicle advancement flap was deemed most appropriate.  Using a sterile surgical marker, an appropriate advancement flap was drawn incorporating the defect, outlining the appropriate donor tissue and placing the expected incisions within the relaxed skin tension lines where possible. The area thus outlined was incised deep to adipose tissue with a #15 scalpel blade.  The skin margins were undermined to an appropriate distance in all directions around the primary defect and laterally outward around the island pedicle utilizing iris scissors.  There was minimal undermining beneath the pedicle flap. A suspension suture was placed in the canthal tendon to prevent tension and prevent ectropion.
Complex Repair And Xenograft Text: The defect edges were debeveled with a #15 scalpel blade.  The primary defect was closed partially with a complex linear closure.  Given the location of the defect, shape of the defect and the proximity to free margins a xenograft was deemed most appropriate to repair the remaining defect.  The graft was trimmed to fit the size of the remaining defect.  The graft was then placed in the primary defect, oriented appropriately, and sutured into place.
Island Pedicle Flap Text: The defect edges were debeveled with a #15 scalpel blade.  Given the location of the defect, shape of the defect and the proximity to free margins an island pedicle advancement flap was deemed most appropriate.  Using a sterile surgical marker, an appropriate advancement flap was drawn incorporating the defect, outlining the appropriate donor tissue and placing the expected incisions within the relaxed skin tension lines where possible.    The area thus outlined was incised deep to adipose tissue with a #15 scalpel blade.  The skin margins were undermined to an appropriate distance in all directions around the primary defect and laterally outward around the island pedicle utilizing iris scissors.  There was minimal undermining beneath the pedicle flap.
Slit Excision Additional Text (Leave Blank If You Do Not Want): A linear line was drawn on the skin overlying the lesion. An incision was made slowly until the lesion was visualized.  Once visualized, the lesion was removed with blunt dissection.
Posterior Auricular Interpolation Flap Text: A decision was made to reconstruct the defect utilizing an interpolation axial flap and a staged reconstruction.  A telfa template was made of the defect.  This telfa template was then used to outline the posterior auricular interpolation flap.  The donor area for the pedicle flap was then injected with anesthesia.  The flap was excised through the skin and subcutaneous tissue down to the layer of the underlying musculature.  The pedicle flap was carefully excised within this deep plane to maintain its blood supply.  The edges of the donor site were undermined.   The donor site was closed in a primary fashion.  The pedicle was then rotated into position and sutured.  Once the tube was sutured into place, adequate blood supply was confirmed with blanching and refill.  The pedicle was then wrapped with xeroform gauze and dressed appropriately with a telfa and gauze bandage to ensure continued blood supply and protect the attached pedicle.
Mucosal Advancement Flap Text: Given the location of the defect, shape of the defect and the proximity to free margins a mucosal advancement flap was deemed most appropriate. Incisions were made with a 15 blade scalpel in the appropriate fashion along the cutaneous vermilion border and the mucosal lip. The remaining actinically damaged mucosal tissue was excised.  The mucosal advancement flap was then elevated to the gingival sulcus with care taken to preserve the neurovascular structures and advanced into the primary defect. Care was taken to ensure that precise realignment of the vermilion border was achieved.
Excision Method: Round
Complex Repair And A-T Advancement Flap Text: The defect edges were debeveled with a #15 scalpel blade.  The primary defect was closed partially with a complex linear closure.  Given the location of the remaining defect, shape of the defect and the proximity to free margins an A-T advancement flap was deemed most appropriate for complete closure of the defect.  Using a sterile surgical marker, an appropriate advancement flap was drawn incorporating the defect and placing the expected incisions within the relaxed skin tension lines where possible.    The area thus outlined was incised deep to adipose tissue with a #15 scalpel blade.  The skin margins were undermined to an appropriate distance in all directions utilizing iris scissors.
Alar Island Pedicle Flap Text: The defect edges were debeveled with a #15 scalpel blade.  Given the location of the defect, shape of the defect and the proximity to the alar rim an island pedicle advancement flap was deemed most appropriate.  Using a sterile surgical marker, an appropriate advancement flap was drawn incorporating the defect, outlining the appropriate donor tissue and placing the expected incisions within the nasal ala running parallel to the alar rim. The area thus outlined was incised with a #15 scalpel blade.  The skin margins were undermined minimally to an appropriate distance in all directions around the primary defect and laterally outward around the island pedicle utilizing iris scissors.  There was minimal undermining beneath the pedicle flap.
Deep Sutures: 4-0 PDO
Perilesional Excision Additional Text (Leave Blank If You Do Not Want): The margin was drawn around the clinically apparent lesion. Incisions were then made along these lines to the appropriate tissue plane and the lesion was extirpated.
Lip Wedge Excision Repair Text: Given the location of the defect and the proximity to free margins a full thickness wedge repair was deemed most appropriate.  Using a sterile surgical marker, the appropriate repair was drawn incorporating the defect and placing the expected incisions perpendicular to the vermilion border.  The vermilion border was also meticulously outlined to ensure appropriate reapproximation during the repair.  The area thus outlined was incised through and through with a #15 scalpel blade.  The muscularis and dermis were reaproximated with deep sutures following hemostasis. Care was taken to realign the vermilion border before proceeding with the superficial closure.  Once the vermilion was realigned the superfical and mucosal closure was finished.
Paramedian Forehead Flap Text: A decision was made to reconstruct the defect utilizing an interpolation axial flap and a staged reconstruction.  A telfa template was made of the defect.  This telfa template was then used to outline the paramedian forehead pedicle flap.  The donor area for the pedicle flap was then injected with anesthesia.  The flap was excised through the skin and subcutaneous tissue down to the layer of the underlying musculature.  The pedicle flap was carefully excised within this deep plane to maintain its blood supply.  The edges of the donor site were undermined.   The donor site was closed in a primary fashion.  The pedicle was then rotated into position and sutured.  Once the tube was sutured into place, adequate blood supply was confirmed with blanching and refill.  The pedicle was then wrapped with xeroform gauze and dressed appropriately with a telfa and gauze bandage to ensure continued blood supply and protect the attached pedicle.
Excisional Biopsy Additional Text (Leave Blank If You Do Not Want): The margin was drawn around the clinically apparent lesion. An elliptical shape was then drawn on the skin incorporating the lesion and margins.  Incisions were then made along these lines to the appropriate tissue plane and the lesion was extirpated.
Complex Repair And O-T Advancement Flap Text: The defect edges were debeveled with a #15 scalpel blade.  The primary defect was closed partially with a complex linear closure.  Given the location of the remaining defect, shape of the defect and the proximity to free margins an O-T advancement flap was deemed most appropriate for complete closure of the defect.  Using a sterile surgical marker, an appropriate advancement flap was drawn incorporating the defect and placing the expected incisions within the relaxed skin tension lines where possible.    The area thus outlined was incised deep to adipose tissue with a #15 scalpel blade.  The skin margins were undermined to an appropriate distance in all directions utilizing iris scissors.
Hatchet Flap Text: The defect edges were debeveled with a #15 scalpel blade.  Given the location of the defect, shape of the defect and the proximity to free margins a hatchet flap was deemed most appropriate.  Using a sterile surgical marker, an appropriate hatchet flap was drawn incorporating the defect and placing the expected incisions within the relaxed skin tension lines where possible.    The area thus outlined was incised deep to adipose tissue with a #15 scalpel blade.  The skin margins were undermined to an appropriate distance in all directions utilizing iris scissors.
Skin Substitute Units (Will Override Primary Defect Units If Greater Than 0): 0
Additional Anesthesia Volume In Cc: 6
Double Island Pedicle Flap Text: The defect edges were debeveled with a #15 scalpel blade.  Given the location of the defect, shape of the defect and the proximity to free margins a double island pedicle advancement flap was deemed most appropriate.  Using a sterile surgical marker, an appropriate advancement flap was drawn incorporating the defect, outlining the appropriate donor tissue and placing the expected incisions within the relaxed skin tension lines where possible.    The area thus outlined was incised deep to adipose tissue with a #15 scalpel blade.  The skin margins were undermined to an appropriate distance in all directions around the primary defect and laterally outward around the island pedicle utilizing iris scissors.  There was minimal undermining beneath the pedicle flap.
Ftsg Text: The defect edges were debeveled with a #15 scalpel blade.  Given the location of the defect, shape of the defect and the proximity to free margins a full thickness skin graft was deemed most appropriate.  Using a sterile surgical marker, the primary defect shape was transferred to the donor site. The area thus outlined was incised deep to adipose tissue with a #15 scalpel blade.  The harvested graft was then trimmed of adipose tissue until only dermis and epidermis was left.  The skin margins of the secondary defect were undermined to an appropriate distance in all directions utilizing iris scissors.  The secondary defect was closed with interrupted buried subcutaneous sutures.  The skin edges were then re-apposed with running  sutures.  The skin graft was then placed in the primary defect and oriented appropriately.
Complex Repair And Bilobe Flap Text: The defect edges were debeveled with a #15 scalpel blade.  The primary defect was closed partially with a complex linear closure.  Given the location of the remaining defect, shape of the defect and the proximity to free margins a bilobe flap was deemed most appropriate for complete closure of the defect.  Using a sterile surgical marker, an appropriate advancement flap was drawn incorporating the defect and placing the expected incisions within the relaxed skin tension lines where possible.    The area thus outlined was incised deep to adipose tissue with a #15 scalpel blade.  The skin margins were undermined to an appropriate distance in all directions utilizing iris scissors.
Spiral Flap Text: The defect edges were debeveled with a #15 scalpel blade.  Given the location of the defect, shape of the defect and the proximity to free margins a spiral flap was deemed most appropriate.  Using a sterile surgical marker, an appropriate rotation flap was drawn incorporating the defect and placing the expected incisions within the relaxed skin tension lines where possible. The area thus outlined was incised deep to adipose tissue with a #15 scalpel blade.  The skin margins were undermined to an appropriate distance in all directions utilizing iris scissors.
Rotation Flap Text: The defect edges were debeveled with a #15 scalpel blade.  Given the location of the defect, shape of the defect and the proximity to free margins a rotation flap was deemed most appropriate.  Using a sterile surgical marker, an appropriate rotation flap was drawn incorporating the defect and placing the expected incisions within the relaxed skin tension lines where possible.    The area thus outlined was incised deep to adipose tissue with a #15 scalpel blade.  The skin margins were undermined to an appropriate distance in all directions utilizing iris scissors.
Complex Repair And O-L Flap Text: The defect edges were debeveled with a #15 scalpel blade.  The primary defect was closed partially with a complex linear closure.  Given the location of the remaining defect, shape of the defect and the proximity to free margins an O-L flap was deemed most appropriate for complete closure of the defect.  Using a sterile surgical marker, an appropriate flap was drawn incorporating the defect and placing the expected incisions within the relaxed skin tension lines where possible.    The area thus outlined was incised deep to adipose tissue with a #15 scalpel blade.  The skin margins were undermined to an appropriate distance in all directions utilizing iris scissors.
Billing Type: Third-Party Bill
Anesthesia Volume In Cc: 5.1
Split-Thickness Skin Graft Text: The defect edges were debeveled with a #15 scalpel blade.  Given the location of the defect, shape of the defect and the proximity to free margins a split thickness skin graft was deemed most appropriate.  Using a sterile surgical marker, the primary defect shape was transferred to the donor site. The split thickness graft was then harvested.  The skin graft was then placed in the primary defect and oriented appropriately.
Repair Performed By Another Provider Text (Leave Blank If You Do Not Want): After the tissue was excised the defect was repaired by another provider.
Complex Repair And Melolabial Flap Text: The defect edges were debeveled with a #15 scalpel blade.  The primary defect was closed partially with a complex linear closure.  Given the location of the remaining defect, shape of the defect and the proximity to free margins a melolabial flap was deemed most appropriate for complete closure of the defect.  Using a sterile surgical marker, an appropriate advancement flap was drawn incorporating the defect and placing the expected incisions within the relaxed skin tension lines where possible.    The area thus outlined was incised deep to adipose tissue with a #15 scalpel blade.  The skin margins were undermined to an appropriate distance in all directions utilizing iris scissors.
Star Wedge Flap Text: The defect edges were debeveled with a #15 scalpel blade.  Given the location of the defect, shape of the defect and the proximity to free margins a star wedge flap was deemed most appropriate.  Using a sterile surgical marker, an appropriate rotation flap was drawn incorporating the defect and placing the expected incisions within the relaxed skin tension lines where possible. The area thus outlined was incised deep to adipose tissue with a #15 scalpel blade.  The skin margins were undermined to an appropriate distance in all directions utilizing iris scissors.
Pre-Excision Curettage Text (Leave Blank If You Do Not Want): Prior to drawing the surgical margin the visible lesion was removed with electrodesiccation and curettage to clearly define the lesion size.
Hemostasis: Electrocautery
Positioning (Leave Blank If You Do Not Want): The patient was placed in a comfortable position exposing the surgical site.
Repair Type: None
Island Pedicle Flap-Requiring Vessel Identification Text: The defect edges were debeveled with a #15 scalpel blade.  Given the location of the defect, shape of the defect and the proximity to free margins an island pedicle advancement flap was deemed most appropriate.  Using a sterile surgical marker, an appropriate advancement flap was drawn, based on the axial vessel mentioned above, incorporating the defect, outlining the appropriate donor tissue and placing the expected incisions within the relaxed skin tension lines where possible.    The area thus outlined was incised deep to adipose tissue with a #15 scalpel blade.  The skin margins were undermined to an appropriate distance in all directions around the primary defect and laterally outward around the island pedicle utilizing iris scissors.  There was minimal undermining beneath the pedicle flap.
Transposition Flap Text: The defect edges were debeveled with a #15 scalpel blade.  Given the location of the defect and the proximity to free margins a transposition flap was deemed most appropriate.  Using a sterile surgical marker, an appropriate transposition flap was drawn incorporating the defect.    The area thus outlined was incised deep to adipose tissue with a #15 scalpel blade.  The skin margins were undermined to an appropriate distance in all directions utilizing iris scissors.
Complex Repair And Rotation Flap Text: The defect edges were debeveled with a #15 scalpel blade.  The primary defect was closed partially with a complex linear closure.  Given the location of the remaining defect, shape of the defect and the proximity to free margins a rotation flap was deemed most appropriate for complete closure of the defect.  Using a sterile surgical marker, an appropriate advancement flap was drawn incorporating the defect and placing the expected incisions within the relaxed skin tension lines where possible.    The area thus outlined was incised deep to adipose tissue with a #15 scalpel blade.  The skin margins were undermined to an appropriate distance in all directions utilizing iris scissors.
Keystone Flap Text: The defect edges were debeveled with a #15 scalpel blade.  Given the location of the defect, shape of the defect a keystone flap was deemed most appropriate.  Using a sterile surgical marker, an appropriate keystone flap was drawn incorporating the defect, outlining the appropriate donor tissue and placing the expected incisions within the relaxed skin tension lines where possible. The area thus outlined was incised deep to adipose tissue with a #15 scalpel blade.  The skin margins were undermined to an appropriate distance in all directions around the primary defect and laterally outward around the flap utilizing iris scissors.
Complex Repair Preamble Text (Leave Blank If You Do Not Want): Extensive wide undermining was performed.
No Repair - Repaired With Adjacent Surgical Defect Text (Leave Blank If You Do Not Want): After the excision the defect was repaired concurrently with another surgical defect which was in close approximation.
Cartilage Graft Text: The defect edges were debeveled with a #15 scalpel blade.  Given the location of the defect, shape of the defect, the fact the defect involved a full thickness cartilage defect a cartilage graft was deemed most appropriate.  An appropriate donor site was identified, cleansed, and anesthetized. The cartilage graft was then harvested and transferred to the recipient site, oriented appropriately and then sutured into place.  The secondary defect was then repaired using a primary closure.
Lab: [id number]
Intermediate Repair Preamble Text (Leave Blank If You Do Not Want): Undermining was performed with blunt dissection.
Estimated Blood Loss (Cc): minimal
Composite Graft Text: The defect edges were debeveled with a #15 scalpel blade.  Given the location of the defect, shape of the defect, the proximity to free margins and the fact the defect was full thickness a composite graft was deemed most appropriate.  The defect was outline and then transferred to the donor site.  A full thickness graft was then excised from the donor site. The graft was then placed in the primary defect, oriented appropriately and then sutured into place.  The secondary defect was then repaired using a primary closure.
Advancement Flap (Single) Text: The defect edges were debeveled with a #15 scalpel blade.  Given the location of the defect and the proximity to free margins a single advancement flap was deemed most appropriate.  Using a sterile surgical marker, an appropriate advancement flap was drawn incorporating the defect and placing the expected incisions within the relaxed skin tension lines where possible.    The area thus outlined was incised deep to adipose tissue with a #15 scalpel blade.  The skin margins were undermined to an appropriate distance in all directions utilizing iris scissors.
Complex Repair And Rhombic Flap Text: The defect edges were debeveled with a #15 scalpel blade.  The primary defect was closed partially with a complex linear closure.  Given the location of the remaining defect, shape of the defect and the proximity to free margins a rhombic flap was deemed most appropriate for complete closure of the defect.  Using a sterile surgical marker, an appropriate advancement flap was drawn incorporating the defect and placing the expected incisions within the relaxed skin tension lines where possible.    The area thus outlined was incised deep to adipose tissue with a #15 scalpel blade.  The skin margins were undermined to an appropriate distance in all directions utilizing iris scissors.
Muscle Hinge Flap Text: The defect edges were debeveled with a #15 scalpel blade.  Given the size, depth and location of the defect and the proximity to free margins a muscle hinge flap was deemed most appropriate.  Using a sterile surgical marker, an appropriate hinge flap was drawn incorporating the defect. The area thus outlined was incised with a #15 scalpel blade.  The skin margins were undermined to an appropriate distance in all directions utilizing iris scissors.
O-T Plasty Text: The defect edges were debeveled with a #15 scalpel blade.  Given the location of the defect, shape of the defect and the proximity to free margins an O-T plasty was deemed most appropriate.  Using a sterile surgical marker, an appropriate O-T plasty was drawn incorporating the defect and placing the expected incisions within the relaxed skin tension lines where possible.    The area thus outlined was incised deep to adipose tissue with a #15 scalpel blade.  The skin margins were undermined to an appropriate distance in all directions utilizing iris scissors.
Burow's Advancement Flap Text: The defect edges were debeveled with a #15 scalpel blade.  Given the location of the defect and the proximity to free margins a Burow's advancement flap was deemed most appropriate.  Using a sterile surgical marker, the appropriate advancement flap was drawn incorporating the defect and placing the expected incisions within the relaxed skin tension lines where possible.    The area thus outlined was incised deep to adipose tissue with a #15 scalpel blade.  The skin margins were undermined to an appropriate distance in all directions utilizing iris scissors.
Dermal Autograft Text: The defect edges were debeveled with a #15 scalpel blade.  Given the location of the defect, shape of the defect and the proximity to free margins a dermal autograft was deemed most appropriate.  Using a sterile surgical marker, the primary defect shape was transferred to the donor site. The area thus outlined was incised deep to adipose tissue with a #15 scalpel blade.  The harvested graft was then trimmed of adipose and epidermal tissue until only dermis was left.  The skin graft was then placed in the primary defect and oriented appropriately.
Path Notes (To The Dermatopathologist): Please check margins.
Advancement Flap (Double) Text: The defect edges were debeveled with a #15 scalpel blade.  Given the location of the defect and the proximity to free margins a double advancement flap was deemed most appropriate.  Using a sterile surgical marker, the appropriate advancement flaps were drawn incorporating the defect and placing the expected incisions within the relaxed skin tension lines where possible.    The area thus outlined was incised deep to adipose tissue with a #15 scalpel blade.  The skin margins were undermined to an appropriate distance in all directions utilizing iris scissors.
Epidermal Autograft Text: The defect edges were debeveled with a #15 scalpel blade.  Given the location of the defect, shape of the defect and the proximity to free margins an epidermal autograft was deemed most appropriate.  Using a sterile surgical marker, the primary defect shape was transferred to the donor site. The epidermal graft was then harvested.  The skin graft was then placed in the primary defect and oriented appropriately.
Complex Repair And Transposition Flap Text: The defect edges were debeveled with a #15 scalpel blade.  The primary defect was closed partially with a complex linear closure.  Given the location of the remaining defect, shape of the defect and the proximity to free margins a transposition flap was deemed most appropriate for complete closure of the defect.  Using a sterile surgical marker, an appropriate advancement flap was drawn incorporating the defect and placing the expected incisions within the relaxed skin tension lines where possible.    The area thus outlined was incised deep to adipose tissue with a #15 scalpel blade.  The skin margins were undermined to an appropriate distance in all directions utilizing iris scissors.
Melolabial Transposition Flap Text: The defect edges were debeveled with a #15 scalpel blade.  Given the location of the defect and the proximity to free margins a melolabial flap was deemed most appropriate.  Using a sterile surgical marker, an appropriate melolabial transposition flap was drawn incorporating the defect.    The area thus outlined was incised deep to adipose tissue with a #15 scalpel blade.  The skin margins were undermined to an appropriate distance in all directions utilizing iris scissors.
O-Z Plasty Text: The defect edges were debeveled with a #15 scalpel blade.  Given the location of the defect, shape of the defect and the proximity to free margins an O-Z plasty (double transposition flap) was deemed most appropriate.  Using a sterile surgical marker, the appropriate transposition flaps were drawn incorporating the defect and placing the expected incisions within the relaxed skin tension lines where possible.    The area thus outlined was incised deep to adipose tissue with a #15 scalpel blade.  The skin margins were undermined to an appropriate distance in all directions utilizing iris scissors.  Hemostasis was achieved with electrocautery.  The flaps were then transposed into place, one clockwise and the other counterclockwise, and anchored with interrupted buried subcutaneous sutures.
Lab Facility: 
Skin Substitute Text: The defect edges were debeveled with a #15 scalpel blade.  Given the location of the defect, shape of the defect and the proximity to free margins a skin substitute graft was deemed most appropriate.  The graft material was trimmed to fit the size of the defect. The graft was then placed in the primary defect and oriented appropriately.
Crescentic Advancement Flap Text: The defect edges were debeveled with a #15 scalpel blade.  Given the location of the defect and the proximity to free margins a crescentic advancement flap was deemed most appropriate.  Using a sterile surgical marker, the appropriate advancement flap was drawn incorporating the defect and placing the expected incisions within the relaxed skin tension lines where possible.    The area thus outlined was incised deep to adipose tissue with a #15 scalpel blade.  The skin margins were undermined to an appropriate distance in all directions utilizing iris scissors.
Complex Repair And M Plasty Text: The defect edges were debeveled with a #15 scalpel blade.  The primary defect was closed partially with a complex linear closure.  Given the location of the remaining defect, shape of the defect and the proximity to free margins an M plasty was deemed most appropriate for complete closure of the defect.  Using a sterile surgical marker, an appropriate advancement flap was drawn incorporating the defect and placing the expected incisions within the relaxed skin tension lines where possible.    The area thus outlined was incised deep to adipose tissue with a #15 scalpel blade.  The skin margins were undermined to an appropriate distance in all directions utilizing iris scissors.
Rhomboid Transposition Flap Text: The defect edges were debeveled with a #15 scalpel blade.  Given the location of the defect and the proximity to free margins a rhomboid transposition flap was deemed most appropriate.  Using a sterile surgical marker, an appropriate rhomboid flap was drawn incorporating the defect.    The area thus outlined was incised deep to adipose tissue with a #15 scalpel blade.  The skin margins were undermined to an appropriate distance in all directions utilizing iris scissors.
S Plasty Text: Given the location and shape of the defect, and the orientation of relaxed skin tension lines, an S-plasty was deemed most appropriate for repair.  Using a sterile surgical marker, the appropriate outline of the S-plasty was drawn, incorporating the defect and placing the expected incisions within the relaxed skin tension lines where possible.  The area thus outlined was incised deep to adipose tissue with a #15 scalpel blade.  The skin margins were undermined to an appropriate distance in all directions utilizing iris scissors. The skin flaps were advanced over the defect.  The opposing margins were then approximated with interrupted buried subcutaneous sutures.
Rhombic Flap Text: The defect edges were debeveled with a #15 scalpel blade.  Given the location of the defect and the proximity to free margins a rhombic flap was deemed most appropriate.  Using a sterile surgical marker, an appropriate rhombic flap was drawn incorporating the defect.    The area thus outlined was incised deep to adipose tissue with a #15 scalpel blade.  The skin margins were undermined to an appropriate distance in all directions utilizing iris scissors.
Complex Repair And V-Y Plasty Text: The defect edges were debeveled with a #15 scalpel blade.  The primary defect was closed partially with a complex linear closure.  Given the location of the remaining defect, shape of the defect and the proximity to free margins a V-Y plasty was deemed most appropriate for complete closure of the defect.  Using a sterile surgical marker, an appropriate advancement flap was drawn incorporating the defect and placing the expected incisions within the relaxed skin tension lines where possible.    The area thus outlined was incised deep to adipose tissue with a #15 scalpel blade.  The skin margins were undermined to an appropriate distance in all directions utilizing iris scissors.
Excision Depth: adipose tissue
Double O-Z Plasty Text: The defect edges were debeveled with a #15 scalpel blade.  Given the location of the defect, shape of the defect and the proximity to free margins a Double O-Z plasty (double transposition flap) was deemed most appropriate.  Using a sterile surgical marker, the appropriate transposition flaps were drawn incorporating the defect and placing the expected incisions within the relaxed skin tension lines where possible. The area thus outlined was incised deep to adipose tissue with a #15 scalpel blade.  The skin margins were undermined to an appropriate distance in all directions utilizing iris scissors.  Hemostasis was achieved with electrocautery.  The flaps were then transposed into place, one clockwise and the other counterclockwise, and anchored with interrupted buried subcutaneous sutures.
Graft Donor Site Bandage (Optional-Leave Blank If You Don't Want In Note): Steri-strips and a pressure bandage were applied to the donor site.
A-T Advancement Flap Text: The defect edges were debeveled with a #15 scalpel blade.  Given the location of the defect, shape of the defect and the proximity to free margins an A-T advancement flap was deemed most appropriate.  Using a sterile surgical marker, an appropriate advancement flap was drawn incorporating the defect and placing the expected incisions within the relaxed skin tension lines where possible.    The area thus outlined was incised deep to adipose tissue with a #15 scalpel blade.  The skin margins were undermined to an appropriate distance in all directions utilizing iris scissors.
Scalpel Size: 15 blade
Consent was obtained from the patient. The risks and benefits to therapy were discussed in detail. Specifically, the risks of infection, scarring, bleeding, prolonged wound healing, incomplete removal, allergy to anesthesia, nerve injury and recurrence were addressed. Prior to the procedure, the treatment site was clearly identified and confirmed by the patient. All components of Universal Protocol/PAUSE Rule completed.
Tissue Cultured Epidermal Autograft Text: The defect edges were debeveled with a #15 scalpel blade.  Given the location of the defect, shape of the defect and the proximity to free margins a tissue cultured epidermal autograft was deemed most appropriate.  The graft was then trimmed to fit the size of the defect.  The graft was then placed in the primary defect and oriented appropriately.
Bi-Rhombic Flap Text: The defect edges were debeveled with a #15 scalpel blade.  Given the location of the defect and the proximity to free margins a bi-rhombic flap was deemed most appropriate.  Using a sterile surgical marker, an appropriate rhombic flap was drawn incorporating the defect. The area thus outlined was incised deep to adipose tissue with a #15 scalpel blade.  The skin margins were undermined to an appropriate distance in all directions utilizing iris scissors.
Complex Repair And Double M Plasty Text: The defect edges were debeveled with a #15 scalpel blade.  The primary defect was closed partially with a complex linear closure.  Given the location of the remaining defect, shape of the defect and the proximity to free margins a double M plasty was deemed most appropriate for complete closure of the defect.  Using a sterile surgical marker, an appropriate advancement flap was drawn incorporating the defect and placing the expected incisions within the relaxed skin tension lines where possible.    The area thus outlined was incised deep to adipose tissue with a #15 scalpel blade.  The skin margins were undermined to an appropriate distance in all directions utilizing iris scissors.
V-Y Plasty Text: The defect edges were debeveled with a #15 scalpel blade.  Given the location of the defect, shape of the defect and the proximity to free margins an V-Y advancement flap was deemed most appropriate.  Using a sterile surgical marker, an appropriate advancement flap was drawn incorporating the defect and placing the expected incisions within the relaxed skin tension lines where possible.    The area thus outlined was incised deep to adipose tissue with a #15 scalpel blade.  The skin margins were undermined to an appropriate distance in all directions utilizing iris scissors.
Detail Level: Detailed
Helical Rim Advancement Flap Text: The defect edges were debeveled with a #15 blade scalpel.  Given the location of the defect and the proximity to free margins (helical rim) a double helical rim advancement flap was deemed most appropriate.  Using a sterile surgical marker, the appropriate advancement flaps were drawn incorporating the defect and placing the expected incisions between the helical rim and antihelix where possible.  The area thus outlined was incised through and through with a #15 scalpel blade.  With a skin hook and iris scissors, the flaps were gently and sharply undermined and freed up.
H Plasty Text: Given the location of the defect, shape of the defect and the proximity to free margins a H-plasty was deemed most appropriate for repair.  Using a sterile surgical marker, the appropriate advancement arms of the H-plasty were drawn incorporating the defect and placing the expected incisions within the relaxed skin tension lines where possible. The area thus outlined was incised deep to adipose tissue with a #15 scalpel blade. The skin margins were undermined to an appropriate distance in all directions utilizing iris scissors.  The opposing advancement arms were then advanced into place in opposite direction and anchored with interrupted buried subcutaneous sutures.
O-T Advancement Flap Text: The defect edges were debeveled with a #15 scalpel blade.  Given the location of the defect, shape of the defect and the proximity to free margins an O-T advancement flap was deemed most appropriate.  Using a sterile surgical marker, an appropriate advancement flap was drawn incorporating the defect and placing the expected incisions within the relaxed skin tension lines where possible.    The area thus outlined was incised deep to adipose tissue with a #15 scalpel blade.  The skin margins were undermined to an appropriate distance in all directions utilizing iris scissors.
Xenograft Text: The defect edges were debeveled with a #15 scalpel blade.  Given the location of the defect, shape of the defect and the proximity to free margins a xenograft was deemed most appropriate.  The graft was then trimmed to fit the size of the defect.  The graft was then placed in the primary defect and oriented appropriately.
Complex Repair And W Plasty Text: The defect edges were debeveled with a #15 scalpel blade.  The primary defect was closed partially with a complex linear closure.  Given the location of the remaining defect, shape of the defect and the proximity to free margins a W plasty was deemed most appropriate for complete closure of the defect.  Using a sterile surgical marker, an appropriate advancement flap was drawn incorporating the defect and placing the expected incisions within the relaxed skin tension lines where possible.    The area thus outlined was incised deep to adipose tissue with a #15 scalpel blade.  The skin margins were undermined to an appropriate distance in all directions utilizing iris scissors.
O-L Flap Text: The defect edges were debeveled with a #15 scalpel blade.  Given the location of the defect, shape of the defect and the proximity to free margins an O-L flap was deemed most appropriate.  Using a sterile surgical marker, an appropriate advancement flap was drawn incorporating the defect and placing the expected incisions within the relaxed skin tension lines where possible.    The area thus outlined was incised deep to adipose tissue with a #15 scalpel blade.  The skin margins were undermined to an appropriate distance in all directions utilizing iris scissors.
Previous Accession (Optional): [id number]
Primary Defect Length (In Cm): 1.2
Purse String (Intermediate) Text: Given the location of the defect and the characteristics of the surrounding skin a purse string intermediate closure was deemed most appropriate.  Undermining was performed circumfirentially around the surgical defect.  A purse string suture was then placed and tightened.
Complex Repair And Z Plasty Text: The defect edges were debeveled with a #15 scalpel blade.  The primary defect was closed partially with a complex linear closure.  Given the location of the remaining defect, shape of the defect and the proximity to free margins a Z plasty was deemed most appropriate for complete closure of the defect.  Using a sterile surgical marker, an appropriate advancement flap was drawn incorporating the defect and placing the expected incisions within the relaxed skin tension lines where possible.    The area thus outlined was incised deep to adipose tissue with a #15 scalpel blade.  The skin margins were undermined to an appropriate distance in all directions utilizing iris scissors.
Bilateral Helical Rim Advancement Flap Text: The defect edges were debeveled with a #15 blade scalpel.  Given the location of the defect and the proximity to free margins (helical rim) a bilateral helical rim advancement flap was deemed most appropriate.  Using a sterile surgical marker, the appropriate advancement flaps were drawn incorporating the defect and placing the expected incisions between the helical rim and antihelix where possible.  The area thus outlined was incised through and through with a #15 scalpel blade.  With a skin hook and iris scissors, the flaps were gently and sharply undermined and freed up.
W Plasty Text: The lesion was extirpated to the level of the fat with a #15 scalpel blade.  Given the location of the defect, shape of the defect and the proximity to free margins a W-plasty was deemed most appropriate for repair.  Using a sterile surgical marker, the appropriate transposition arms of the W-plasty were drawn incorporating the defect and placing the expected incisions within the relaxed skin tension lines where possible.    The area thus outlined was incised deep to adipose tissue with a #15 scalpel blade.  The skin margins were undermined to an appropriate distance in all directions utilizing iris scissors.  The opposing transposition arms were then transposed into place in opposite direction and anchored with interrupted buried subcutaneous sutures.
Partial Purse String (Intermediate) Text: Given the location of the defect and the characteristics of the surrounding skin an intermediate purse string closure was deemed most appropriate.  Undermining was performed circumferentially around the surgical defect.  A purse string suture was then placed and tightened. Wound tension of the circular defect prevented complete closure of the wound.
Complex Repair And Ftsg Text: The defect edges were debeveled with a #15 scalpel blade.  The primary defect was closed partially with a complex linear closure.  Given the location of the defect, shape of the defect and the proximity to free margins a full thickness skin graft was deemed most appropriate to repair the remaining defect.  The graft was trimmed to fit the size of the remaining defect.  The graft was then placed in the primary defect, oriented appropriately, and sutured into place.
Banner Transposition Flap Text: The defect edges were debeveled with a #15 scalpel blade.  Given the location of the defect and the proximity to free margins a Banner transposition flap was deemed most appropriate.  Using a sterile surgical marker, an appropriate flap drawn around the defect. The area thus outlined was incised deep to adipose tissue with a #15 scalpel blade.  The skin margins were undermined to an appropriate distance in all directions utilizing iris scissors.
Home Suture Removal Text: Patient was provided a home suture removal kit and will remove their sutures at home.  If they have any questions or difficulties they will call the office.
Purse String (Simple) Text: Given the location of the defect and the characteristics of the surrounding skin a purse string simple closure was deemed most appropriate.  Undermining was performed circumferentially around the surgical defect.  A purse string suture was then placed and tightened.
Post-Care Instructions: I reviewed with the patient in detail post-care instructions. Patient is not to engage in any heavy lifting, exercise, or swimming for the next 14 days. Should the patient develop any fevers, chills, bleeding, severe pain patient will contact the office immediately.
Ear Star Wedge Flap Text: The defect edges were debeveled with a #15 blade scalpel.  Given the location of the defect and the proximity to free margins (helical rim) an ear star wedge flap was deemed most appropriate.  Using a sterile surgical marker, the appropriate flap was drawn incorporating the defect and placing the expected incisions between the helical rim and antihelix where possible.  The area thus outlined was incised through and through with a #15 scalpel blade.
Additional Epidermal Closure (Optional): simple interrupted
Complex Repair And Dorsal Nasal Flap Text: The defect edges were debeveled with a #15 scalpel blade.  The primary defect was closed partially with a complex linear closure.  Given the location of the remaining defect, shape of the defect and the proximity to free margins a dorsal nasal flap was deemed most appropriate for complete closure of the defect.  Using a sterile surgical marker, an appropriate flap was drawn incorporating the defect and placing the expected incisions within the relaxed skin tension lines where possible.    The area thus outlined was incised deep to adipose tissue with a #15 scalpel blade.  The skin margins were undermined to an appropriate distance in all directions utilizing iris scissors.
Z Plasty Text: The lesion was extirpated to the level of the fat with a #15 scalpel blade.  Given the location of the defect, shape of the defect and the proximity to free margins a Z-plasty was deemed most appropriate for repair.  Using a sterile surgical marker, the appropriate transposition arms of the Z-plasty were drawn incorporating the defect and placing the expected incisions within the relaxed skin tension lines where possible.    The area thus outlined was incised deep to adipose tissue with a #15 scalpel blade.  The skin margins were undermined to an appropriate distance in all directions utilizing iris scissors.  The opposing transposition arms were then transposed into place in opposite direction and anchored with interrupted buried subcutaneous sutures.
O-Z Flap Text: The defect edges were debeveled with a #15 scalpel blade.  Given the location of the defect, shape of the defect and the proximity to free margins an O-Z flap was deemed most appropriate.  Using a sterile surgical marker, an appropriate transposition flap was drawn incorporating the defect and placing the expected incisions within the relaxed skin tension lines where possible. The area thus outlined was incised deep to adipose tissue with a #15 scalpel blade.  The skin margins were undermined to an appropriate distance in all directions utilizing iris scissors.
Partial Purse String (Simple) Text: Given the location of the defect and the characteristics of the surrounding skin a simple purse string closure was deemed most appropriate.  Undermining was performed circumferentially around the surgical defect.  A purse string suture was then placed and tightened. Wound tension of the circular defect prevented complete closure of the wound.
Complex Repair And Split-Thickness Skin Graft Text: The defect edges were debeveled with a #15 scalpel blade.  The primary defect was closed partially with a complex linear closure.  Given the location of the defect, shape of the defect and the proximity to free margins a split thickness skin graft was deemed most appropriate to repair the remaining defect.  The graft was trimmed to fit the size of the remaining defect.  The graft was then placed in the primary defect, oriented appropriately, and sutured into place.
Bilobed Flap Text: The defect edges were debeveled with a #15 scalpel blade.  Given the location of the defect and the proximity to free margins a bilobe flap was deemed most appropriate.  Using a sterile surgical marker, an appropriate bilobe flap drawn around the defect.    The area thus outlined was incised deep to adipose tissue with a #15 scalpel blade.  The skin margins were undermined to an appropriate distance in all directions utilizing iris scissors.
Cheek-To-Nose Interpolation Flap Text: A decision was made to reconstruct the defect utilizing an interpolation axial flap and a staged reconstruction.  A telfa template was made of the defect.  This telfa template was then used to outline the Cheek-To-Nose Interpolation flap.  The donor area for the pedicle flap was then injected with anesthesia.  The flap was excised through the skin and subcutaneous tissue down to the layer of the underlying musculature.  The interpolation flap was carefully excised within this deep plane to maintain its blood supply.  The edges of the donor site were undermined.   The donor site was closed in a primary fashion.  The pedicle was then rotated into position and sutured.  Once the tube was sutured into place, adequate blood supply was confirmed with blanching and refill.  The pedicle was then wrapped with xeroform gauze and dressed appropriately with a telfa and gauze bandage to ensure continued blood supply and protect the attached pedicle.
Curvilinear Excision Additional Text (Leave Blank If You Do Not Want): The margin was drawn around the clinically apparent lesion.  A curvilinear shape was then drawn on the skin incorporating the lesion and margins.  Incisions were then made along these lines to the appropriate tissue plane and the lesion was extirpated.
Cheek Interpolation Flap Text: A decision was made to reconstruct the defect utilizing an interpolation axial flap and a staged reconstruction.  A telfa template was made of the defect.  This telfa template was then used to outline the Cheek Interpolation flap.  The donor area for the pedicle flap was then injected with anesthesia.  The flap was excised through the skin and subcutaneous tissue down to the layer of the underlying musculature.  The interpolation flap was carefully excised within this deep plane to maintain its blood supply.  The edges of the donor site were undermined.   The donor site was closed in a primary fashion.  The pedicle was then rotated into position and sutured.  Once the tube was sutured into place, adequate blood supply was confirmed with blanching and refill.  The pedicle was then wrapped with xeroform gauze and dressed appropriately with a telfa and gauze bandage to ensure continued blood supply and protect the attached pedicle.
Double O-Z Flap Text: The defect edges were debeveled with a #15 scalpel blade.  Given the location of the defect, shape of the defect and the proximity to free margins a Double O-Z flap was deemed most appropriate.  Using a sterile surgical marker, an appropriate transposition flap was drawn incorporating the defect and placing the expected incisions within the relaxed skin tension lines where possible. The area thus outlined was incised deep to adipose tissue with a #15 scalpel blade.  The skin margins were undermined to an appropriate distance in all directions utilizing iris scissors.
Anesthesia Type: 0.5% lidocaine with 1:200,000 epinephrine and a 1:10 solution of 8.4% sodium bicarbonate and 408mcg clindamycin/ml
Complex Repair And Single Advancement Flap Text: The defect edges were debeveled with a #15 scalpel blade.  The primary defect was closed partially with a complex linear closure.  Given the location of the remaining defect, shape of the defect and the proximity to free margins a single advancement flap was deemed most appropriate for complete closure of the defect.  Using a sterile surgical marker, an appropriate advancement flap was drawn incorporating the defect and placing the expected incisions within the relaxed skin tension lines where possible.    The area thus outlined was incised deep to adipose tissue with a #15 scalpel blade.  The skin margins were undermined to an appropriate distance in all directions utilizing iris scissors.
Bilobed Transposition Flap Text: The defect edges were debeveled with a #15 scalpel blade.  Given the location of the defect and the proximity to free margins a bilobed transposition flap was deemed most appropriate.  Using a sterile surgical marker, an appropriate bilobe flap drawn around the defect.    The area thus outlined was incised deep to adipose tissue with a #15 scalpel blade.  The skin margins were undermined to an appropriate distance in all directions utilizing iris scissors.
Epidermal Closure: running subcuticular
Complex Repair And Epidermal Autograft Text: The defect edges were debeveled with a #15 scalpel blade.  The primary defect was closed partially with a complex linear closure.  Given the location of the defect, shape of the defect and the proximity to free margins an epidermal autograft was deemed most appropriate to repair the remaining defect.  The graft was trimmed to fit the size of the remaining defect.  The graft was then placed in the primary defect, oriented appropriately, and sutured into place.
Fusiform Excision Additional Text (Leave Blank If You Do Not Want): The margin was drawn around the clinically apparent lesion.  A fusiform shape was then drawn on the skin incorporating the lesion and margins.  Incisions were then made along these lines to the appropriate tissue plane and the lesion was extirpated.
Interpolation Flap Text: A decision was made to reconstruct the defect utilizing an interpolation axial flap and a staged reconstruction.  A telfa template was made of the defect.  This telfa template was then used to outline the interpolation flap.  The donor area for the pedicle flap was then injected with anesthesia.  The flap was excised through the skin and subcutaneous tissue down to the layer of the underlying musculature.  The interpolation flap was carefully excised within this deep plane to maintain its blood supply.  The edges of the donor site were undermined.   The donor site was closed in a primary fashion.  The pedicle was then rotated into position and sutured.  Once the tube was sutured into place, adequate blood supply was confirmed with blanching and refill.  The pedicle was then wrapped with xeroform gauze and dressed appropriately with a telfa and gauze bandage to ensure continued blood supply and protect the attached pedicle.
Advancement-Rotation Flap Text: The defect edges were debeveled with a #15 scalpel blade.  Given the location of the defect, shape of the defect and the proximity to free margins an advancement-rotation flap was deemed most appropriate.  Using a sterile surgical marker, an appropriate flap was drawn incorporating the defect and placing the expected incisions within the relaxed skin tension lines where possible. The area thus outlined was incised deep to adipose tissue with a #15 scalpel blade.  The skin margins were undermined to an appropriate distance in all directions utilizing iris scissors.
Anesthesia Type: 1% lidocaine with epinephrine
Size Of Lesion In Cm: 0.6
V-Y Flap Text: The defect edges were debeveled with a #15 scalpel blade.  Given the location of the defect, shape of the defect and the proximity to free margins a V-Y flap was deemed most appropriate.  Using a sterile surgical marker, an appropriate advancement flap was drawn incorporating the defect and placing the expected incisions within the relaxed skin tension lines where possible.    The area thus outlined was incised deep to adipose tissue with a #15 scalpel blade.  The skin margins were undermined to an appropriate distance in all directions utilizing iris scissors.
Wound Care: Petrolatum
Melolabial Interpolation Flap Text: A decision was made to reconstruct the defect utilizing an interpolation axial flap and a staged reconstruction.  A telfa template was made of the defect.  This telfa template was then used to outline the melolabial interpolation flap.  The donor area for the pedicle flap was then injected with anesthesia.  The flap was excised through the skin and subcutaneous tissue down to the layer of the underlying musculature.  The pedicle flap was carefully excised within this deep plane to maintain its blood supply.  The edges of the donor site were undermined.   The donor site was closed in a primary fashion.  The pedicle was then rotated into position and sutured.  Once the tube was sutured into place, adequate blood supply was confirmed with blanching and refill.  The pedicle was then wrapped with xeroform gauze and dressed appropriately with a telfa and gauze bandage to ensure continued blood supply and protect the attached pedicle.
Elliptical Excision Additional Text (Leave Blank If You Do Not Want): The margin was drawn around the clinically apparent lesion.  An elliptical shape was then drawn on the skin incorporating the lesion and margins.  Incisions were then made along these lines to the appropriate tissue plane and the lesion was extirpated.
Mercedes Flap Text: The defect edges were debeveled with a #15 scalpel blade.  Given the location of the defect, shape of the defect and the proximity to free margins a Mercedes flap was deemed most appropriate.  Using a sterile surgical marker, an appropriate advancement flap was drawn incorporating the defect and placing the expected incisions within the relaxed skin tension lines where possible. The area thus outlined was incised deep to adipose tissue with a #15 scalpel blade.  The skin margins were undermined to an appropriate distance in all directions utilizing iris scissors.
Dressing: dry sterile dressing
Complex Repair And Double Advancement Flap Text: The defect edges were debeveled with a #15 scalpel blade.  The primary defect was closed partially with a complex linear closure.  Given the location of the remaining defect, shape of the defect and the proximity to free margins a double advancement flap was deemed most appropriate for complete closure of the defect.  Using a sterile surgical marker, an appropriate advancement flap was drawn incorporating the defect and placing the expected incisions within the relaxed skin tension lines where possible.    The area thus outlined was incised deep to adipose tissue with a #15 scalpel blade.  The skin margins were undermined to an appropriate distance in all directions utilizing iris scissors.
Complex Repair And Dermal Autograft Text: The defect edges were debeveled with a #15 scalpel blade.  The primary defect was closed partially with a complex linear closure.  Given the location of the defect, shape of the defect and the proximity to free margins an dermal autograft was deemed most appropriate to repair the remaining defect.  The graft was trimmed to fit the size of the remaining defect.  The graft was then placed in the primary defect, oriented appropriately, and sutured into place.
Trilobed Flap Text: The defect edges were debeveled with a #15 scalpel blade.  Given the location of the defect and the proximity to free margins a trilobed flap was deemed most appropriate.  Using a sterile surgical marker, an appropriate trilobed flap drawn around the defect.    The area thus outlined was incised deep to adipose tissue with a #15 scalpel blade.  The skin margins were undermined to an appropriate distance in all directions utilizing iris scissors.
Size Of Margin In Cm: 0.3
Modified Advancement Flap Text: The defect edges were debeveled with a #15 scalpel blade.  Given the location of the defect, shape of the defect and the proximity to free margins a modified advancement flap was deemed most appropriate.  Using a sterile surgical marker, an appropriate advancement flap was drawn incorporating the defect and placing the expected incisions within the relaxed skin tension lines where possible.    The area thus outlined was incised deep to adipose tissue with a #15 scalpel blade.  The skin margins were undermined to an appropriate distance in all directions utilizing iris scissors.
Information: Selecting Yes will display possible errors in your note based on the variables you have selected. This validation is only offered as a suggestion for you. PLEASE NOTE THAT THE VALIDATION TEXT WILL BE REMOVED WHEN YOU FINALIZE YOUR NOTE. IF YOU WANT TO FAX A PRELIMINARY NOTE YOU WILL NEED TO TOGGLE THIS TO 'NO' IF YOU DO NOT WANT IT IN YOUR FAXED NOTE.
Complex Repair And Modified Advancement Flap Text: The defect edges were debeveled with a #15 scalpel blade.  The primary defect was closed partially with a complex linear closure.  Given the location of the remaining defect, shape of the defect and the proximity to free margins a modified advancement flap was deemed most appropriate for complete closure of the defect.  Using a sterile surgical marker, an appropriate advancement flap was drawn incorporating the defect and placing the expected incisions within the relaxed skin tension lines where possible.    The area thus outlined was incised deep to adipose tissue with a #15 scalpel blade.  The skin margins were undermined to an appropriate distance in all directions utilizing iris scissors.
Anesthesia Volume In Cc: 7
Dorsal Nasal Flap Text: The defect edges were debeveled with a #15 scalpel blade.  Given the location of the defect and the proximity to free margins a dorsal nasal flap was deemed most appropriate.  Using a sterile surgical marker, an appropriate dorsal nasal flap was drawn around the defect.    The area thus outlined was incised deep to adipose tissue with a #15 scalpel blade.  The skin margins were undermined to an appropriate distance in all directions utilizing iris scissors.
Complex Repair And Tissue Cultured Epidermal Autograft Text: The defect edges were debeveled with a #15 scalpel blade.  The primary defect was closed partially with a complex linear closure.  Given the location of the defect, shape of the defect and the proximity to free margins an tissue cultured epidermal autograft was deemed most appropriate to repair the remaining defect.  The graft was trimmed to fit the size of the remaining defect.  The graft was then placed in the primary defect, oriented appropriately, and sutured into place.
Saucerization Excision Additional Text (Leave Blank If You Do Not Want): The margin was drawn around the clinically apparent lesion.  Incisions were then made along these lines, in a tangential fashion, to the appropriate tissue plane and the lesion was extirpated.
Mastoid Interpolation Flap Text: A decision was made to reconstruct the defect utilizing an interpolation axial flap and a staged reconstruction.  A telfa template was made of the defect.  This telfa template was then used to outline the mastoid interpolation flap.  The donor area for the pedicle flap was then injected with anesthesia.  The flap was excised through the skin and subcutaneous tissue down to the layer of the underlying musculature.  The pedicle flap was carefully excised within this deep plane to maintain its blood supply.  The edges of the donor site were undermined.   The donor site was closed in a primary fashion.  The pedicle was then rotated into position and sutured.  Once the tube was sutured into place, adequate blood supply was confirmed with blanching and refill.  The pedicle was then wrapped with xeroform gauze and dressed appropriately with a telfa and gauze bandage to ensure continued blood supply and protect the attached pedicle.
Size Of Lesion In Cm: 0.7
Additional Anesthesia Volume In Cc: 2
Anesthesia Volume In Cc: 4
Previous Accession (Optional): [id number]

## 2019-06-13 ENCOUNTER — OFFICE VISIT (OUTPATIENT)
Dept: MEDICAL GROUP | Facility: PHYSICIAN GROUP | Age: 84
End: 2019-06-13
Payer: MEDICARE

## 2019-06-13 VITALS
HEART RATE: 70 BPM | OXYGEN SATURATION: 94 % | BODY MASS INDEX: 21.52 KG/M2 | SYSTOLIC BLOOD PRESSURE: 122 MMHG | HEIGHT: 61 IN | WEIGHT: 114 LBS | TEMPERATURE: 97 F | DIASTOLIC BLOOD PRESSURE: 74 MMHG

## 2019-06-13 DIAGNOSIS — M85.80 OSTEOPENIA, UNSPECIFIED LOCATION: ICD-10-CM

## 2019-06-13 DIAGNOSIS — R25.2 LEG CRAMPS: ICD-10-CM

## 2019-06-13 DIAGNOSIS — N18.30 CKD (CHRONIC KIDNEY DISEASE) STAGE 3, GFR 30-59 ML/MIN (HCC): ICD-10-CM

## 2019-06-13 DIAGNOSIS — Z23 NEED FOR VACCINATION: ICD-10-CM

## 2019-06-13 PROCEDURE — 99202 OFFICE O/P NEW SF 15 MIN: CPT | Mod: 25 | Performed by: FAMILY MEDICINE

## 2019-06-13 PROCEDURE — 90732 PPSV23 VACC 2 YRS+ SUBQ/IM: CPT | Performed by: FAMILY MEDICINE

## 2019-06-13 PROCEDURE — G0009 ADMIN PNEUMOCOCCAL VACCINE: HCPCS | Performed by: FAMILY MEDICINE
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
## 2019-07-10 ENCOUNTER — HOSPITAL ENCOUNTER (OUTPATIENT)
Dept: RADIOLOGY | Facility: MEDICAL CENTER | Age: 84
End: 2019-07-10
Attending: INTERNAL MEDICINE
Payer: MEDICARE

## 2019-07-10 DIAGNOSIS — R91.1 PULMONARY NODULE: ICD-10-CM

## 2019-07-10 PROCEDURE — 71250 CT THORAX DX C-: CPT

## 2019-07-23 ENCOUNTER — APPOINTMENT (RX ONLY)
Dept: URBAN - METROPOLITAN AREA CLINIC 4 | Facility: CLINIC | Age: 84
Setting detail: DERMATOLOGY
End: 2019-07-23

## 2019-07-23 DIAGNOSIS — L72.8 OTHER FOLLICULAR CYSTS OF THE SKIN AND SUBCUTANEOUS TISSUE: ICD-10-CM

## 2019-07-23 DIAGNOSIS — Z85.828 PERSONAL HISTORY OF OTHER MALIGNANT NEOPLASM OF SKIN: ICD-10-CM

## 2019-07-23 PROCEDURE — 99212 OFFICE O/P EST SF 10 MIN: CPT

## 2019-07-23 PROCEDURE — ? COUNSELING

## 2019-07-23 ASSESSMENT — LOCATION DETAILED DESCRIPTION DERM
LOCATION DETAILED: LEFT DISTAL PRETIBIAL REGION
LOCATION DETAILED: LEFT DISTAL PALMAR INDEX FINGER
LOCATION DETAILED: LEFT PROXIMAL PRETIBIAL REGION

## 2019-07-23 ASSESSMENT — LOCATION SIMPLE DESCRIPTION DERM
LOCATION SIMPLE: LEFT PRETIBIAL REGION
LOCATION SIMPLE: LEFT INDEX FINGER

## 2019-07-23 ASSESSMENT — LOCATION ZONE DERM
LOCATION ZONE: FINGER
LOCATION ZONE: LEG

## 2019-10-04 ENCOUNTER — HOSPITAL ENCOUNTER (OUTPATIENT)
Dept: LAB | Facility: MEDICAL CENTER | Age: 84
End: 2019-10-04
Attending: INTERNAL MEDICINE
Payer: MEDICARE

## 2019-10-04 LAB
25(OH)D3 SERPL-MCNC: 41 NG/ML (ref 30–100)
ALBUMIN SERPL BCP-MCNC: 4.2 G/DL (ref 3.2–4.9)
ALBUMIN/GLOB SERPL: 1.6 G/DL
ALP SERPL-CCNC: 59 U/L (ref 30–99)
ALT SERPL-CCNC: 19 U/L (ref 2–50)
ANION GAP SERPL CALC-SCNC: 8 MMOL/L (ref 0–11.9)
APPEARANCE UR: CLEAR
AST SERPL-CCNC: 27 U/L (ref 12–45)
BACTERIA #/AREA URNS HPF: NEGATIVE /HPF
BASOPHILS # BLD AUTO: 0.7 % (ref 0–1.8)
BASOPHILS # BLD: 0.03 K/UL (ref 0–0.12)
BILIRUB SERPL-MCNC: 0.5 MG/DL (ref 0.1–1.5)
BILIRUB UR QL STRIP.AUTO: NEGATIVE
BUN SERPL-MCNC: 31 MG/DL (ref 8–22)
CALCIUM SERPL-MCNC: 9.7 MG/DL (ref 8.5–10.5)
CHLORIDE SERPL-SCNC: 105 MMOL/L (ref 96–112)
CO2 SERPL-SCNC: 24 MMOL/L (ref 20–33)
COLOR UR: YELLOW
CREAT SERPL-MCNC: 1.31 MG/DL (ref 0.5–1.4)
CREAT UR-MCNC: 51.3 MG/DL
EOSINOPHIL # BLD AUTO: 0.13 K/UL (ref 0–0.51)
EOSINOPHIL NFR BLD: 3.1 % (ref 0–6.9)
EPI CELLS #/AREA URNS HPF: NEGATIVE /HPF
ERYTHROCYTE [DISTWIDTH] IN BLOOD BY AUTOMATED COUNT: 42.9 FL (ref 35.9–50)
FERRITIN SERPL-MCNC: 18.6 NG/ML (ref 10–291)
GLOBULIN SER CALC-MCNC: 2.6 G/DL (ref 1.9–3.5)
GLUCOSE SERPL-MCNC: 90 MG/DL (ref 65–99)
GLUCOSE UR STRIP.AUTO-MCNC: NEGATIVE MG/DL
HCT VFR BLD AUTO: 39.3 % (ref 37–47)
HGB BLD-MCNC: 12.3 G/DL (ref 12–16)
HYALINE CASTS #/AREA URNS LPF: NORMAL /LPF
IMM GRANULOCYTES # BLD AUTO: 0.01 K/UL (ref 0–0.11)
IMM GRANULOCYTES NFR BLD AUTO: 0.2 % (ref 0–0.9)
IRON SATN MFR SERPL: 15 % (ref 15–55)
IRON SERPL-MCNC: 54 UG/DL (ref 40–170)
KETONES UR STRIP.AUTO-MCNC: NEGATIVE MG/DL
LEUKOCYTE ESTERASE UR QL STRIP.AUTO: NEGATIVE
LYMPHOCYTES # BLD AUTO: 0.81 K/UL (ref 1–4.8)
LYMPHOCYTES NFR BLD: 19.6 % (ref 22–41)
MAGNESIUM SERPL-MCNC: 2 MG/DL (ref 1.5–2.5)
MCH RBC QN AUTO: 27.3 PG (ref 27–33)
MCHC RBC AUTO-ENTMCNC: 31.3 G/DL (ref 33.6–35)
MCV RBC AUTO: 87.3 FL (ref 81.4–97.8)
MICRO URNS: ABNORMAL
MONOCYTES # BLD AUTO: 0.45 K/UL (ref 0–0.85)
MONOCYTES NFR BLD AUTO: 10.9 % (ref 0–13.4)
NEUTROPHILS # BLD AUTO: 2.7 K/UL (ref 2–7.15)
NEUTROPHILS NFR BLD: 65.5 % (ref 44–72)
NITRITE UR QL STRIP.AUTO: NEGATIVE
NRBC # BLD AUTO: 0 K/UL
NRBC BLD-RTO: 0 /100 WBC
PH UR STRIP.AUTO: 6 [PH] (ref 5–8)
PHOSPHATE SERPL-MCNC: 3.5 MG/DL (ref 2.5–4.5)
PLATELET # BLD AUTO: 210 K/UL (ref 164–446)
PMV BLD AUTO: 10.1 FL (ref 9–12.9)
POTASSIUM SERPL-SCNC: 4.4 MMOL/L (ref 3.6–5.5)
PROT SERPL-MCNC: 6.8 G/DL (ref 6–8.2)
PROT UR QL STRIP: 30 MG/DL
PROT UR-MCNC: 24.2 MG/DL (ref 0–15)
RBC # BLD AUTO: 4.5 M/UL (ref 4.2–5.4)
RBC # URNS HPF: NORMAL /HPF
RBC UR QL AUTO: NEGATIVE
SODIUM SERPL-SCNC: 137 MMOL/L (ref 135–145)
SP GR UR STRIP.AUTO: 1.01
TIBC SERPL-MCNC: 365 UG/DL (ref 250–450)
URATE SERPL-MCNC: 7 MG/DL (ref 1.9–8.2)
UROBILINOGEN UR STRIP.AUTO-MCNC: 0.2 MG/DL
WBC # BLD AUTO: 4.1 K/UL (ref 4.8–10.8)
WBC #/AREA URNS HPF: NORMAL /HPF

## 2019-10-04 PROCEDURE — 83735 ASSAY OF MAGNESIUM: CPT

## 2019-10-04 PROCEDURE — 36415 COLL VENOUS BLD VENIPUNCTURE: CPT

## 2019-10-04 PROCEDURE — 82306 VITAMIN D 25 HYDROXY: CPT

## 2019-10-04 PROCEDURE — 82728 ASSAY OF FERRITIN: CPT

## 2019-10-04 PROCEDURE — 81001 URINALYSIS AUTO W/SCOPE: CPT

## 2019-10-04 PROCEDURE — 85025 COMPLETE CBC W/AUTO DIFF WBC: CPT

## 2019-10-04 PROCEDURE — 84100 ASSAY OF PHOSPHORUS: CPT

## 2019-10-04 PROCEDURE — 84156 ASSAY OF PROTEIN URINE: CPT

## 2019-10-04 PROCEDURE — 82570 ASSAY OF URINE CREATININE: CPT

## 2019-10-04 PROCEDURE — 83550 IRON BINDING TEST: CPT

## 2019-10-04 PROCEDURE — 80053 COMPREHEN METABOLIC PANEL: CPT

## 2019-10-04 PROCEDURE — 84550 ASSAY OF BLOOD/URIC ACID: CPT

## 2019-10-04 PROCEDURE — 83540 ASSAY OF IRON: CPT

## 2019-10-23 ENCOUNTER — APPOINTMENT (RX ONLY)
Dept: URBAN - METROPOLITAN AREA CLINIC 4 | Facility: CLINIC | Age: 84
Setting detail: DERMATOLOGY
End: 2019-10-23

## 2019-10-23 DIAGNOSIS — D485 NEOPLASM OF UNCERTAIN BEHAVIOR OF SKIN: ICD-10-CM

## 2019-10-23 DIAGNOSIS — Z85.828 PERSONAL HISTORY OF OTHER MALIGNANT NEOPLASM OF SKIN: ICD-10-CM

## 2019-10-23 DIAGNOSIS — L57.0 ACTINIC KERATOSIS: ICD-10-CM

## 2019-10-23 DIAGNOSIS — L65.9 NONSCARRING HAIR LOSS, UNSPECIFIED: ICD-10-CM

## 2019-10-23 PROBLEM — D48.5 NEOPLASM OF UNCERTAIN BEHAVIOR OF SKIN: Status: ACTIVE | Noted: 2019-10-23

## 2019-10-23 PROCEDURE — 11102 TANGNTL BX SKIN SINGLE LES: CPT

## 2019-10-23 PROCEDURE — ? BIOPSY BY SHAVE METHOD

## 2019-10-23 PROCEDURE — 99213 OFFICE O/P EST LOW 20 MIN: CPT | Mod: 25

## 2019-10-23 PROCEDURE — ? LIQUID NITROGEN

## 2019-10-23 PROCEDURE — 17000 DESTRUCT PREMALG LESION: CPT | Mod: 59

## 2019-10-23 PROCEDURE — 17003 DESTRUCT PREMALG LES 2-14: CPT

## 2019-10-23 PROCEDURE — ? COUNSELING

## 2019-10-23 PROCEDURE — ? PRESCRIPTION

## 2019-10-23 RX ORDER — TRIAMCINOLONE ACETONIDE 1 MG/G
1 CREAM TOPICAL BID
Qty: 1 | Refills: 3 | Status: ERX | COMMUNITY
Start: 2019-10-23

## 2019-10-23 RX ADMIN — TRIAMCINOLONE ACETONIDE 1: 1 CREAM TOPICAL at 00:00

## 2019-10-23 ASSESSMENT — LOCATION SIMPLE DESCRIPTION DERM
LOCATION SIMPLE: LEFT ANTERIOR NECK
LOCATION SIMPLE: LEFT PRETIBIAL REGION
LOCATION SIMPLE: LEFT FOREHEAD
LOCATION SIMPLE: SCALP
LOCATION SIMPLE: RIGHT FOREHEAD
LOCATION SIMPLE: RIGHT NOSE
LOCATION SIMPLE: RIGHT CHEEK

## 2019-10-23 ASSESSMENT — LOCATION ZONE DERM
LOCATION ZONE: LEG
LOCATION ZONE: FACE
LOCATION ZONE: NOSE
LOCATION ZONE: NECK
LOCATION ZONE: SCALP

## 2019-10-23 ASSESSMENT — LOCATION DETAILED DESCRIPTION DERM
LOCATION DETAILED: RIGHT SUPERIOR LATERAL MALAR CHEEK
LOCATION DETAILED: LEFT INFERIOR FOREHEAD
LOCATION DETAILED: RIGHT NASAL SIDEWALL
LOCATION DETAILED: LEFT SUPERIOR PARIETAL SCALP
LOCATION DETAILED: RIGHT CENTRAL MALAR CHEEK
LOCATION DETAILED: RIGHT LATERAL MALAR CHEEK
LOCATION DETAILED: LEFT PROXIMAL PRETIBIAL REGION
LOCATION DETAILED: LEFT DISTAL PRETIBIAL REGION
LOCATION DETAILED: LEFT CLAVICULAR NECK
LOCATION DETAILED: RIGHT FOREHEAD
LOCATION DETAILED: RIGHT INFERIOR CENTRAL MALAR CHEEK

## 2019-10-23 NOTE — PROCEDURE: LIQUID NITROGEN
Consent: The patient's consent was obtained including but not limited to risks of crusting, scabbing, blistering, scarring, darker or lighter pigmentary change, recurrence, incomplete removal and infection.
Post-Care Instructions: I reviewed with the patient in detail post-care instructions. Patient is to wear sunprotection, and avoid picking at any of the treated lesions. Pt may apply Vaseline to crusted or scabbing areas.
Detail Level: Simple
Duration Of Freeze Thaw-Cycle (Seconds): 3
Render Post-Care Instructions In Note?: no
Number Of Freeze-Thaw Cycles: 2 freeze-thaw cycles

## 2019-10-23 NOTE — PROCEDURE: BIOPSY BY SHAVE METHOD
Render Post-Care Instructions In Note?: no
Electrodesiccation And Curettage Text: The wound bed was treated with electrodesiccation and curettage after the biopsy was performed.
Dressing: bandage
Anesthesia Volume In Cc: 2
Type Of Destruction Used: Curettage
Billing Type: Third-Party Bill
Curettage Text: The wound bed was treated with curettage after the biopsy was performed.
Detail Level: Detailed
Silver Nitrate Text: The wound bed was treated with silver nitrate after the biopsy was performed.
Biopsy Method: Personna blade
Hemostasis: Drysol
Was A Bandage Applied: Yes
Anesthesia Type: 1% lidocaine with epinephrine
Size Of Lesion In Cm: 0.1
Additional Anesthesia Volume In Cc (Will Not Render If 0): 0
Cryotherapy Text: The wound bed was treated with cryotherapy after the biopsy was performed.
Post-Care Instructions: I reviewed with the patient in detail post-care instructions. Patient is to keep the biopsy site dry overnight, and then apply bacitracin twice daily until healed. Patient may apply hydrogen peroxide soaks to remove any crusting.
Lab: [id number]
Consent: Written consent was obtained and risks were reviewed including but not limited to scarring, infection, bleeding, scabbing, incomplete removal, nerve damage and allergy to anesthesia.
Electrodesiccation Text: The wound bed was treated with electrodesiccation after the biopsy was performed.
Biopsy Type: H and E
Notification Instructions: Patient will be notified of biopsy results. However, patient instructed to call the office if not contacted within 2 weeks.
Lab Facility: 
Depth Of Biopsy: dermis
Wound Care: Vaseline

## 2019-10-31 ENCOUNTER — APPOINTMENT (RX ONLY)
Dept: URBAN - METROPOLITAN AREA CLINIC 4 | Facility: CLINIC | Age: 84
Setting detail: DERMATOLOGY
End: 2019-10-31

## 2019-10-31 PROBLEM — C44.91 BASAL CELL CARCINOMA OF SKIN, UNSPECIFIED: Status: ACTIVE | Noted: 2019-10-31

## 2019-10-31 PROCEDURE — ? MOHS SURGERY PHONE CONSULTATION

## 2019-10-31 NOTE — PROCEDURE: MOHS SURGERY PHONE CONSULTATION
Patient Preferred Phone Number: [phone]
Has The Patient Ever Had A Joint Replaced?: No
Which Antibiotic Do They Take For Surgical Prophylaxis?: Amoxicillin (2 grams)
Patient Reported Location: right nasal sidewall
Detail Level: Simple
Office Location Of Mohs Surgery: [facility]
Pathology Accession #: [id number]
Date Of Mohs Surgery: 11/25/2019
Has The Pathology Report Been Received?: Yes
Patient's Insurance: 
Referring Provider: marie
Time Of Mohs Surgery: 08:10

## 2019-11-25 ENCOUNTER — APPOINTMENT (RX ONLY)
Dept: URBAN - METROPOLITAN AREA CLINIC 36 | Facility: CLINIC | Age: 84
Setting detail: DERMATOLOGY
End: 2019-11-25

## 2019-11-25 PROBLEM — C44.311 BASAL CELL CARCINOMA OF SKIN OF NOSE: Status: ACTIVE | Noted: 2019-11-25

## 2019-11-25 PROCEDURE — 14060 TIS TRNFR E/N/E/L 10 SQ CM/<: CPT

## 2019-11-25 PROCEDURE — 17311 MOHS 1 STAGE H/N/HF/G: CPT

## 2019-11-25 PROCEDURE — 17312 MOHS ADDL STAGE: CPT

## 2019-11-25 PROCEDURE — ? MOHS SURGERY

## 2019-11-25 NOTE — PROCEDURE: MOHS SURGERY
Keystone Flap Text: The defect edges were debeveled with a #15 scalpel blade.  Given the location of the defect, shape of the defect a keystone flap was deemed most appropriate.  Using a sterile surgical marker, an appropriate keystone flap was drawn incorporating the defect, outlining the appropriate donor tissue and placing the expected incisions within the relaxed skin tension lines where possible. The area thus outlined was incised deep to adipose tissue with a #15 scalpel blade.  The skin margins were undermined to an appropriate distance in all directions around the primary defect and laterally outward around the flap utilizing iris scissors.
Unique Flap 2 Name: Peng Flap
Tarsorrhaphy Performed?: No
Anesthesia Volume In Cc: 6
M-Plasty Complex Repair Preamble Text (Leave Blank If You Do Not Want): Extensive wide undermining was performed.
Asc Procedure Text (C): After obtaining clear surgical margins the patient was sent to an ASC for surgical repair.  The patient understands they will receive post-surgical care and follow-up from the ASC physician.
Quadrants Reporting?: 0
Spiral Flap Text: The defect edges were debeveled with a #15 scalpel blade.  Given the location of the defect, shape of the defect and the proximity to free margins a spiral flap was deemed most appropriate.  Using a sterile surgical marker, an appropriate rotation flap was drawn incorporating the defect and placing the expected incisions within the relaxed skin tension lines where possible. The area thus outlined was incised deep to adipose tissue with a #15 scalpel blade.  The skin margins were undermined to an appropriate distance in all directions utilizing iris scissors.
Show Assistants Variable: Yes
Stage 9: Additional Anesthesia Type: 1% lidocaine with epinephrine
Otolaryngologist Procedure Text (A): After obtaining clear surgical margins the patient was sent to otolaryngology for surgical repair.  The patient understands they will receive post-surgical care and follow-up from the referring physician's office.
Split-Thickness Skin Graft Text: The defect edges were debeveled with a #15 scalpel blade.  Given the location of the defect, shape of the defect and the proximity to free margins a split thickness skin graft was deemed most appropriate.  Using a sterile surgical marker, the primary defect shape was transferred to the donor site. The split thickness graft was then harvested.  The skin graft was then placed in the primary defect and oriented appropriately.
Consent (Nose)/Introductory Paragraph: The rationale for Mohs was explained to the patient and consent was obtained. The risks, benefits and alternatives to therapy were discussed in detail. Specifically, the risks of nasal deformity, changes in the flow of air through the nose, infection, scarring, bleeding, prolonged wound healing, incomplete removal, allergy to anesthesia, nerve injury and recurrence were addressed. Prior to the procedure, the treatment site was clearly identified and confirmed by the patient. All components of Universal Protocol/PAUSE Rule completed.
Consent Type: Consent 1 (Standard)
Unna Boot Text: An Unna boot was placed to help immobilize the limb and facilitate more rapid healing.
Advancement Flap (Double) Text: The defect edges were debeveled with a #15 scalpel blade.  Given the location of the defect and the proximity to free margins a double advancement flap was deemed most appropriate.  Using a sterile surgical marker, the appropriate advancement flaps were drawn incorporating the defect and placing the expected incisions within the relaxed skin tension lines where possible.    The area thus outlined was incised deep to adipose tissue with a #15 scalpel blade.  The skin margins were undermined to an appropriate distance in all directions utilizing iris scissors.
Flap Type: Banner Transposition Flap
S Plasty Text: Given the location and shape of the defect, and the orientation of relaxed skin tension lines, an S-plasty was deemed most appropriate for repair.  Using a sterile surgical marker, the appropriate outline of the S-plasty was drawn, incorporating the defect and placing the expected incisions within the relaxed skin tension lines where possible.  The area thus outlined was incised deep to adipose tissue with a #15 scalpel blade.  The skin margins were undermined to an appropriate distance in all directions utilizing iris scissors. The skin flaps were advanced over the defect.  The opposing margins were then approximated with interrupted buried subcutaneous sutures.
Anesthesia Type: 0.5% lidocaine with 1:200,000 epinephrine and a 1:10 solution of 8.4% sodium bicarbonate and 408mcg clindamycin/ml
Unique Flap 2 Text: A decision was made to reconstruct the defect utilizing a Peng Flap (Bilateral Advancement Rotation Flap). Given the location of the defect and the proximity to free margins, this flap was deemed most appropriate.  Using a sterile surgical marker, the appropriate rotation flaps were drawn incorporating the defect and placing the expected incisions within the relaxed skin tension lines where possible.    The area thus outlined was incised deep to adipose tissue with a #15 scalpel blade.  The skin margins were undermined to an appropriate distance in all directions utilizing iris scissors.
Suturegard Intro: Intraoperative tissue expansion was performed, utilizing the SUTUREGARD device, in order to reduce wound tension.
Plastic Surgeon Procedure Text (C): After obtaining clear surgical margins the patient was sent to plastics for surgical repair.  The patient understands they will receive post-surgical care and follow-up from the referring physician's office.
Repair Anesthesia Method: local infiltration
Complex Repair Preamble Text (Leave Blank If You Do Not Want): Extensive wide undermining was performed at least 2 cm in all directions.
Modified Advancement Flap Text: The defect edges were debeveled with a #15 scalpel blade.  Given the location of the defect, shape of the defect and the proximity to free margins a modified advancement flap was deemed most appropriate.  Using a sterile surgical marker, an appropriate advancement flap was drawn incorporating the defect and placing the expected incisions within the relaxed skin tension lines where possible.    The area thus outlined was incised deep to adipose tissue with a #15 scalpel blade.  The skin margins were undermined to an appropriate distance in all directions utilizing iris scissors.
Dermal Autograft Text: The defect edges were debeveled with a #15 scalpel blade.  Given the location of the defect, shape of the defect and the proximity to free margins a dermal autograft was deemed most appropriate.  Using a sterile surgical marker, the primary defect shape was transferred to the donor site. The area thus outlined was incised deep to adipose tissue with a #15 scalpel blade.  The harvested graft was then trimmed of adipose and epidermal tissue until only dermis was left.  The skin graft was then placed in the primary defect and oriented appropriately.
Mid-Level Procedure Text (F): After obtaining clear surgical margins the patient was sent to a mid-level provider for surgical repair.  The patient understands they will receive post-surgical care and follow-up from the mid-level provider.
Bilobed Transposition Flap Text: The defect edges were debeveled with a #15 scalpel blade.  Given the location of the defect and the proximity to free margins a bilobed transposition flap was deemed most appropriate.  Using a sterile surgical marker, an appropriate bilobe flap drawn around the defect.    The area thus outlined was incised deep to adipose tissue with a #15 scalpel blade.  The skin margins were undermined to an appropriate distance in all directions utilizing iris scissors.
Oculoplastic Surgeon Procedure Text (B): After obtaining clear surgical margins the patient was sent to oculoplastics for surgical repair.  The patient understands they will receive post-surgical care and follow-up from the referring physician's office.
Z Plasty Text: The lesion was extirpated to the level of the fat with a #15 scalpel blade.  Given the location of the defect, shape of the defect and the proximity to free margins a Z-plasty was deemed most appropriate for repair.  Using a sterile surgical marker, the appropriate transposition arms of the Z-plasty were drawn incorporating the defect and placing the expected incisions within the relaxed skin tension lines where possible.    The area thus outlined was incised deep to adipose tissue with a #15 scalpel blade.  The skin margins were undermined to an appropriate distance in all directions utilizing iris scissors.  The opposing transposition arms were then transposed into place in opposite direction and anchored with interrupted buried subcutaneous sutures.
Cheek Interpolation Flap Text: A decision was made to reconstruct the defect utilizing an interpolation axial flap and a staged reconstruction.  A telfa template was made of the defect.  This telfa template was then used to outline the Cheek Interpolation flap.  The donor area for the pedicle flap was then injected with anesthesia.  The flap was excised through the skin and subcutaneous tissue down to the layer of the underlying musculature.  The interpolation flap was carefully excised within this deep plane to maintain its blood supply.  The edges of the donor site were undermined.   The donor site was closed in a primary fashion.  The pedicle was then rotated into position and sutured.  Once the tube was sutured into place, adequate blood supply was confirmed with blanching and refill.  The pedicle was then wrapped with xeroform gauze and dressed appropriately with a telfa and gauze bandage to ensure continued blood supply and protect the attached pedicle.
Graft Basting Suture (Optional): 5-0 Fast Absorbing Gut
Mohs Histo Method Verbiage: Each section was then chromacoded and processed in the Mohs lab using the Mohs protocol and submitted for frozen section.
Purse String (Intermediate) Text: Given the location of the defect and the characteristics of the surrounding skin a purse string intermediate closure was deemed most appropriate.  Undermining was performed circumfirentially around the surgical defect.  A purse string suture was then placed and tightened.
Number Of Stages: 2
Closure 3 Information: This tab is for additional flaps and grafts above and beyond our usual structured repairs.  Please note if you enter information here it will not currently bill and you will need to add the billing information manually.
Information: Selecting Yes will display possible errors in your note based on the variables you have selected. This validation is only offered as a suggestion for you. PLEASE NOTE THAT THE VALIDATION TEXT WILL BE REMOVED WHEN YOU FINALIZE YOUR NOTE. IF YOU WANT TO FAX A PRELIMINARY NOTE YOU WILL NEED TO TOGGLE THIS TO 'NO' IF YOU DO NOT WANT IT IN YOUR FAXED NOTE.
Epidermal Sutures: 5-0 Ethilon
Secondary Intention Text (Leave Blank If You Do Not Want): The defect will heal with secondary intention.
Retention Suture Bite Size: 3 mm
Helical Rim Advancement Flap Text: The defect edges were debeveled with a #15 blade scalpel.  Given the location of the defect and the proximity to free margins (helical rim) a double helical rim advancement flap was deemed most appropriate.  Using a sterile surgical marker, the appropriate advancement flaps were drawn incorporating the defect and placing the expected incisions between the helical rim and antihelix where possible.  The area thus outlined was incised through and through with a #15 scalpel blade.  With a skin hook and iris scissors, the flaps were gently and sharply undermined and freed up.
Body Location Override (Optional - Billing Will Still Be Based On Selected Body Map Location If Applicable): right nasal sidewall
Inflammation Suggestive Of Cancer Camouflage Histology Text: There was a dense lymphocytic infiltrate which prevented adequate histologic evaluation of adjacent structures.
Consent 1/Introductory Paragraph: The rationale for Mohs was explained to the patient and consent was obtained. The risks, benefits and alternatives to therapy were discussed in detail. Specifically, the risks of infection, scarring, bleeding, prolonged wound healing, incomplete removal, allergy to anesthesia, nerve injury and recurrence were addressed. Prior to the procedure, the treatment site was clearly identified and confirmed by the patient. All components of Universal Protocol/PAUSE Rule completed.
Mastoid Interpolation Flap Text: A decision was made to reconstruct the defect utilizing an interpolation axial flap and a staged reconstruction.  A telfa template was made of the defect.  This telfa template was then used to outline the mastoid interpolation flap.  The donor area for the pedicle flap was then injected with anesthesia.  The flap was excised through the skin and subcutaneous tissue down to the layer of the underlying musculature.  The pedicle flap was carefully excised within this deep plane to maintain its blood supply.  The edges of the donor site were undermined.   The donor site was closed in a primary fashion.  The pedicle was then rotated into position and sutured.  Once the tube was sutured into place, adequate blood supply was confirmed with blanching and refill.  The pedicle was then wrapped with xeroform gauze and dressed appropriately with a telfa and gauze bandage to ensure continued blood supply and protect the attached pedicle.
Provider Procedure Text (D): After obtaining clear surgical margins the defect was repaired by another provider.
Double O-Z Flap Text: The defect edges were debeveled with a #15 scalpel blade.  Given the location of the defect, shape of the defect and the proximity to free margins a Double O-Z flap was deemed most appropriate.  Using a sterile surgical marker, an appropriate transposition flap was drawn incorporating the defect and placing the expected incisions within the relaxed skin tension lines where possible. The area thus outlined was incised deep to adipose tissue with a #15 scalpel blade.  The skin margins were undermined to an appropriate distance in all directions utilizing iris scissors.
Stage 2: Additional Anesthesia Type: 1% lidocaine with 1:100,000 epinephrine and 408mcg clindamycin/ml and a 1:10 solution of 8.4% sodium bicarbonate
Tarsorrhaphy Text: A tarsorrhaphy was performed using Frost sutures.
Island Pedicle Flap With Canthal Suspension Text: The defect edges were debeveled with a #15 scalpel blade.  Given the location of the defect, shape of the defect and the proximity to free margins an island pedicle advancement flap was deemed most appropriate.  Using a sterile surgical marker, an appropriate advancement flap was drawn incorporating the defect, outlining the appropriate donor tissue and placing the expected incisions within the relaxed skin tension lines where possible. The area thus outlined was incised deep to adipose tissue with a #15 scalpel blade.  The skin margins were undermined to an appropriate distance in all directions around the primary defect and laterally outward around the island pedicle utilizing iris scissors.  There was minimal undermining beneath the pedicle flap. A suspension suture was placed in the canthal tendon to prevent tension and prevent ectropion.
M-Plasty Intermediate Repair Preamble Text (Leave Blank If You Do Not Want): Undermining was performed with blunt dissection.
Melolabial Transposition Flap Text: The defect edges were debeveled with a #15 scalpel blade.  Given the location of the defect and the proximity to free margins a melolabial flap was deemed most appropriate.  Using a sterile surgical marker, an appropriate melolabial transposition flap was drawn incorporating the defect.    The area thus outlined was incised deep to adipose tissue with a #15 scalpel blade.  The skin margins were undermined to an appropriate distance in all directions utilizing iris scissors.
Cheiloplasty (Complex) Text: A decision was made to reconstruct the defect with a  cheiloplasty.  The defect was undermined extensively.  Additional obicularis oris muscle was excised with a 15 blade scalpel.  The defect was converted into a full thickness wedge to facilite a better cosmetic result.  Small vessels were then tied off with 5-0 monocyrl. The obicularis oris, superficial fascia, adipose and dermis were then reapproximated.  After the deeper layers were approximated the epidermis was reapproximated with particular care given to realign the vermilion border.
Consent (Marginal Mandibular)/Introductory Paragraph: The rationale for Mohs was explained to the patient and consent was obtained. The risks, benefits and alternatives to therapy were discussed in detail. Specifically, the risks of damage to the marginal mandibular branch of the facial nerve, infection, scarring, bleeding, prolonged wound healing, incomplete removal, allergy to anesthesia, and recurrence were addressed. Prior to the procedure, the treatment site was clearly identified and confirmed by the patient. All components of Universal Protocol/PAUSE Rule completed.
Epidermal Closure Graft Donor Site (Optional): simple interrupted
Previous Accession (Optional): [id number]
Post-Care Instructions: I reviewed with the patient in detail post-care instructions. Patient is not to engage in any heavy lifting, exercise, or swimming for the next 14 days. Should the patient develop any fevers, chills, bleeding, severe pain patient will contact the office immediately.
A-T Advancement Flap Text: The defect edges were debeveled with a #15 scalpel blade.  Given the location of the defect, shape of the defect and the proximity to free margins an A-T advancement flap was deemed most appropriate.  Using a sterile surgical marker, an appropriate advancement flap was drawn incorporating the defect and placing the expected incisions within the relaxed skin tension lines where possible.    The area thus outlined was incised deep to adipose tissue with a #15 scalpel blade.  The skin margins were undermined to an appropriate distance in all directions utilizing iris scissors.
Star Wedge Flap Text: The defect edges were debeveled with a #15 scalpel blade.  Given the location of the defect, shape of the defect and the proximity to free margins a star wedge flap was deemed most appropriate.  Using a sterile surgical marker, an appropriate rotation flap was drawn incorporating the defect and placing the expected incisions within the relaxed skin tension lines where possible. The area thus outlined was incised deep to adipose tissue with a #15 scalpel blade.  The skin margins were undermined to an appropriate distance in all directions utilizing iris scissors.
Oculoplastic Surgeon (A): Ag
O-T Plasty Text: The defect edges were debeveled with a #15 scalpel blade.  Given the location of the defect, shape of the defect and the proximity to free margins an O-T plasty was deemed most appropriate.  Using a sterile surgical marker, an appropriate O-T plasty was drawn incorporating the defect and placing the expected incisions within the relaxed skin tension lines where possible.    The area thus outlined was incised deep to adipose tissue with a #15 scalpel blade.  The skin margins were undermined to an appropriate distance in all directions utilizing iris scissors.
Unique Flap 3 Name: Mercedes Flap
Home Suture Removal Text: Patient was provided instructions on removing sutures and will remove their sutures at home.  If they have any questions or difficulties they will call the office.
Burow's Advancement Flap Text: The defect edges were debeveled with a #15 scalpel blade.  Given the location of the defect and the proximity to free margins a Burow's advancement flap was deemed most appropriate.  Using a sterile surgical marker, the appropriate advancement flap was drawn incorporating the defect and placing the expected incisions within the relaxed skin tension lines where possible.    The area thus outlined was incised deep to adipose tissue with a #15 scalpel blade.  The skin margins were undermined to an appropriate distance in all directions utilizing iris scissors.
Consent (Lip)/Introductory Paragraph: The rationale for Mohs was explained to the patient and consent was obtained. The risks, benefits and alternatives to therapy were discussed in detail. Specifically, the risks of lip deformity, changes in the oral aperture, infection, scarring, bleeding, prolonged wound healing, incomplete removal, allergy to anesthesia, nerve injury and recurrence were addressed. Prior to the procedure, the treatment site was clearly identified and confirmed by the patient. All components of Universal Protocol/PAUSE Rule completed.
Cartilage Graft Text: The defect edges were debeveled with a #15 scalpel blade.  Given the location of the defect, shape of the defect, the fact the defect involved a full thickness cartilage defect a cartilage graft was deemed most appropriate.  An appropriate donor site was identified, cleansed, and anesthetized. The cartilage graft was then harvested and transferred to the recipient site, oriented appropriately and then sutured into place.  The secondary defect was then repaired using a primary closure.
Closure 2 Information: This tab is for additional flaps and grafts, including complex repair and grafts and complex repair and flaps. You can also specify a different location for the additional defect, if the location is the same you do not need to select a new one. We will insert the automated text for the repair you select below just as we do for solitary flaps and grafts. Please note that at this time if you select a location with a different insurance zone you will need to override the ICD10 and CPT if appropriate.
Mucosal Advancement Flap Text: Given the location of the defect, shape of the defect and the proximity to free margins a mucosal advancement flap was deemed most appropriate. Incisions were made with a 15 blade scalpel in the appropriate fashion along the cutaneous vermilion border and the mucosal lip. The remaining actinically damaged mucosal tissue was excised.  The mucosal advancement flap was then elevated to the gingival sulcus with care taken to preserve the neurovascular structures and advanced into the primary defect. Care was taken to ensure that precise realignment of the vermilion border was achieved.
V-Y Plasty Text: The defect edges were debeveled with a #15 scalpel blade.  Given the location of the defect, shape of the defect and the proximity to free margins an V-Y advancement flap was deemed most appropriate.  Using a sterile surgical marker, an appropriate advancement flap was drawn incorporating the defect and placing the expected incisions within the relaxed skin tension lines where possible.    The area thus outlined was incised deep to adipose tissue with a #15 scalpel blade.  The skin margins were undermined to an appropriate distance in all directions utilizing iris scissors.
Wound Care: Aquaphor
Unique Flap 3 Text: The defect edges were debeveled with a #15 scalpel blade.  Given the location of the defect, shape of the defect and the proximity to free margins a Mercedes (double advancement flap) was deemed most appropriate.  Using a sterile surgical marker, the appropriate transposition flaps were drawn incorporating the defect and placing the expected incisions within the relaxed skin tension lines where possible.    The area thus outlined was incised deep to adipose tissue with a #15 scalpel blade.  The skin margins were undermined to an appropriate distance in all directions utilizing iris scissors.  Hemostasis was achieved with electrocautery.  The flaps were then advanced into the defect and anchored with interrupted buried subcutaneous sutures.
Suturegard Body: The suture ends were repeatedly re-tightened and re-clamped to achieve the desired tissue expansion.
Location Indication Override (Is Already Calculated Based On Selected Body Location): Area H
Trilobed Flap Text: The defect edges were debeveled with a #15 scalpel blade.  Given the location of the defect and the proximity to free margins a trilobed flap was deemed most appropriate.  Using a sterile surgical marker, an appropriate trilobed flap drawn around the defect.    The area thus outlined was incised deep to adipose tissue with a #15 scalpel blade.  The skin margins were undermined to an appropriate distance in all directions utilizing iris scissors.
Skin Substitute Text: The defect edges were debeveled with a #15 scalpel blade.  Given the location of the defect, shape of the defect and the proximity to free margins a skin substitute graft was deemed most appropriate.  The graft material was trimmed to fit the size of the defect. The graft was then placed in the primary defect and oriented appropriately.
Pain Refusal Text: I offered to prescribe pain medication but the patient refused to take this medication.
Hemostasis: Electrocautery
Wound Care (No Sutures): Petrolatum
Cheek-To-Nose Interpolation Flap Text: A decision was made to reconstruct the defect utilizing an interpolation axial flap and a staged reconstruction.  A telfa template was made of the defect.  This telfa template was then used to outline the Cheek-To-Nose Interpolation flap.  The donor area for the pedicle flap was then injected with anesthesia.  The flap was excised through the skin and subcutaneous tissue down to the layer of the underlying musculature.  The interpolation flap was carefully excised within this deep plane to maintain its blood supply.  The edges of the donor site were undermined.   The donor site was closed in a primary fashion.  The pedicle was then rotated into position and sutured.  Once the tube was sutured into place, adequate blood supply was confirmed with blanching and refill.  The pedicle was then wrapped with xeroform gauze and dressed appropriately with a telfa and gauze bandage to ensure continued blood supply and protect the attached pedicle.
No Repair - Repaired With Adjacent Surgical Defect Text (Leave Blank If You Do Not Want): After obtaining clear surgical margins the defect was repaired concurrently with another surgical defect which was in close approximation.
Bilateral Helical Rim Advancement Flap Text: The defect edges were debeveled with a #15 blade scalpel.  Given the location of the defect and the proximity to free margins (helical rim) a bilateral helical rim advancement flap was deemed most appropriate.  Using a sterile surgical marker, the appropriate advancement flaps were drawn incorporating the defect and placing the expected incisions between the helical rim and antihelix where possible.  The area thus outlined was incised through and through with a #15 scalpel blade.  With a skin hook and iris scissors, the flaps were gently and sharply undermined and freed up.
Partial Purse String (Simple) Text: Given the location of the defect and the characteristics of the surrounding skin a simple purse string closure was deemed most appropriate.  Undermining was performed circumfirentially around the surgical defect.  A purse string suture was then placed and tightened. Wound tension only allowed a partial closure of the circular defect.
Primary Defect Length In Cm (Final Defect Size - Required For Flaps/Grafts): 0.9
Postop Diagnosis: same
Area H Indication Text: Tumors in this location are included in Area H (eyelids, eyebrows, nose, lips, chin, ear, pre-auricular, post-auricular, temple, genitalia, hands, feet, ankles and areola).  Tissue conservation is critical in these anatomic locations.
Eye Protection Verbiage: Before proceeding with the stage, a plastic scleral shield was inserted. The globe was anesthetized with a few drops of 1% lidocaine with 1:100,000 epinephrine. Then, an appropriate sized scleral shield was chosen and coated with lacrilube ointment. The shield was gently inserted and left in place for the duration of each stage. After the stage was completed, the shield was gently removed.
V-Y Flap Text: The defect edges were debeveled with a #15 scalpel blade.  Given the location of the defect, shape of the defect and the proximity to free margins a V-Y flap was deemed most appropriate.  Using a sterile surgical marker, an appropriate advancement flap was drawn incorporating the defect and placing the expected incisions within the relaxed skin tension lines where possible.    The area thus outlined was incised deep to adipose tissue with a #15 scalpel blade.  The skin margins were undermined to an appropriate distance in all directions utilizing iris scissors.
Suture Removal: 7 days
Consent 2/Introductory Paragraph: Mohs surgery was explained to the patient and consent was obtained. The risks, benefits and alternatives to therapy were discussed in detail. Specifically, the risks of infection, scarring, bleeding, prolonged wound healing, incomplete removal, allergy to anesthesia, nerve injury and recurrence were addressed. Prior to the procedure, the treatment site was clearly identified and confirmed by the patient. All components of Universal Protocol/PAUSE Rule completed.
Posterior Auricular Interpolation Flap Text: A decision was made to reconstruct the defect utilizing an interpolation axial flap and a staged reconstruction.  A telfa template was made of the defect.  This telfa template was then used to outline the posterior auricular interpolation flap.  The donor area for the pedicle flap was then injected with anesthesia.  The flap was excised through the skin and subcutaneous tissue down to the layer of the underlying musculature.  The pedicle flap was carefully excised within this deep plane to maintain its blood supply.  The edges of the donor site were undermined.   The donor site was closed in a primary fashion.  The pedicle was then rotated into position and sutured.  Once the tube was sutured into place, adequate blood supply was confirmed with blanching and refill.  The pedicle was then wrapped with xeroform gauze and dressed appropriately with a telfa and gauze bandage to ensure continued blood supply and protect the attached pedicle.
Surgical Defect Length In Cm (Optional): 0.7
Rhombic Flap Text: The defect edges were debeveled with a #15 scalpel blade.  Given the location of the defect and the proximity to free margins a rhombic flap was deemed most appropriate.  Using a sterile surgical marker, an appropriate rhombic flap was drawn incorporating the defect.    The area thus outlined was incised deep to adipose tissue with a #15 scalpel blade.  The skin margins were undermined to an appropriate distance in all directions utilizing iris scissors.
Complex Repair And Flap Additional Text (Will Appearing After The Standard Complex Repair Text): The complex repair was not sufficient to completely close the primary defect. The remaining additional defect was repaired with the flap mentioned below.
Repair Type: Flap
Alar Island Pedicle Flap Text: The defect edges were debeveled with a #15 scalpel blade.  Given the location of the defect, shape of the defect and the proximity to the alar rim an island pedicle advancement flap was deemed most appropriate.  Using a sterile surgical marker, an appropriate advancement flap was drawn incorporating the defect, outlining the appropriate donor tissue and placing the expected incisions within the nasal ala running parallel to the alar rim. The area thus outlined was incised with a #15 scalpel blade.  The skin margins were undermined minimally to an appropriate distance in all directions around the primary defect and laterally outward around the island pedicle utilizing iris scissors.  There was minimal undermining beneath the pedicle flap.
Stage 2: Number Of Blocks?: 1
O-T Advancement Flap Text: The defect edges were debeveled with a #15 scalpel blade.  Given the location of the defect, shape of the defect and the proximity to free margins an O-T advancement flap was deemed most appropriate.  Using a sterile surgical marker, an appropriate advancement flap was drawn incorporating the defect and placing the expected incisions within the relaxed skin tension lines where possible.    The area thus outlined was incised deep to adipose tissue with a #15 scalpel blade.  The skin margins were undermined to an appropriate distance in all directions utilizing iris scissors.
Consent (Spinal Accessory)/Introductory Paragraph: The rationale for Mohs was explained to the patient and consent was obtained. The risks, benefits and alternatives to therapy were discussed in detail. Specifically, the risks of damage to the spinal accessory nerve, infection, scarring, bleeding, prolonged wound healing, incomplete removal, allergy to anesthesia, and recurrence were addressed. Prior to the procedure, the treatment site was clearly identified and confirmed by the patient. All components of Universal Protocol/PAUSE Rule completed.
Ear Wedge Repair Text: A wedge excision was completed by carrying down an excision through the full thickness of the ear and cartilage with an inward facing Burow's triangle. The wound was then closed in a layered fashion.
Full Thickness Lip Wedge Repair (Flap) Text: Given the location of the defect and the proximity to free margins a full thickness wedge repair was deemed most appropriate.  Using a sterile surgical marker, the appropriate repair was drawn incorporating the defect and placing the expected incisions perpendicular to the vermilion border.  The vermilion border was also meticulously outlined to ensure appropriate reapproximation during the repair.  The area thus outlined was incised through and through with a #15 scalpel blade.  The muscularis and dermis were reaproximated with deep sutures following hemostasis. Care was taken to realign the vermilion border before proceeding with the superficial closure.  Once the vermilion was realigned the superfical and mucosal closure was finished.
Consent (Near Eyelid Margin)/Introductory Paragraph: The rationale for Mohs was explained to the patient and consent was obtained. The risks, benefits and alternatives to therapy were discussed in detail. Specifically, the risks of ectropion or eyelid deformity, infection, scarring, bleeding, prolonged wound healing, incomplete removal, allergy to anesthesia, nerve injury and recurrence were addressed. Prior to the procedure, the treatment site was clearly identified and confirmed by the patient. All components of Universal Protocol/PAUSE Rule completed.
Graft Donor Site Bandage (Optional-Leave Blank If You Don't Want In Note): Aquaphor and telefa placed on wound. Pressure dressing applied to donor site
Bcc Histology Text: There were numerous aggregates of basaloid cells.
Secondary Defect Length In Cm (Required For Flaps): 2.9
Chonodrocutaneous Helical Advancement Flap Text: The defect edges were debeveled with a #15 scalpel blade.  Given the location of the defect and the proximity to free margins a chondrocutaneous helical advancement flap was deemed most appropriate.  Using a sterile surgical marker, the appropriate advancement flap was drawn incorporating the defect and placing the expected incisions within the relaxed skin tension lines where possible.    The area thus outlined was incised deep to adipose tissue with a #15 scalpel blade.  The skin margins were undermined to an appropriate distance in all directions utilizing iris scissors.
O-Z Plasty Text: The defect edges were debeveled with a #15 scalpel blade.  Given the location of the defect, shape of the defect and the proximity to free margins an O-Z plasty (double transposition flap) was deemed most appropriate.  Using a sterile surgical marker, the appropriate transposition flaps were drawn incorporating the defect and placing the expected incisions within the relaxed skin tension lines where possible.    The area thus outlined was incised deep to adipose tissue with a #15 scalpel blade.  The skin margins were undermined to an appropriate distance in all directions utilizing iris scissors.  Hemostasis was achieved with electrocautery.  The flaps were then transposed into place, one clockwise and the other counterclockwise, and anchored with interrupted buried subcutaneous sutures.
Unique Flap 4 Name: Banner Flap
Hatchet Flap Text: The defect edges were debeveled with a #15 scalpel blade.  Given the location of the defect, shape of the defect and the proximity to free margins a hatchet flap based from the glabella was deemed most appropriate.  Using a sterile surgical marker, an appropriate glabellar hatchet flap was drawn incorporating the defect and placing the expected incisions within the relaxed skin tension lines where possible.    The area thus outlined was incised deep to adipose tissue with a #15 scalpel blade.  The skin margins were undermined to an appropriate distance in all directions utilizing iris scissors.
Composite Graft Text: The defect edges were debeveled with a #15 scalpel blade.  Given the location of the defect, shape of the defect, the proximity to free margins and the fact the defect was full thickness a composite graft was deemed most appropriate.  The defect was outline and then transferred to the donor site.  A full thickness graft was then excised from the donor site. The graft was then placed in the primary defect, oriented appropriately and then sutured into place.  The secondary defect was then repaired using a primary closure.
Consent (Scalp)/Introductory Paragraph: The rationale for Mohs was explained to the patient and consent was obtained. The risks, benefits and alternatives to therapy were discussed in detail. Specifically, the risks of changes in hair growth pattern secondary to repair, infection, scarring, bleeding, prolonged wound healing, incomplete removal, allergy to anesthesia, nerve injury and recurrence were addressed. Prior to the procedure, the treatment site was clearly identified and confirmed by the patient. All components of Universal Protocol/PAUSE Rule completed.
Dorsal Nasal Flap Text: The defect edges were debeveled with a #15 scalpel blade.  Given the location of the defect and the proximity to free margins a dorsal nasal flap,based upon the glabellar folds, was deemed most appropriate.  Using a sterile surgical marker, an appropriate dorsal nasal flap was drawn around the defect.    The area thus outlined was incised deep to adipose tissue with a #15 scalpel blade.  The skin margins were undermined to an appropriate distance in all directions utilizing iris scissors.
H Plasty Text: Given the location of the defect, shape of the defect and the proximity to free margins a H-plasty was deemed most appropriate for repair.  Using a sterile surgical marker, the appropriate advancement arms of the H-plasty were drawn incorporating the defect and placing the expected incisions within the relaxed skin tension lines where possible. The area thus outlined was incised deep to adipose tissue with a #15 scalpel blade. The skin margins were undermined to an appropriate distance in all directions utilizing iris scissors.  The opposing advancement arms were then advanced into place in opposite direction and anchored with interrupted buried subcutaneous sutures.
Unique Flap 4 Text: The defect edges were debeveled with a #15 scalpel blade.  Given the location of the defect and the proximity to free margins a Banner transposition flap was deemed most appropriate.  Using a sterile surgical marker, an appropriate Banner transposition flap was drawn incorporating the defect.    The area thus outlined was incised deep to adipose tissue with a #15 scalpel blade.  The skin margins were undermined to an appropriate distance in all directions utilizing iris scissors.
Donor Site Anesthesia Type: same as repair anesthesia
Mauc Instructions: By selecting yes to the question below the MAUC number will be added into the note.  This will be calculated automatically based on the diagnosis chosen, the size entered, the body zone selected (H,M,L) and the specific indications you chose. You will also have the option to override the Mohs AUC if you disagree with the automatically calculated number and this option is found in the Case Summary tab.
Subsequent Stages Histo Method Verbiage: Using a similar technique to that described above, a thin layer of tissue was removed from all areas where tumor was visible on the previous stage.  The tissue was again oriented, mapped, dyed, and processed as above.
Tissue Cultured Epidermal Autograft Text: The defect edges were debeveled with a #15 scalpel blade.  Given the location of the defect, shape of the defect and the proximity to free margins a tissue cultured epidermal autograft was deemed most appropriate.  The graft was then trimmed to fit the size of the defect.  The graft was then placed in the primary defect and oriented appropriately.
Surgeon Performing Repair (Optional): Kathy
Ear Star Wedge Flap Text: The defect edges were debeveled with a #15 blade scalpel.  Given the location of the defect and the proximity to free margins (helical rim) an ear star wedge flap was deemed most appropriate.  Using a sterile surgical marker, the appropriate flap was drawn incorporating the defect and placing the expected incisions between the helical rim and antihelix where possible.  The area thus outlined was incised through and through with a #15 scalpel blade.
Length To Time In Minutes Device Was In Place: 10
Same Histology In Subsequent Stages Text: The pattern and morphology of the tumor is as described in the first stage.
Medical Necessity Statement: Based on my medical judgement, Mohs surgery is the most appropriate treatment for this cancer compared to other treatments.
Interpolation Flap Text: A decision was made to reconstruct the defect utilizing an interpolation axial flap and a staged reconstruction.  A telfa template was made of the defect.  This telfa template was then used to outline the interpolation flap.  The donor area for the pedicle flap was then injected with anesthesia.  The flap was excised through the skin and subcutaneous tissue down to the layer of the underlying musculature.  The interpolation flap was carefully excised within this deep plane to maintain its blood supply.  The edges of the donor site were undermined.   The donor site was closed in a primary fashion.  The pedicle was then rotated into position and sutured.  Once the tube was sutured into place, adequate blood supply was confirmed with blanching and refill.  The pedicle was then wrapped with xeroform gauze and dressed appropriately with a telfa and gauze bandage to ensure continued blood supply and protect the attached pedicle.
Area M Indication Text: Tumors in this location are included in Area M (cheek, forehead, scalp, neck, jawline and pretibial skin).  Mohs surgery is indicated for tumors in these anatomic locations.
Mohs Method Verbiage: An incision at a 45 degree angle following the standard Mohs approach was done and the specimen was harvested as a microscopic controlled layer.
Advancement-Rotation Flap Text: The defect edges were debeveled with a #15 scalpel blade.  Given the location of the defect, shape of the defect and the proximity to free margins an advancement-rotation flap was deemed most appropriate.  Using a sterile surgical marker, an appropriate flap was drawn incorporating the defect and placing the expected incisions within the relaxed skin tension lines where possible. The area thus outlined was incised deep to adipose tissue with a #15 scalpel blade.  The skin margins were undermined to an appropriate distance in all directions utilizing iris scissors.
Partial Purse String (Intermediate) Text: Given the location of the defect and the characteristics of the surrounding skin an intermediate purse string closure was deemed most appropriate.  Undermining was performed circumfirentially around the surgical defect.  A purse string suture was then placed and tightened. Wound tension only allowed a partial closure of the circular defect.
Primary Defect Width In Cm (Final Defect Size - Required For Flaps/Grafts): 0.8
Non-Graft Cartilage Fenestration Text: The cartilage was fenestrated with a 2mm punch biopsy to help facilitate healing.
Surgical Defect Width In Cm (Optional): 0.6
Suturegard Retention Suture: 2-0 Nylon
Rhomboid Transposition Flap Text: The defect edges were debeveled with a #15 scalpel blade.  Given the location of the defect and the proximity to free margins a rhomboid transposition flap was deemed most appropriate.  Using a sterile surgical marker, an appropriate rhomboid flap was drawn incorporating the defect.    The area thus outlined was incised deep to adipose tissue with a #15 scalpel blade.  The skin margins were undermined to an appropriate distance in all directions utilizing iris scissors.
Mohs Case Number: [id number]
Consent 3/Introductory Paragraph: I gave the patient a chance to ask questions they had about the procedure.  Following this I explained the Mohs procedure and consent was obtained. The risks, benefits and alternatives to therapy were discussed in detail. Specifically, the risks of infection, scarring, bleeding, prolonged wound healing, incomplete removal, allergy to anesthesia, nerve injury and recurrence were addressed. Prior to the procedure, the treatment site was clearly identified and confirmed by the patient. All components of Universal Protocol/PAUSE Rule completed.
Paramedian Forehead Flap Text: A decision was made to reconstruct the defect utilizing an interpolation axial flap and a staged reconstruction.  A telfa template was made of the defect.  This telfa template was then used to outline the paramedian forehead pedicle flap.  The donor area for the pedicle flap was then injected with anesthesia.  The flap was excised through the skin and subcutaneous tissue down to the layer of the underlying musculature.  The pedicle flap was carefully excised within this deep plane to maintain its blood supply.  The edges of the donor site were undermined.   The donor site was closed in a primary fashion.  The pedicle was then rotated into position and sutured.  Once the tube was sutured into place, adequate blood supply was confirmed with blanching and refill.  The pedicle was then wrapped with xeroform gauze and dressed appropriately with a telfa and gauze bandage to ensure continued blood supply and protect the attached pedicle.
Graft Donor Site Epidermal Sutures (Optional): 5-0 Ethibond
O-L Flap Text: The defect edges were debeveled with a #15 scalpel blade.  Given the location of the defect, shape of the defect and the proximity to free margins an O-L flap was deemed most appropriate.  Using a sterile surgical marker, an appropriate advancement flap was drawn incorporating the defect and placing the expected incisions within the relaxed skin tension lines where possible.    The area thus outlined was incised deep to adipose tissue with a #15 scalpel blade.  The skin margins were undermined to an appropriate distance in all directions utilizing iris scissors.
Complex Repair And Graft Additional Text (Will Appearing After The Standard Complex Repair Text): The complex repair was not sufficient to completely close the primary defect. The remaining additional defect was repaired with the graft mentioned below.
Double Island Pedicle Flap Text: The defect edges were debeveled with a #15 scalpel blade.  Given the location of the defect, shape of the defect and the proximity to free margins a double island pedicle advancement flap was deemed most appropriate.  Using a sterile surgical marker, an appropriate advancement flap was drawn incorporating the defect, outlining the appropriate donor tissue and placing the expected incisions within the relaxed skin tension lines where possible.    The area thus outlined was incised deep to adipose tissue with a #15 scalpel blade.  The skin margins were undermined to an appropriate distance in all directions around the primary defect and laterally outward around the island pedicle utilizing iris scissors.  There was minimal undermining beneath the pedicle flap.
Epidermal Closure: running cuticular
Transposition Flap Text: The defect edges were debeveled with a #15 scalpel blade.  Given the location of the defect and the proximity to free margins a transposition flap was deemed most appropriate.  Using a sterile surgical marker, an appropriate transposition flap was drawn incorporating the defect.    The area thus outlined was incised deep to adipose tissue with a #15 scalpel blade.  The skin margins were undermined to an appropriate distance in all directions utilizing iris scissors.
Secondary Defect Width In Cm (Required For Flaps): 1.3
Where Do You Want The Question To Include Opioid Counseling Located?: Case Summary Tab
Crescentic Advancement Flap Text: The defect edges were debeveled with a #15 scalpel blade.  Given the location of the defect and the proximity to free margins a crescentic advancement flap was deemed most appropriate.  Using a sterile surgical marker, the appropriate advancement flap was drawn incorporating the defect and placing the expected incisions within the relaxed skin tension lines where possible.    The area thus outlined was incised deep to adipose tissue with a #15 scalpel blade.  The skin margins were undermined to an appropriate distance in all directions utilizing iris scissors.
Consent (Ear)/Introductory Paragraph: The rationale for Mohs was explained to the patient and consent was obtained. The risks, benefits and alternatives to therapy were discussed in detail. Specifically, the risks of ear deformity, infection, scarring, bleeding, prolonged wound healing, incomplete removal, allergy to anesthesia, nerve injury and recurrence were addressed. Prior to the procedure, the treatment site was clearly identified and confirmed by the patient. All components of Universal Protocol/PAUSE Rule completed.
Ftsg Text: The defect edges were debeveled with a #15 scalpel blade.  Given the location of the defect, shape of the defect and the proximity to free margins a full thickness skin graft was deemed most appropriate.  Using a sterile surgical marker, the primary defect shape was transferred to the donor site. The area thus outlined was incised deep to adipose tissue with a #15 scalpel blade.  The harvested graft was then trimmed of adipose tissue until only dermis and epidermis was left.  The skin margins of the secondary defect were undermined to an appropriate distance in all directions utilizing iris scissors.  The secondary defect was closed with interrupted buried subcutaneous sutures.  The skin edges were then re-apposed with running  sutures.  The skin graft was then placed in the primary defect and oriented appropriately.
Referring Physician (Optional): Lizette
Bcc Infiltrative Histology Text: There were numerous aggregates of basaloid cells demonstrating an infiltrative pattern.
Rotation Flap Text: The defect edges were debeveled with a #15 scalpel blade.  Given the location of the defect, shape of the defect and the proximity to free margins a rotation flap was deemed most appropriate.  Using a sterile surgical marker, an appropriate rotation flap was drawn incorporating the defect and placing the expected incisions within the relaxed skin tension lines where possible.    The area thus outlined was incised deep to adipose tissue with a #15 scalpel blade.  The skin margins were undermined to an appropriate distance in all directions utilizing iris scissors.
Double O-Z Plasty Text: The defect edges were debeveled with a #15 scalpel blade.  Given the location of the defect, shape of the defect and the proximity to free margins a Double O-Z plasty (double transposition flap) was deemed most appropriate.  Using a sterile surgical marker, the appropriate transposition flaps were drawn incorporating the defect and placing the expected incisions within the relaxed skin tension lines where possible. The area thus outlined was incised deep to adipose tissue with a #15 scalpel blade.  The skin margins were undermined to an appropriate distance in all directions utilizing iris scissors.  Hemostasis was achieved with electrocautery.  The flaps were then transposed into place, one clockwise and the other counterclockwise, and anchored with interrupted buried subcutaneous sutures.
Unique Flap 1 Text: A decision was made to reconstruct the defect utilizing a myocutaneous Island pedicle Flap based on the levator labii superioris muscle.  A telfa template was made of the defect.  This telfa template was then used to outline the myocutaneous flap, based along the meilolabial fold.  The donor area for the pedicle flap was then injected with anesthesia.  The flap was excised through the skin and subcutaneous tissue down to the layer of the underlying musculature.  The myocutaneous flap was carefully excised within this deep plane to maintain its blood supply. Based on the muscle. The edges of the donor site were undermined.   The donor site was closed in a primary fashion to the point of transposition.  The pedicle was then transposed into position and sutured.  Once the flap was sutured into place, adequate blood supply was confirmed with blanching and refill.
Advancement Flap (Single) Text: The defect edges were debeveled with a #15 scalpel blade.  Given the location of the defect and the proximity to free margins a single advancement flap was deemed most appropriate.  Using a sterile surgical marker, an appropriate advancement flap was drawn incorporating the defect and placing the expected incisions within the relaxed skin tension lines where possible.    The area thus outlined was incised deep to adipose tissue with a #15 scalpel blade.  The skin margins were undermined to an appropriate distance in all directions utilizing iris scissors.
Epidermal Autograft Text: The defect edges were debeveled with a #15 scalpel blade.  Given the location of the defect, shape of the defect and the proximity to free margins an epidermal autograft was deemed most appropriate.  Using a sterile surgical marker, the primary defect shape was transferred to the donor site. The epidermal graft was then harvested.  The skin graft was then placed in the primary defect and oriented appropriately.
Detail Level: Detailed
Mohs Rapid Report Verbiage: The area of clinically evident tumor was marked with skin marking ink and appropriately hatched.  The initial incision was made following the Mohs approach through the skin.  The specimen was taken to the lab, divided into the necessary number of pieces, chromacoded and processed according to the Mohs protocol.  This was repeated in successive stages until a tumor free defect was achieved.
Mercedes Flap Text: The defect edges were debeveled with a #15 scalpel blade.  Given the location of the defect, shape of the defect and the proximity to free margins a Mercedes flap was deemed most appropriate.  Using a sterile surgical marker, an appropriate advancement flap was drawn incorporating the defect and placing the expected incisions within the relaxed skin tension lines where possible. The area thus outlined was incised deep to adipose tissue with a #15 scalpel blade.  The skin margins were undermined to an appropriate distance in all directions utilizing iris scissors.
W Plasty Text: The lesion was extirpated to the level of the fat with a #15 scalpel blade.  Given the location of the defect, shape of the defect and the proximity to free margins a W-plasty was deemed most appropriate for repair.  Using a sterile surgical marker, the appropriate transposition arms of the W-plasty were drawn incorporating the defect and placing the expected incisions within the relaxed skin tension lines where possible.    The area thus outlined was incised deep to adipose tissue with a #15 scalpel blade.  The skin margins were undermined to an appropriate distance in all directions utilizing iris scissors.  The opposing transposition arms were then transposed into place in opposite direction and anchored with interrupted buried subcutaneous sutures.
Manual Repair Warning Statement: We plan on removing the manually selected variable below in favor of our much easier automatic structured text blocks found in the previous tab. We decided to do this to help make the flow better and give you the full power of structured data. Manual selection is never going to be ideal in our platform and I would encourage you to avoid using manual selection from this point on, especially since I will be sunsetting this feature. It is important that you do one of two things with the customized text below. First, you can save all of the text in a word file so you can have it for future reference. Second, transfer the text to the appropriate area in the Library tab. Lastly, if there is a flap or graft type which we do not have you need to let us know right away so I can add it in before the variable is hidden. No need to panic, we plan to give you roughly 6 months to make the change.
Dressing: dry sterile dressing
Bilobed Flap Text: The defect edges were debeveled with a #15 scalpel blade.  Given the location of the defect and the proximity to free margins a bilobe flap was deemed most appropriate.  Using a sterile surgical marker, an appropriate bilobe flap drawn around the defect.    The area thus outlined was incised deep to adipose tissue with a #15 scalpel blade.  The skin margins were undermined to an appropriate distance in all directions utilizing iris scissors.
Xenograft Text: The defect edges were debeveled with a #15 scalpel blade.  Given the location of the defect, shape of the defect and the proximity to free margins a xenograft was deemed most appropriate.  The graft was then trimmed to fit the size of the defect.  The graft was then placed in the primary defect and oriented appropriately.
Initial Size Of Lesion: 0.4
Banner Transposition Flap Text: The defect edges were debeveled with a #15 scalpel blade.  Given the location of the defect and the proximity to free margins a Banner transposition flap was deemed most appropriate.  Using a sterile surgical marker, an appropriate flap drawn around the defect. The area thus outlined was incised deep to adipose tissue with a #15 scalpel blade.  The skin margins were undermined to an appropriate distance in all directions utilizing iris scissors.
Purse String (Simple) Text: Given the location of the defect and the characteristics of the surrounding skin a purse string closure was deemed most appropriate.  Undermining was performed circumfirentially around the surgical defect.  A purse string suture was then placed and tightened.
X Size Of Lesion In Cm (Optional): 0.3
Surgeon/Pathologist Verbiage (Will Incorporate Name Of Surgeon From Intro If Not Blank): operated in two distinct and integrated capacities as the surgeon and pathologist.
Area L Indication Text: Tumors in this location are included in Area L (trunk and extremities).  Mohs surgery is indicated for larger tumors, or tumors with aggressive histologic features, in these anatomic locations.
Estimated Blood Loss (Cc): less than 5 cc
No Residual Tumor Seen Histology Text: There were no malignant cells seen in the sections examined.
Alternatives Discussed Intro (Do Not Add Period): I discussed alternative treatments to Mohs surgery and specifically discussed the risks and benefits of
Melolabial Interpolation Flap Text: A decision was made to reconstruct the defect utilizing an interpolation axial flap and a staged reconstruction.  A telfa template was made of the defect.  This telfa template was then used to outline the melolabial interpolation flap.  The donor area for the pedicle flap was then injected with anesthesia.  The flap was excised through the skin and subcutaneous tissue down to the layer of the underlying musculature.  The pedicle flap was carefully excised within this deep plane to maintain its blood supply.  The edges of the donor site were undermined.   The donor site was closed in a primary fashion.  The pedicle was then rotated into position and sutured.  Once the tube was sutured into place, adequate blood supply was confirmed with blanching and refill.  The pedicle was then wrapped with xeroform gauze and dressed appropriately with a telfa and gauze bandage to ensure continued blood supply and protect the attached pedicle.
Deep Sutures: 5-0 Vicryl
Graft Cartilage Fenestration Text: The cartilage was fenestrated with a 2mm punch biopsy to help facilitate graft survival and healing.
Bi-Rhombic Flap Text: The defect edges were debeveled with a #15 scalpel blade.  Given the location of the defect and the proximity to free margins a bi-rhombic flap was deemed most appropriate.  Using a sterile surgical marker, an appropriate rhombic flap was drawn incorporating the defect. The area thus outlined was incised deep to adipose tissue with a #15 scalpel blade.  The skin margins were undermined to an appropriate distance in all directions utilizing iris scissors.
Localized Dermabrasion With Wire Brush Text: The patient was draped in routine manner.  Localized dermabrasion using 3 x 17 mm wire brush was performed in routine manner to papillary dermis. This spot dermabrasion is being performed to complete skin cancer reconstruction. It also will eliminate the other sun damaged precancerous cells that are known to be part of the regional effect of a lifetime's worth of sun exposure. This localized dermabrasion is therapeutic and should not be considered cosmetic in any regard.
Island Pedicle Flap Text: The defect edges were debeveled with a #15 scalpel blade.  Given the location of the defect, shape of the defect and the proximity to free margins an island pedicle advancement flap was deemed most appropriate.  Using a sterile surgical marker, an appropriate advancement flap was drawn incorporating the defect, outlining the appropriate donor tissue and placing the expected incisions within the relaxed skin tension lines where possible.    The area thus outlined was incised deep to adipose tissue with a #15 scalpel blade.  The skin margins were undermined to an appropriate distance in all directions around the primary defect and laterally outward around the island pedicle utilizing iris scissors.  There was minimal undermining beneath the pedicle flap.
O-Z Flap Text: The defect edges were debeveled with a #15 scalpel blade.  Given the location of the defect, shape of the defect and the proximity to free margins an O-Z flap was deemed most appropriate.  Using a sterile surgical marker, an appropriate transposition flap was drawn incorporating the defect and placing the expected incisions within the relaxed skin tension lines where possible. The area thus outlined was incised deep to adipose tissue with a #15 scalpel blade.  The skin margins were undermined to an appropriate distance in all directions utilizing iris scissors.
Consent (Temporal Branch)/Introductory Paragraph: The rationale for Mohs was explained to the patient and consent was obtained. The risks, benefits and alternatives to therapy were discussed in detail. Specifically, the risks of damage to the temporal branch of the facial nerve, infection, scarring, bleeding, prolonged wound healing, incomplete removal, allergy to anesthesia, and recurrence were addressed. Prior to the procedure, the treatment site was clearly identified and confirmed by the patient. All components of Universal Protocol/PAUSE Rule completed.
Cheiloplasty (Less Than 50%) Text: A decision was made to reconstruct the defect with a  cheiloplasty.  The defect was undermined extensively.  Additional obicularis oris muscle was excised with a 15 blade scalpel.  The defect was converted into a full thickness wedge, of less than 50% of the vertical height of the lip, to facilite a better cosmetic result.  Small vessels were then tied off with 5-0 monocyrl. The obicularis oris, superficial fascia, adipose and dermis were then reapproximated.  After the deeper layers were approximated the epidermis was reapproximated with particular care given to realign the vermilion border.
Muscle Hinge Flap Text: The defect edges were debeveled with a #15 scalpel blade.  Given the size, depth and location of the defect and the proximity to free margins a muscle hinge flap was deemed most appropriate.  Using a sterile surgical marker, an appropriate hinge flap was drawn incorporating the defect. The area thus outlined was incised with a #15 scalpel blade.  The skin margins were undermined to an appropriate distance in all directions utilizing iris scissors.
Unique Flap 1 Name: Myocutaneous Island pedicle Flap
Island Pedicle Flap-Requiring Vessel Identification Text: The defect edges were debeveled with a #15 scalpel blade.  Given the location of the defect, shape of the defect and the proximity to free margins an island pedicle advancement flap was deemed most appropriate.  Using a sterile surgical marker, an appropriate advancement flap was drawn, based on the axial vessel mentioned above, incorporating the defect, outlining the appropriate donor tissue and placing the expected incisions within the relaxed skin tension lines where possible.    The area thus outlined was incised deep to adipose tissue with a #15 scalpel blade.  The skin margins were undermined to an appropriate distance in all directions around the primary defect and laterally outward around the island pedicle utilizing iris scissors.  There was minimal undermining beneath the pedicle flap.

## 2019-12-02 ENCOUNTER — APPOINTMENT (RX ONLY)
Dept: URBAN - METROPOLITAN AREA CLINIC 36 | Facility: CLINIC | Age: 84
Setting detail: DERMATOLOGY
End: 2019-12-02

## 2019-12-02 DIAGNOSIS — Z48.02 ENCOUNTER FOR REMOVAL OF SUTURES: ICD-10-CM

## 2019-12-02 PROCEDURE — ? SUTURE REMOVAL (GLOBAL PERIOD)

## 2019-12-02 PROCEDURE — 99024 POSTOP FOLLOW-UP VISIT: CPT

## 2019-12-02 ASSESSMENT — LOCATION SIMPLE DESCRIPTION DERM: LOCATION SIMPLE: RIGHT CHEEK

## 2019-12-02 ASSESSMENT — LOCATION DETAILED DESCRIPTION DERM: LOCATION DETAILED: RIGHT MEDIAL MALAR CHEEK

## 2019-12-02 ASSESSMENT — LOCATION ZONE DERM: LOCATION ZONE: FACE

## 2019-12-02 NOTE — PROCEDURE: SUTURE REMOVAL (GLOBAL PERIOD)
Detail Level: Detailed
Add 24527 Cpt? (Important Note: In 2017 The Use Of 74078 Is Being Tracked By Cms To Determine Future Global Period Reimbursement For Global Periods): yes

## 2019-12-18 ENCOUNTER — OFFICE VISIT (OUTPATIENT)
Dept: MEDICAL GROUP | Facility: PHYSICIAN GROUP | Age: 84
End: 2019-12-18
Payer: MEDICARE

## 2019-12-18 VITALS
HEART RATE: 79 BPM | TEMPERATURE: 97.9 F | SYSTOLIC BLOOD PRESSURE: 132 MMHG | OXYGEN SATURATION: 94 % | BODY MASS INDEX: 20.96 KG/M2 | HEIGHT: 61 IN | WEIGHT: 111 LBS | DIASTOLIC BLOOD PRESSURE: 70 MMHG

## 2019-12-18 DIAGNOSIS — M54.2 NECK PAIN: ICD-10-CM

## 2019-12-18 DIAGNOSIS — Z23 NEED FOR VACCINATION: ICD-10-CM

## 2019-12-18 PROCEDURE — 99214 OFFICE O/P EST MOD 30 MIN: CPT | Mod: 25 | Performed by: FAMILY MEDICINE

## 2019-12-18 PROCEDURE — G0008 ADMIN INFLUENZA VIRUS VAC: HCPCS | Performed by: FAMILY MEDICINE

## 2019-12-18 PROCEDURE — 90662 IIV NO PRSV INCREASED AG IM: CPT | Performed by: FAMILY MEDICINE

## 2019-12-18 RX ORDER — METHOCARBAMOL 750 MG/1
375-750 TABLET, FILM COATED ORAL 4 TIMES DAILY
Qty: 30 TAB | Refills: 1 | Status: SHIPPED
Start: 2019-12-18 | End: 2020-01-21
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
## 2020-01-15 ENCOUNTER — APPOINTMENT (RX ONLY)
Dept: URBAN - METROPOLITAN AREA CLINIC 36 | Facility: CLINIC | Age: 85
Setting detail: DERMATOLOGY
End: 2020-01-15

## 2020-01-15 DIAGNOSIS — Z48.817 ENCOUNTER FOR SURGICAL AFTERCARE FOLLOWING SURGERY ON THE SKIN AND SUBCUTANEOUS TISSUE: ICD-10-CM

## 2020-01-15 PROCEDURE — 99024 POSTOP FOLLOW-UP VISIT: CPT

## 2020-01-15 PROCEDURE — ? POST-OP WOUND CHECK

## 2020-01-15 ASSESSMENT — LOCATION ZONE DERM: LOCATION ZONE: FACE

## 2020-01-15 ASSESSMENT — LOCATION SIMPLE DESCRIPTION DERM: LOCATION SIMPLE: LEFT FOREHEAD

## 2020-01-15 ASSESSMENT — LOCATION DETAILED DESCRIPTION DERM: LOCATION DETAILED: LEFT INFERIOR MEDIAL FOREHEAD

## 2020-01-15 NOTE — PROCEDURE: POST-OP WOUND CHECK
Add 70082 Cpt? (Important Note: In 2017 The Use Of 53414 Is Being Tracked By Cms To Determine Future Global Period Reimbursement For Global Periods): yes
Additional Comments: Patient to follow up with PMD and PRN with me
Detail Level: Generalized

## 2020-01-17 ENCOUNTER — OFFICE VISIT (OUTPATIENT)
Dept: URGENT CARE | Facility: PHYSICIAN GROUP | Age: 85
End: 2020-01-17
Payer: MEDICARE

## 2020-01-17 ENCOUNTER — APPOINTMENT (OUTPATIENT)
Dept: RADIOLOGY | Facility: IMAGING CENTER | Age: 85
End: 2020-01-17
Attending: NURSE PRACTITIONER
Payer: MEDICARE

## 2020-01-17 VITALS
OXYGEN SATURATION: 95 % | HEIGHT: 61 IN | RESPIRATION RATE: 16 BRPM | SYSTOLIC BLOOD PRESSURE: 132 MMHG | TEMPERATURE: 99.1 F | DIASTOLIC BLOOD PRESSURE: 74 MMHG | HEART RATE: 88 BPM | BODY MASS INDEX: 20.84 KG/M2 | WEIGHT: 110.4 LBS

## 2020-01-17 DIAGNOSIS — M54.2 NECK PAIN: ICD-10-CM

## 2020-01-17 PROCEDURE — 72040 X-RAY EXAM NECK SPINE 2-3 VW: CPT | Mod: TC | Performed by: NURSE PRACTITIONER

## 2020-01-17 PROCEDURE — 99213 OFFICE O/P EST LOW 20 MIN: CPT | Performed by: NURSE PRACTITIONER

## 2020-01-17 RX ORDER — METHYLPREDNISOLONE 4 MG/1
TABLET ORAL
Qty: 21 TAB | Refills: 0 | Status: SHIPPED | OUTPATIENT
Start: 2020-01-17 | End: 2020-11-19

## 2020-01-17 ASSESSMENT — ENCOUNTER SYMPTOMS
PAIN: 1
NECK PAIN: 1
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
## 2020-01-21 ENCOUNTER — TELEPHONE (OUTPATIENT)
Dept: MEDICAL GROUP | Facility: PHYSICIAN GROUP | Age: 85
End: 2020-01-21

## 2020-01-21 ENCOUNTER — OFFICE VISIT (OUTPATIENT)
Dept: MEDICAL GROUP | Facility: PHYSICIAN GROUP | Age: 85
End: 2020-01-21
Payer: MEDICARE

## 2020-01-21 VITALS
SYSTOLIC BLOOD PRESSURE: 142 MMHG | OXYGEN SATURATION: 93 % | HEART RATE: 86 BPM | HEIGHT: 61 IN | WEIGHT: 109 LBS | TEMPERATURE: 98.2 F | DIASTOLIC BLOOD PRESSURE: 86 MMHG | BODY MASS INDEX: 20.58 KG/M2

## 2020-01-21 DIAGNOSIS — M47.812 CERVICAL SPINE ARTHRITIS: ICD-10-CM

## 2020-01-21 PROCEDURE — 99214 OFFICE O/P EST MOD 30 MIN: CPT | Performed by: FAMILY MEDICINE

## 2020-01-21 RX ORDER — LIDOCAINE 50 MG/G
1 PATCH TOPICAL EVERY 24 HOURS
Qty: 30 PATCH | Refills: 3 | Status: SHIPPED | OUTPATIENT
Start: 2020-01-21 | End: 2020-11-19

## 2020-01-21 ASSESSMENT — PATIENT HEALTH QUESTIONNAIRE - PHQ9: CLINICAL INTERPRETATION OF PHQ2 SCORE: 0
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
## 2020-01-22 NOTE — TELEPHONE ENCOUNTER
Per [facility] pharmacy, they are requesting the number of grams per application that patient should use. Please update and resend. Thank you.

## 2020-02-25 ENCOUNTER — APPOINTMENT (OUTPATIENT)
Dept: PHYSICAL MEDICINE AND REHAB | Facility: MEDICAL CENTER | Age: 85
End: 2020-02-25
Payer: MEDICARE

## 2020-03-20 ENCOUNTER — HOSPITAL ENCOUNTER (OUTPATIENT)
Dept: LAB | Facility: MEDICAL CENTER | Age: 85
End: 2020-03-20
Attending: INTERNAL MEDICINE
Payer: MEDICARE

## 2020-03-20 LAB
25(OH)D3 SERPL-MCNC: 51 NG/ML (ref 30–100)
ALBUMIN SERPL BCP-MCNC: 4.5 G/DL (ref 3.2–4.9)
ALBUMIN/GLOB SERPL: 1.5 G/DL
ALP SERPL-CCNC: 64 U/L (ref 30–99)
ALT SERPL-CCNC: 14 U/L (ref 2–50)
ANION GAP SERPL CALC-SCNC: 12 MMOL/L (ref 7–16)
APPEARANCE UR: CLEAR
AST SERPL-CCNC: 23 U/L (ref 12–45)
BACTERIA #/AREA URNS HPF: NEGATIVE /HPF
BASOPHILS # BLD AUTO: 0.9 % (ref 0–1.8)
BASOPHILS # BLD: 0.03 K/UL (ref 0–0.12)
BILIRUB SERPL-MCNC: 0.8 MG/DL (ref 0.1–1.5)
BILIRUB UR QL STRIP.AUTO: NEGATIVE
BUN SERPL-MCNC: 19 MG/DL (ref 8–22)
CALCIUM SERPL-MCNC: 9.8 MG/DL (ref 8.5–10.5)
CHLORIDE SERPL-SCNC: 102 MMOL/L (ref 96–112)
CHOLEST SERPL-MCNC: 177 MG/DL (ref 100–199)
CO2 SERPL-SCNC: 24 MMOL/L (ref 20–33)
COLOR UR: YELLOW
CREAT SERPL-MCNC: 1.22 MG/DL (ref 0.5–1.4)
CREAT UR-MCNC: 37.4 MG/DL
EOSINOPHIL # BLD AUTO: 0.21 K/UL (ref 0–0.51)
EOSINOPHIL NFR BLD: 6.3 % (ref 0–6.9)
EPI CELLS #/AREA URNS HPF: NEGATIVE /HPF
ERYTHROCYTE [DISTWIDTH] IN BLOOD BY AUTOMATED COUNT: 41.5 FL (ref 35.9–50)
FERRITIN SERPL-MCNC: 110 NG/ML (ref 10–291)
GLOBULIN SER CALC-MCNC: 3.1 G/DL (ref 1.9–3.5)
GLUCOSE SERPL-MCNC: 91 MG/DL (ref 65–99)
GLUCOSE UR STRIP.AUTO-MCNC: NEGATIVE MG/DL
HCT VFR BLD AUTO: 42.3 % (ref 37–47)
HDLC SERPL-MCNC: 50 MG/DL
HGB BLD-MCNC: 14 G/DL (ref 12–16)
HYALINE CASTS #/AREA URNS LPF: NORMAL /LPF
IMM GRANULOCYTES # BLD AUTO: 0 K/UL (ref 0–0.11)
IMM GRANULOCYTES NFR BLD AUTO: 0 % (ref 0–0.9)
IRON SATN MFR SERPL: 21 % (ref 15–55)
IRON SERPL-MCNC: 67 UG/DL (ref 40–170)
KETONES UR STRIP.AUTO-MCNC: NEGATIVE MG/DL
LDLC SERPL CALC-MCNC: 99 MG/DL
LEUKOCYTE ESTERASE UR QL STRIP.AUTO: NEGATIVE
LYMPHOCYTES # BLD AUTO: 0.9 K/UL (ref 1–4.8)
LYMPHOCYTES NFR BLD: 27.2 % (ref 22–41)
MAGNESIUM SERPL-MCNC: 2.1 MG/DL (ref 1.5–2.5)
MCH RBC QN AUTO: 29 PG (ref 27–33)
MCHC RBC AUTO-ENTMCNC: 33.1 G/DL (ref 33.6–35)
MCV RBC AUTO: 87.6 FL (ref 81.4–97.8)
MICRO URNS: ABNORMAL
MONOCYTES # BLD AUTO: 0.35 K/UL (ref 0–0.85)
MONOCYTES NFR BLD AUTO: 10.6 % (ref 0–13.4)
NEUTROPHILS # BLD AUTO: 1.82 K/UL (ref 2–7.15)
NEUTROPHILS NFR BLD: 55 % (ref 44–72)
NITRITE UR QL STRIP.AUTO: NEGATIVE
NRBC # BLD AUTO: 0 K/UL
NRBC BLD-RTO: 0 /100 WBC
PH UR STRIP.AUTO: 5.5 [PH] (ref 5–8)
PHOSPHATE SERPL-MCNC: 3.5 MG/DL (ref 2.5–4.5)
PLATELET # BLD AUTO: 201 K/UL (ref 164–446)
PMV BLD AUTO: 9.5 FL (ref 9–12.9)
POTASSIUM SERPL-SCNC: 4.9 MMOL/L (ref 3.6–5.5)
PROT SERPL-MCNC: 7.6 G/DL (ref 6–8.2)
PROT UR QL STRIP: 30 MG/DL
PROT UR-MCNC: 28 MG/DL (ref 0–15)
PROT/CREAT UR: 749 MG/G (ref 10–107)
PTH-INTACT SERPL-MCNC: 33.5 PG/ML (ref 14–72)
RBC # BLD AUTO: 4.83 M/UL (ref 4.2–5.4)
RBC # URNS HPF: NORMAL /HPF
RBC UR QL AUTO: NEGATIVE
SODIUM SERPL-SCNC: 138 MMOL/L (ref 135–145)
SP GR UR STRIP.AUTO: 1.01
TIBC SERPL-MCNC: 317 UG/DL (ref 250–450)
TRIGL SERPL-MCNC: 138 MG/DL (ref 0–149)
UIBC SERPL-MCNC: 250 UG/DL (ref 110–370)
URATE SERPL-MCNC: 6.2 MG/DL (ref 1.9–8.2)
UROBILINOGEN UR STRIP.AUTO-MCNC: 0.2 MG/DL
WBC # BLD AUTO: 3.3 K/UL (ref 4.8–10.8)
WBC #/AREA URNS HPF: NORMAL /HPF

## 2020-03-20 PROCEDURE — 82728 ASSAY OF FERRITIN: CPT

## 2020-03-20 PROCEDURE — 84156 ASSAY OF PROTEIN URINE: CPT

## 2020-03-20 PROCEDURE — 83970 ASSAY OF PARATHORMONE: CPT

## 2020-03-20 PROCEDURE — 82306 VITAMIN D 25 HYDROXY: CPT

## 2020-03-20 PROCEDURE — 84550 ASSAY OF BLOOD/URIC ACID: CPT

## 2020-03-20 PROCEDURE — 80061 LIPID PANEL: CPT

## 2020-03-20 PROCEDURE — 83550 IRON BINDING TEST: CPT

## 2020-03-20 PROCEDURE — 84100 ASSAY OF PHOSPHORUS: CPT

## 2020-03-20 PROCEDURE — 85025 COMPLETE CBC W/AUTO DIFF WBC: CPT

## 2020-03-20 PROCEDURE — 83735 ASSAY OF MAGNESIUM: CPT

## 2020-03-20 PROCEDURE — 83540 ASSAY OF IRON: CPT

## 2020-03-20 PROCEDURE — 81001 URINALYSIS AUTO W/SCOPE: CPT

## 2020-03-20 PROCEDURE — 36415 COLL VENOUS BLD VENIPUNCTURE: CPT

## 2020-03-20 PROCEDURE — 82570 ASSAY OF URINE CREATININE: CPT

## 2020-03-20 PROCEDURE — 80053 COMPREHEN METABOLIC PANEL: CPT

## 2020-05-26 ENCOUNTER — PATIENT OUTREACH (OUTPATIENT)
Dept: HEALTH INFORMATION MANAGEMENT | Facility: OTHER | Age: 85
End: 2020-05-26

## 2020-05-26 NOTE — PROGRESS NOTES
Called patient to schedule for AWV.    Outcome:   Patient declined to schedule appt at this time. Advised she was doing good and does not take any medications.  Confirmed no resp. Symptoms and no exposure to COVID 19.  If patient calls back, Please transfer to Patient Outreach Team at [phone].    Attempt #1    -aep

## 2020-11-04 ENCOUNTER — APPOINTMENT (OUTPATIENT)
Dept: RADIOLOGY | Facility: MEDICAL CENTER | Age: 85
End: 2020-11-04
Attending: EMERGENCY MEDICINE
Payer: MEDICARE

## 2020-11-04 ENCOUNTER — HOSPITAL ENCOUNTER (EMERGENCY)
Facility: MEDICAL CENTER | Age: 85
End: 2020-11-04
Attending: EMERGENCY MEDICINE
Payer: MEDICARE

## 2020-11-04 VITALS
BODY MASS INDEX: 19.83 KG/M2 | TEMPERATURE: 97.4 F | DIASTOLIC BLOOD PRESSURE: 77 MMHG | HEIGHT: 61 IN | RESPIRATION RATE: 17 BRPM | WEIGHT: 105 LBS | SYSTOLIC BLOOD PRESSURE: 160 MMHG | OXYGEN SATURATION: 97 % | HEART RATE: 73 BPM

## 2020-11-04 DIAGNOSIS — M54.50 ACUTE MIDLINE LOW BACK PAIN WITHOUT SCIATICA: ICD-10-CM

## 2020-11-04 DIAGNOSIS — U07.1 COVID-19: ICD-10-CM

## 2020-11-04 LAB
ALBUMIN SERPL BCP-MCNC: 3.6 G/DL (ref 3.2–4.9)
ALBUMIN/GLOB SERPL: 1.2 G/DL
ALP SERPL-CCNC: 38 U/L (ref 30–99)
ALT SERPL-CCNC: 14 U/L (ref 2–50)
ANION GAP SERPL CALC-SCNC: 12 MMOL/L (ref 7–16)
AST SERPL-CCNC: 25 U/L (ref 12–45)
BASOPHILS # BLD AUTO: 0 % (ref 0–1.8)
BASOPHILS # BLD: 0 K/UL (ref 0–0.12)
BILIRUB SERPL-MCNC: 0.4 MG/DL (ref 0.1–1.5)
BUN SERPL-MCNC: 17 MG/DL (ref 8–22)
CALCIUM SERPL-MCNC: 9.2 MG/DL (ref 8.5–10.5)
CHLORIDE SERPL-SCNC: 100 MMOL/L (ref 96–112)
CO2 SERPL-SCNC: 22 MMOL/L (ref 20–33)
COVID ORDER STATUS COVID19: NORMAL
CREAT SERPL-MCNC: 1.04 MG/DL (ref 0.5–1.4)
EOSINOPHIL # BLD AUTO: 0 K/UL (ref 0–0.51)
EOSINOPHIL NFR BLD: 0 % (ref 0–6.9)
ERYTHROCYTE [DISTWIDTH] IN BLOOD BY AUTOMATED COUNT: 43.4 FL (ref 35.9–50)
GLOBULIN SER CALC-MCNC: 3 G/DL (ref 1.9–3.5)
GLUCOSE SERPL-MCNC: 104 MG/DL (ref 65–99)
HCT VFR BLD AUTO: 41.4 % (ref 37–47)
HGB BLD-MCNC: 13.5 G/DL (ref 12–16)
LIPASE SERPL-CCNC: 57 U/L (ref 11–82)
LYMPHOCYTES # BLD AUTO: 0.39 K/UL (ref 1–4.8)
LYMPHOCYTES NFR BLD: 13.9 % (ref 22–41)
MANUAL DIFF BLD: NORMAL
MCH RBC QN AUTO: 29 PG (ref 27–33)
MCHC RBC AUTO-ENTMCNC: 32.6 G/DL (ref 33.6–35)
MCV RBC AUTO: 89 FL (ref 81.4–97.8)
METAMYELOCYTES NFR BLD MANUAL: 0.9 %
MONOCYTES # BLD AUTO: 0.12 K/UL (ref 0–0.85)
MONOCYTES NFR BLD AUTO: 4.3 % (ref 0–13.4)
MORPHOLOGY BLD-IMP: NORMAL
MYELOCYTES NFR BLD MANUAL: 0.9 %
NEUTROPHILS # BLD AUTO: 2.24 K/UL (ref 2–7.15)
NEUTROPHILS NFR BLD: 80 % (ref 44–72)
NRBC # BLD AUTO: 0 K/UL
NRBC BLD-RTO: 0 /100 WBC
PLATELET # BLD AUTO: 200 K/UL (ref 164–446)
PLATELET BLD QL SMEAR: NORMAL
PMV BLD AUTO: 9 FL (ref 9–12.9)
POTASSIUM SERPL-SCNC: 4 MMOL/L (ref 3.6–5.5)
PROT SERPL-MCNC: 6.6 G/DL (ref 6–8.2)
RBC # BLD AUTO: 4.65 M/UL (ref 4.2–5.4)
RBC BLD AUTO: NORMAL
SARS-COV-2 RNA RESP QL NAA+PROBE: DETECTED
SODIUM SERPL-SCNC: 134 MMOL/L (ref 135–145)
SPECIMEN SOURCE: ABNORMAL
WBC # BLD AUTO: 2.8 K/UL (ref 4.8–10.8)

## 2020-11-04 PROCEDURE — 80053 COMPREHEN METABOLIC PANEL: CPT

## 2020-11-04 PROCEDURE — 700117 HCHG RX CONTRAST REV CODE 255: Performed by: EMERGENCY MEDICINE

## 2020-11-04 PROCEDURE — 83690 ASSAY OF LIPASE: CPT

## 2020-11-04 PROCEDURE — C9803 HOPD COVID-19 SPEC COLLECT: HCPCS | Performed by: EMERGENCY MEDICINE

## 2020-11-04 PROCEDURE — 85007 BL SMEAR W/DIFF WBC COUNT: CPT

## 2020-11-04 PROCEDURE — 85027 COMPLETE CBC AUTOMATED: CPT

## 2020-11-04 PROCEDURE — 72131 CT LUMBAR SPINE W/O DYE: CPT

## 2020-11-04 PROCEDURE — 71045 X-RAY EXAM CHEST 1 VIEW: CPT

## 2020-11-04 PROCEDURE — 99284 EMERGENCY DEPT VISIT MOD MDM: CPT

## 2020-11-04 PROCEDURE — U0003 INFECTIOUS AGENT DETECTION BY NUCLEIC ACID (DNA OR RNA); SEVERE ACUTE RESPIRATORY SYNDROME CORONAVIRUS 2 (SARS-COV-2) (CORONAVIRUS DISEASE [COVID-19]), AMPLIFIED PROBE TECHNIQUE, MAKING USE OF HIGH THROUGHPUT TECHNOLOGIES AS DESCRIBED BY CMS-2020-01-R: HCPCS

## 2020-11-04 PROCEDURE — 74177 CT ABD & PELVIS W/CONTRAST: CPT

## 2020-11-04 RX ADMIN — IOHEXOL 70 ML: 350 INJECTION, SOLUTION INTRAVENOUS at 13:04

## 2020-11-04 ASSESSMENT — FIBROSIS 4 INDEX: FIB4 SCORE: 2.69

## 2020-11-04 NOTE — LETTER
11/5/2020               Lela Orozco  [address]  [place] [address]        Dear Lela (MR#[id number]),    This letter is to inform you that your COVID-19 test result is POSITIVE.  This means that the virus that causes COVID-19 was found in your sample.      The Health Department will be in contact with you soon.      You are encouraged to continue to quarantine and self-isolate according to the CDC guidance unless otherwise directed by the Health Department.  Per the CDC, you should continue to quarantine until at least 10 days have passed since your symptoms first appeared and at least 24 hours have passed since your fever resolved without the use of fever-reducing medications. Your other symptoms of COVID-19 should also be improving (loss of taste and smell may persist for weeks or months after recovery and should not delay the end of isolation).    Once any symptoms have resolved, it may be possible to donate plasma to help others that are currently ill with COVID-19. To learn more and apply, please contact the  at [phone] or via e-mail at [email or web address].    For any further questions regarding COVID-19, please contact the [facility] at [phone].  Thank you for your cooperation in the matter.      Sincerely,      The [facility] Care Team

## 2020-11-04 NOTE — ED NOTES
Patient discharged in stable condition per orders. IV access removed - bandage applied. Patient verbalized understanding of all discharge instructions. All belongings accounted for. Patient to lobby via wheelchair accompanied by ED Tech.
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
## 2020-11-04 NOTE — ED NOTES
Spoke via telephone with Larissa Mendiola. Grandson called to  patient - states that he will be here within the hour to pick her up for a safe discharge ride home.

## 2020-11-04 NOTE — ED TRIAGE NOTES
Chief Complaint   Patient presents with   • Epigastric Pain     ealier this morning but pt denies any pain at this time   • Back Pain     per EMS     Pt brought in by EMS from home for above complaint. Pt has dementia and has been home alone the last couple weeks as her  currently admitted here at [facility] for COVID. Pt's grandson called to check on her and she complained of epigastric pain but when EMS arrived pt denies epigastric pain and c/o pain in her back. According to grandson pt tested positive for COVID possibly on Saturday. Pt denies any respiratory complaints at this time and breaths are even and unlabored.

## 2020-11-05 NOTE — ED NOTES
COVID-19 Test Follow-Up  11/05/20    Patient is positive for COVID-19.      SARS-CoV-2 Source   Date Value Ref Range Status   11/04/2020 NP Swab  Final     SARS-CoV-2 by PCR   Date Value Ref Range Status   11/04/2020 DETECTED (AA)  Final     Comment:     **The Roche SARS-CoV-2 PCR test has been made available for use under the  Emergency Use Authorization (EUA) only.       I have informed the patient of the positive result by letter (left v/m for callback) and that the Health Dept would be in contact soon. Instructed them to continue to quarantine and self-isolate according with the CDC guidance or as otherwise directed by the Health Dept.    Per the CDC, they should continue to self-isolate until:   • At least 10 days since symptoms first appeared and  • At least 24 hours with no fever without fever-reducing medication and  • Other symptoms of COVID-19 are improving (Loss of taste and smell may persist for weeks or months after recovery and need not delay the end of isolation)    They are advised to return to the ER for worsening symptoms including difficulty breathing, ongoing fever, weakness or chest pain.    Alejandro Orozco, PharmD

## 2020-11-10 ENCOUNTER — HOSPITAL ENCOUNTER (EMERGENCY)
Facility: MEDICAL CENTER | Age: 85
End: 2020-11-10
Payer: MEDICARE

## 2020-11-10 VITALS
SYSTOLIC BLOOD PRESSURE: 140 MMHG | BODY MASS INDEX: 18.61 KG/M2 | HEART RATE: 111 BPM | TEMPERATURE: 97.9 F | WEIGHT: 105 LBS | RESPIRATION RATE: 15 BRPM | DIASTOLIC BLOOD PRESSURE: 83 MMHG | OXYGEN SATURATION: 92 % | HEIGHT: 63 IN

## 2020-11-10 LAB
ALBUMIN SERPL BCP-MCNC: 4.3 G/DL (ref 3.2–4.9)
ALBUMIN/GLOB SERPL: 1.4 G/DL
ALP SERPL-CCNC: 49 U/L (ref 30–99)
ALT SERPL-CCNC: 16 U/L (ref 2–50)
ANION GAP SERPL CALC-SCNC: 14 MMOL/L (ref 7–16)
AST SERPL-CCNC: 21 U/L (ref 12–45)
BASOPHILS # BLD AUTO: 0.2 % (ref 0–1.8)
BASOPHILS # BLD: 0.01 K/UL (ref 0–0.12)
BILIRUB SERPL-MCNC: 0.9 MG/DL (ref 0.1–1.5)
BUN SERPL-MCNC: 23 MG/DL (ref 8–22)
CALCIUM SERPL-MCNC: 10.2 MG/DL (ref 8.5–10.5)
CHLORIDE SERPL-SCNC: 94 MMOL/L (ref 96–112)
CO2 SERPL-SCNC: 22 MMOL/L (ref 20–33)
CREAT SERPL-MCNC: 1.03 MG/DL (ref 0.5–1.4)
EOSINOPHIL # BLD AUTO: 0.02 K/UL (ref 0–0.51)
EOSINOPHIL NFR BLD: 0.4 % (ref 0–6.9)
ERYTHROCYTE [DISTWIDTH] IN BLOOD BY AUTOMATED COUNT: 41.9 FL (ref 35.9–50)
GLOBULIN SER CALC-MCNC: 3.1 G/DL (ref 1.9–3.5)
GLUCOSE SERPL-MCNC: 156 MG/DL (ref 65–99)
HCT VFR BLD AUTO: 44.6 % (ref 37–47)
HGB BLD-MCNC: 14.8 G/DL (ref 12–16)
IMM GRANULOCYTES # BLD AUTO: 0.02 K/UL (ref 0–0.11)
IMM GRANULOCYTES NFR BLD AUTO: 0.4 % (ref 0–0.9)
LIPASE SERPL-CCNC: 77 U/L (ref 11–82)
LYMPHOCYTES # BLD AUTO: 0.68 K/UL (ref 1–4.8)
LYMPHOCYTES NFR BLD: 12.4 % (ref 22–41)
MCH RBC QN AUTO: 29.1 PG (ref 27–33)
MCHC RBC AUTO-ENTMCNC: 33.2 G/DL (ref 33.6–35)
MCV RBC AUTO: 87.6 FL (ref 81.4–97.8)
MONOCYTES # BLD AUTO: 0.42 K/UL (ref 0–0.85)
MONOCYTES NFR BLD AUTO: 7.6 % (ref 0–13.4)
NEUTROPHILS # BLD AUTO: 4.35 K/UL (ref 2–7.15)
NEUTROPHILS NFR BLD: 79 % (ref 44–72)
NRBC # BLD AUTO: 0 K/UL
NRBC BLD-RTO: 0 /100 WBC
PLATELET # BLD AUTO: 451 K/UL (ref 164–446)
PMV BLD AUTO: 8.9 FL (ref 9–12.9)
POTASSIUM SERPL-SCNC: 4.2 MMOL/L (ref 3.6–5.5)
PROT SERPL-MCNC: 7.4 G/DL (ref 6–8.2)
RBC # BLD AUTO: 5.09 M/UL (ref 4.2–5.4)
SODIUM SERPL-SCNC: 130 MMOL/L (ref 135–145)
WBC # BLD AUTO: 5.5 K/UL (ref 4.8–10.8)

## 2020-11-10 PROCEDURE — 83690 ASSAY OF LIPASE: CPT

## 2020-11-10 PROCEDURE — 80053 COMPREHEN METABOLIC PANEL: CPT

## 2020-11-10 PROCEDURE — 36415 COLL VENOUS BLD VENIPUNCTURE: CPT

## 2020-11-10 PROCEDURE — 302449 STATCHG TRIAGE ONLY (STATISTIC)

## 2020-11-10 PROCEDURE — 85025 COMPLETE CBC W/AUTO DIFF WBC: CPT

## 2020-11-10 ASSESSMENT — FIBROSIS 4 INDEX: FIB4 SCORE: 2.939873661036668231

## 2020-11-10 NOTE — ED NOTES
Pt called for room placement in ER lobby and senior lounge. Unable to locate patient. Will try again.

## 2020-11-10 NOTE — ED NOTES
Pt requested to leave the ED, daughter in front lobby able to drive the patient home. The risks of signing out before being seen by a doctor were discussed. Pt encouraged to call 911 or return to the ED for nay new/concerning symptoms. ER AMA/REFUSAL form signed and filed behind FD.

## 2020-11-10 NOTE — ED TRIAGE NOTES
Chief Complaint   Patient presents with   • Abdominal Pain     in mid abdomen x 2-4 weeks. denies n/v/d. denies dysuria     Per family member pt tested positive for covid 10/25. Placed in senior lounge room.

## 2020-11-11 NOTE — ED NOTES
Unable to locate patient in senior lounge or ER lobby x2 attempts. Per ER Kiran alejandra pt signed out with ashleyer earlier this afternoon.

## 2020-11-19 ENCOUNTER — HOSPITAL ENCOUNTER (OUTPATIENT)
Facility: MEDICAL CENTER | Age: 85
End: 2020-11-19
Attending: PHYSICIAN ASSISTANT
Payer: MEDICARE

## 2020-11-19 ENCOUNTER — OFFICE VISIT (OUTPATIENT)
Dept: URGENT CARE | Facility: PHYSICIAN GROUP | Age: 85
End: 2020-11-19
Payer: MEDICARE

## 2020-11-19 VITALS
HEIGHT: 63 IN | TEMPERATURE: 98.5 F | WEIGHT: 105 LBS | BODY MASS INDEX: 18.61 KG/M2 | SYSTOLIC BLOOD PRESSURE: 118 MMHG | DIASTOLIC BLOOD PRESSURE: 72 MMHG | OXYGEN SATURATION: 92 % | HEART RATE: 98 BPM

## 2020-11-19 DIAGNOSIS — R10.30 LOWER ABDOMINAL PAIN: ICD-10-CM

## 2020-11-19 LAB
APPEARANCE UR: CLEAR
BILIRUB UR STRIP-MCNC: NEGATIVE MG/DL
COLOR UR AUTO: YELLOW
GLUCOSE UR STRIP.AUTO-MCNC: NEGATIVE MG/DL
KETONES UR STRIP.AUTO-MCNC: NEGATIVE MG/DL
LEUKOCYTE ESTERASE UR QL STRIP.AUTO: NEGATIVE
NITRITE UR QL STRIP.AUTO: NEGATIVE
PH UR STRIP.AUTO: 7 [PH] (ref 5–8)
PROT UR QL STRIP: 100 MG/DL
RBC UR QL AUTO: NORMAL
SP GR UR STRIP.AUTO: 1.02
UROBILINOGEN UR STRIP-MCNC: 0.2 MG/DL

## 2020-11-19 PROCEDURE — 87086 URINE CULTURE/COLONY COUNT: CPT

## 2020-11-19 PROCEDURE — 99213 OFFICE O/P EST LOW 20 MIN: CPT | Performed by: PHYSICIAN ASSISTANT

## 2020-11-19 PROCEDURE — 81002 URINALYSIS NONAUTO W/O SCOPE: CPT | Performed by: PHYSICIAN ASSISTANT

## 2020-11-19 ASSESSMENT — ENCOUNTER SYMPTOMS
SHORTNESS OF BREATH: 0
COUGH: 0
HEADACHES: 0
PALPITATIONS: 0
DIARRHEA: 0
FLANK PAIN: 0
HEMATOCHEZIA: 0
MYALGIAS: 0
VOMITING: 1
FLATUS: 0
BLOOD IN STOOL: 0
ABDOMINAL PAIN: 1
FALLS: 1
FEVER: 0
NAUSEA: 1
CONSTIPATION: 0
CHILLS: 0
LOSS OF CONSCIOUSNESS: 0

## 2020-11-19 ASSESSMENT — FIBROSIS 4 INDEX: FIB4 SCORE: 1.024390243902439024
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
## 2020-11-21 DIAGNOSIS — R10.30 LOWER ABDOMINAL PAIN: ICD-10-CM

## 2020-11-23 ENCOUNTER — OFFICE VISIT (OUTPATIENT)
Dept: MEDICAL GROUP | Facility: PHYSICIAN GROUP | Age: 85
End: 2020-11-23
Payer: MEDICARE

## 2020-11-23 ENCOUNTER — HOSPITAL ENCOUNTER (OUTPATIENT)
Dept: LAB | Facility: MEDICAL CENTER | Age: 85
End: 2020-11-23
Attending: FAMILY MEDICINE
Payer: MEDICARE

## 2020-11-23 VITALS
WEIGHT: 90 LBS | HEART RATE: 82 BPM | TEMPERATURE: 98.2 F | DIASTOLIC BLOOD PRESSURE: 66 MMHG | BODY MASS INDEX: 16.99 KG/M2 | OXYGEN SATURATION: 96 % | SYSTOLIC BLOOD PRESSURE: 122 MMHG | HEIGHT: 61 IN

## 2020-11-23 DIAGNOSIS — R41.3 MEMORY LOSS: ICD-10-CM

## 2020-11-23 DIAGNOSIS — K59.00 CONSTIPATION, UNSPECIFIED CONSTIPATION TYPE: ICD-10-CM

## 2020-11-23 DIAGNOSIS — Z23 NEED FOR VACCINATION: ICD-10-CM

## 2020-11-23 DIAGNOSIS — R10.30 LOWER ABDOMINAL PAIN: ICD-10-CM

## 2020-11-23 DIAGNOSIS — Z86.16 HISTORY OF 2019 NOVEL CORONAVIRUS DISEASE (COVID-19): ICD-10-CM

## 2020-11-23 DIAGNOSIS — R63.0 POOR APPETITE: ICD-10-CM

## 2020-11-23 DIAGNOSIS — R63.4 WEIGHT LOSS: ICD-10-CM

## 2020-11-23 LAB
ALBUMIN SERPL BCP-MCNC: 4.1 G/DL (ref 3.2–4.9)
ALBUMIN/GLOB SERPL: 1.6 G/DL
ALP SERPL-CCNC: 48 U/L (ref 30–99)
ALT SERPL-CCNC: 19 U/L (ref 2–50)
ANION GAP SERPL CALC-SCNC: 8 MMOL/L (ref 7–16)
AST SERPL-CCNC: 28 U/L (ref 12–45)
BASOPHILS # BLD AUTO: 0.4 % (ref 0–1.8)
BASOPHILS # BLD: 0.02 K/UL (ref 0–0.12)
BILIRUB SERPL-MCNC: 0.7 MG/DL (ref 0.1–1.5)
BUN SERPL-MCNC: 19 MG/DL (ref 8–22)
CALCIUM SERPL-MCNC: 9.7 MG/DL (ref 8.5–10.5)
CHLORIDE SERPL-SCNC: 93 MMOL/L (ref 96–112)
CO2 SERPL-SCNC: 28 MMOL/L (ref 20–33)
CREAT SERPL-MCNC: 1.24 MG/DL (ref 0.5–1.4)
EOSINOPHIL # BLD AUTO: 0.03 K/UL (ref 0–0.51)
EOSINOPHIL NFR BLD: 0.5 % (ref 0–6.9)
ERYTHROCYTE [DISTWIDTH] IN BLOOD BY AUTOMATED COUNT: 45.1 FL (ref 35.9–50)
GLOBULIN SER CALC-MCNC: 2.6 G/DL (ref 1.9–3.5)
GLUCOSE SERPL-MCNC: 96 MG/DL (ref 65–99)
HCT VFR BLD AUTO: 42.9 % (ref 37–47)
HGB BLD-MCNC: 13.7 G/DL (ref 12–16)
IMM GRANULOCYTES # BLD AUTO: 0.02 K/UL (ref 0–0.11)
IMM GRANULOCYTES NFR BLD AUTO: 0.4 % (ref 0–0.9)
LYMPHOCYTES # BLD AUTO: 0.54 K/UL (ref 1–4.8)
LYMPHOCYTES NFR BLD: 9.7 % (ref 22–41)
MCH RBC QN AUTO: 29.1 PG (ref 27–33)
MCHC RBC AUTO-ENTMCNC: 31.9 G/DL (ref 33.6–35)
MCV RBC AUTO: 91.1 FL (ref 81.4–97.8)
MONOCYTES # BLD AUTO: 0.66 K/UL (ref 0–0.85)
MONOCYTES NFR BLD AUTO: 11.9 % (ref 0–13.4)
NEUTROPHILS # BLD AUTO: 4.27 K/UL (ref 2–7.15)
NEUTROPHILS NFR BLD: 77.1 % (ref 44–72)
NRBC # BLD AUTO: 0 K/UL
NRBC BLD-RTO: 0 /100 WBC
PLATELET # BLD AUTO: 226 K/UL (ref 164–446)
PMV BLD AUTO: 9.8 FL (ref 9–12.9)
POTASSIUM SERPL-SCNC: 4 MMOL/L (ref 3.6–5.5)
PROT SERPL-MCNC: 6.7 G/DL (ref 6–8.2)
RBC # BLD AUTO: 4.71 M/UL (ref 4.2–5.4)
SODIUM SERPL-SCNC: 129 MMOL/L (ref 135–145)
VIT B12 SERPL-MCNC: 655 PG/ML (ref 211–911)
WBC # BLD AUTO: 5.5 K/UL (ref 4.8–10.8)

## 2020-11-23 PROCEDURE — 99214 OFFICE O/P EST MOD 30 MIN: CPT | Mod: 25 | Performed by: FAMILY MEDICINE

## 2020-11-23 PROCEDURE — 80053 COMPREHEN METABOLIC PANEL: CPT

## 2020-11-23 PROCEDURE — 36415 COLL VENOUS BLD VENIPUNCTURE: CPT

## 2020-11-23 PROCEDURE — 90662 IIV NO PRSV INCREASED AG IM: CPT | Performed by: FAMILY MEDICINE

## 2020-11-23 PROCEDURE — G0008 ADMIN INFLUENZA VIRUS VAC: HCPCS | Performed by: FAMILY MEDICINE

## 2020-11-23 PROCEDURE — 82607 VITAMIN B-12: CPT

## 2020-11-23 PROCEDURE — 85025 COMPLETE CBC W/AUTO DIFF WBC: CPT

## 2020-11-23 RX ORDER — MIRTAZAPINE 15 MG/1
15 TABLET, FILM COATED ORAL
Qty: 90 TAB | Refills: 1 | Status: SHIPPED | OUTPATIENT
Start: 2020-11-23 | End: 2021-03-15 | Stop reason: SDUPTHER

## 2020-11-23 ASSESSMENT — FIBROSIS 4 INDEX: FIB4 SCORE: 1.024390243902439024
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
## 2020-11-24 ENCOUNTER — TELEPHONE (OUTPATIENT)
Dept: MEDICAL GROUP | Facility: PHYSICIAN GROUP | Age: 85
End: 2020-11-24

## 2020-11-24 LAB
BACTERIA UR CULT: NORMAL
SIGNIFICANT IND 70042: NORMAL
SITE SITE: NORMAL
SOURCE SOURCE: NORMAL

## 2020-11-24 NOTE — TELEPHONE ENCOUNTER
----- Message from Samantha Godfrey D.O. sent at 2020 11:50 AM PST -----  Patient does need results of her lab work, but is dealing with severe dementia at this time.  Her   is the one listed as her contact info.  She does live with several family members, and was present at the appointment with her granddaughter yesterday.  If you can talk to somebody who is taking care of Elisabeth and give them these results, that would be great.  If we can also get the emergency contacts changed in her chart, that would also be great.    Overall, labs show sodium which is mildly low.  We do commonly see this in patients who are not eating much.  We did prescribe an appetite stimulant and recommend the daily protein shakes at her visit yesterday.    Other than that all of her labs look stable from previous checks, and there is nothing obvious on here that would be a cause for her worsening confusion since COVID-19.

## 2020-11-25 NOTE — TELEPHONE ENCOUNTER
Informed pt son he asked when her next appointment was and what it was for I informed him with the  next appointment was and what it was for for the pt

## 2020-12-03 ENCOUNTER — HOSPITAL ENCOUNTER (OUTPATIENT)
Dept: RADIOLOGY | Facility: MEDICAL CENTER | Age: 85
End: 2020-12-03
Attending: FAMILY MEDICINE
Payer: MEDICARE

## 2020-12-03 DIAGNOSIS — R41.3 MEMORY LOSS: ICD-10-CM

## 2020-12-08 ENCOUNTER — TELEPHONE (OUTPATIENT)
Dept: MEDICAL GROUP | Facility: PHYSICIAN GROUP | Age: 85
End: 2020-12-08

## 2020-12-08 DIAGNOSIS — R41.3 MEMORY LOSS: ICD-10-CM

## 2020-12-09 NOTE — TELEPHONE ENCOUNTER
Got a call from patient's son Cullen. She was unable to complete the brain MRI. They would like to try again with sedation.

## 2020-12-24 ENCOUNTER — HOSPITAL ENCOUNTER (OUTPATIENT)
Dept: RADIOLOGY | Facility: MEDICAL CENTER | Age: 85
End: 2020-12-24
Attending: FAMILY MEDICINE
Payer: MEDICARE

## 2020-12-24 DIAGNOSIS — R41.3 MEMORY LOSS: ICD-10-CM

## 2020-12-24 PROCEDURE — 70551 MRI BRAIN STEM W/O DYE: CPT

## 2020-12-24 PROCEDURE — 700102 HCHG RX REV CODE 250 W/ 637 OVERRIDE(OP): Performed by: RADIOLOGY

## 2020-12-24 PROCEDURE — A9270 NON-COVERED ITEM OR SERVICE: HCPCS | Performed by: RADIOLOGY

## 2020-12-24 RX ORDER — DIAZEPAM 5 MG/1
5 TABLET ORAL
Status: COMPLETED | OUTPATIENT
Start: 2020-12-24 | End: 2020-12-24

## 2020-12-24 RX ADMIN — DIAZEPAM 5 MG: 5 TABLET ORAL at 12:40

## 2020-12-24 NOTE — DISCHARGE INSTRUCTIONS
MRI ADULT DISCHARGE INSTRUCTIONS    You have been medicated today for your scan. Please follow the instructions below to ensure your safe recovery. If you have any questions or problems, feel free to call us at [phone] or [phone].     1.   Have someone stay with you to assist you as needed.    2.   Do not drive or operate any mechanical devices.    3.   Do not perform any activity that requires concentration. Make no major decisions over the next 24 hours.     4.   Be careful changing positions from laying down to sitting or standing, as you may become dizzy.     5.   Do not drink alcohol for 48 hours.    6.   There are no restrictions for eating your normal meals. Drink fluids.    7.   You may continue your usual medications for pain, tranquilizers, muscle relaxants or sedatives when awake.     8.   Tomorrow, you may continue your normal daily activities.     9.   Pressure dressing on 10 - 15 minutes. If swelling or bleeding occurs when removed, continue placing direct pressure on injection site for another 5 minutes, or until bleeding stops.   Diazepam (VALIUM) Oral solution  What is this medicine?  You were prescribed DIAZEPAM (dye AZ e jorge) for the procedure you had today. This medication is a benzodiazepine. It is used to treat anxiety and nervousness. It also can help treat alcohol withdrawal, relax muscles, and treat certain types of seizures.  This medicine may be used for other purposes; ask your health care provider or pharmacist if you have questions.  What side effects may I notice from receiving this medicine?  Side effects that you should report to your doctor or health care professional as soon as possible:  • allergic reactions like skin rash, itching or hives, swelling of the face, lips, or tongue  • angry, confused, depressed, other mood changes  • breathing problems  • feeling faint or lightheaded, falls  • muscle cramps  • problems with balance, talking,  walking  • restlessness  • tremors  • trouble passing urine or change in the amount of urine  • unusually weak or tired  Side effects that usually do not require medical attention (report to your doctor or health care professional if they continue or are bothersome):  • difficulty sleeping, nightmares  • dizziness, drowsiness, clumsiness, or unsteadiness, a hangover effect  • headache  • nausea, vomiting  This list may not describe all possible side effects. Call your doctor for medical advice about side effects. You may report side effects to FDA at 8-671-AXQ-2188.    I have been informed of and understand the above discharge instructions.

## 2021-01-11 ENCOUNTER — OFFICE VISIT (OUTPATIENT)
Dept: NEUROLOGY | Facility: MEDICAL CENTER | Age: 86
End: 2021-01-11
Attending: STUDENT IN AN ORGANIZED HEALTH CARE EDUCATION/TRAINING PROGRAM
Payer: MEDICARE

## 2021-01-11 VITALS
TEMPERATURE: 98.2 F | HEIGHT: 62 IN | OXYGEN SATURATION: 96 % | WEIGHT: 93.03 LBS | DIASTOLIC BLOOD PRESSURE: 60 MMHG | SYSTOLIC BLOOD PRESSURE: 108 MMHG | HEART RATE: 90 BPM | RESPIRATION RATE: 16 BRPM | BODY MASS INDEX: 17.12 KG/M2

## 2021-01-11 DIAGNOSIS — D50.8 OTHER IRON DEFICIENCY ANEMIA: ICD-10-CM

## 2021-01-11 DIAGNOSIS — R41.3 MEMORY LOSS: ICD-10-CM

## 2021-01-11 DIAGNOSIS — Z23 NEED FOR VACCINATION: ICD-10-CM

## 2021-01-11 PROCEDURE — 99211 OFF/OP EST MAY X REQ PHY/QHP: CPT | Performed by: STUDENT IN AN ORGANIZED HEALTH CARE EDUCATION/TRAINING PROGRAM

## 2021-01-11 PROCEDURE — 99205 OFFICE O/P NEW HI 60 MIN: CPT | Performed by: STUDENT IN AN ORGANIZED HEALTH CARE EDUCATION/TRAINING PROGRAM

## 2021-01-11 RX ORDER — DONEPEZIL HYDROCHLORIDE 5 MG/1
5 TABLET, FILM COATED ORAL EVERY EVENING
Qty: 30 TAB | Refills: 3 | Status: SHIPPED | OUTPATIENT
Start: 2021-01-11 | End: 2021-04-12 | Stop reason: SDUPTHER

## 2021-01-11 ASSESSMENT — FIBROSIS 4 INDEX: FIB4 SCORE: 2.5
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
## 2021-01-18 ENCOUNTER — OFFICE VISIT (OUTPATIENT)
Dept: MEDICAL GROUP | Facility: PHYSICIAN GROUP | Age: 86
End: 2021-01-18
Payer: MEDICARE

## 2021-01-18 ENCOUNTER — HOSPITAL ENCOUNTER (OUTPATIENT)
Dept: LAB | Facility: MEDICAL CENTER | Age: 86
End: 2021-01-18
Attending: STUDENT IN AN ORGANIZED HEALTH CARE EDUCATION/TRAINING PROGRAM
Payer: MEDICARE

## 2021-01-18 ENCOUNTER — PATIENT MESSAGE (OUTPATIENT)
Dept: MEDICAL GROUP | Facility: PHYSICIAN GROUP | Age: 86
End: 2021-01-18

## 2021-01-18 VITALS
HEIGHT: 62 IN | BODY MASS INDEX: 16.56 KG/M2 | HEART RATE: 96 BPM | OXYGEN SATURATION: 90 % | WEIGHT: 90 LBS | TEMPERATURE: 97.6 F | DIASTOLIC BLOOD PRESSURE: 58 MMHG | SYSTOLIC BLOOD PRESSURE: 108 MMHG

## 2021-01-18 DIAGNOSIS — R41.3 MEMORY LOSS: ICD-10-CM

## 2021-01-18 DIAGNOSIS — D50.8 OTHER IRON DEFICIENCY ANEMIA: ICD-10-CM

## 2021-01-18 DIAGNOSIS — R63.0 POOR APPETITE: ICD-10-CM

## 2021-01-18 DIAGNOSIS — M47.812 CERVICAL SPINE ARTHRITIS: ICD-10-CM

## 2021-01-18 LAB
25(OH)D3 SERPL-MCNC: 45 NG/ML (ref 30–100)
ALBUMIN SERPL BCP-MCNC: 4.5 G/DL (ref 3.2–4.9)
ALBUMIN/GLOB SERPL: 1.6 G/DL
ALP SERPL-CCNC: 71 U/L (ref 30–99)
ALT SERPL-CCNC: 14 U/L (ref 2–50)
ANION GAP SERPL CALC-SCNC: 11 MMOL/L (ref 7–16)
AST SERPL-CCNC: 25 U/L (ref 12–45)
BASOPHILS # BLD AUTO: 0.6 % (ref 0–1.8)
BASOPHILS # BLD: 0.03 K/UL (ref 0–0.12)
BILIRUB SERPL-MCNC: 0.7 MG/DL (ref 0.1–1.5)
BUN SERPL-MCNC: 29 MG/DL (ref 8–22)
CALCIUM SERPL-MCNC: 10 MG/DL (ref 8.5–10.5)
CHLORIDE SERPL-SCNC: 100 MMOL/L (ref 96–112)
CO2 SERPL-SCNC: 27 MMOL/L (ref 20–33)
CREAT SERPL-MCNC: 1.31 MG/DL (ref 0.5–1.4)
EOSINOPHIL # BLD AUTO: 0.14 K/UL (ref 0–0.51)
EOSINOPHIL NFR BLD: 2.7 % (ref 0–6.9)
ERYTHROCYTE [DISTWIDTH] IN BLOOD BY AUTOMATED COUNT: 48.2 FL (ref 35.9–50)
FASTING STATUS PATIENT QL REPORTED: NORMAL
FERRITIN SERPL-MCNC: 233 NG/ML (ref 10–291)
GLOBULIN SER CALC-MCNC: 2.8 G/DL (ref 1.9–3.5)
GLUCOSE SERPL-MCNC: 87 MG/DL (ref 65–99)
HCT VFR BLD AUTO: 41.4 % (ref 37–47)
HGB BLD-MCNC: 13.1 G/DL (ref 12–16)
IMM GRANULOCYTES # BLD AUTO: 0.02 K/UL (ref 0–0.11)
IMM GRANULOCYTES NFR BLD AUTO: 0.4 % (ref 0–0.9)
IRON SATN MFR SERPL: 27 % (ref 15–55)
IRON SERPL-MCNC: 79 UG/DL (ref 40–170)
LYMPHOCYTES # BLD AUTO: 0.69 K/UL (ref 1–4.8)
LYMPHOCYTES NFR BLD: 13.1 % (ref 22–41)
MCH RBC QN AUTO: 29.7 PG (ref 27–33)
MCHC RBC AUTO-ENTMCNC: 31.6 G/DL (ref 33.6–35)
MCV RBC AUTO: 93.9 FL (ref 81.4–97.8)
MONOCYTES # BLD AUTO: 0.59 K/UL (ref 0–0.85)
MONOCYTES NFR BLD AUTO: 11.2 % (ref 0–13.4)
NEUTROPHILS # BLD AUTO: 3.78 K/UL (ref 2–7.15)
NEUTROPHILS NFR BLD: 72 % (ref 44–72)
NRBC # BLD AUTO: 0 K/UL
NRBC BLD-RTO: 0 /100 WBC
PLATELET # BLD AUTO: 272 K/UL (ref 164–446)
PMV BLD AUTO: 9.9 FL (ref 9–12.9)
POTASSIUM SERPL-SCNC: 4.4 MMOL/L (ref 3.6–5.5)
PROT SERPL-MCNC: 7.3 G/DL (ref 6–8.2)
RBC # BLD AUTO: 4.41 M/UL (ref 4.2–5.4)
SODIUM SERPL-SCNC: 138 MMOL/L (ref 135–145)
TIBC SERPL-MCNC: 295 UG/DL (ref 250–450)
UIBC SERPL-MCNC: 216 UG/DL (ref 110–370)
VIT B12 SERPL-MCNC: 382 PG/ML (ref 211–911)
WBC # BLD AUTO: 5.3 K/UL (ref 4.8–10.8)

## 2021-01-18 PROCEDURE — 85025 COMPLETE CBC W/AUTO DIFF WBC: CPT

## 2021-01-18 PROCEDURE — 83540 ASSAY OF IRON: CPT

## 2021-01-18 PROCEDURE — 82728 ASSAY OF FERRITIN: CPT

## 2021-01-18 PROCEDURE — 84425 ASSAY OF VITAMIN B-1: CPT

## 2021-01-18 PROCEDURE — 80053 COMPREHEN METABOLIC PANEL: CPT

## 2021-01-18 PROCEDURE — 82306 VITAMIN D 25 HYDROXY: CPT | Mod: GZ

## 2021-01-18 PROCEDURE — 82607 VITAMIN B-12: CPT

## 2021-01-18 PROCEDURE — 99214 OFFICE O/P EST MOD 30 MIN: CPT | Performed by: FAMILY MEDICINE

## 2021-01-18 PROCEDURE — 36415 COLL VENOUS BLD VENIPUNCTURE: CPT

## 2021-01-18 PROCEDURE — 83550 IRON BINDING TEST: CPT

## 2021-01-18 ASSESSMENT — PATIENT HEALTH QUESTIONNAIRE - PHQ9: CLINICAL INTERPRETATION OF PHQ2 SCORE: 0

## 2021-01-18 ASSESSMENT — FIBROSIS 4 INDEX: FIB4 SCORE: 2.5
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
## 2021-01-21 LAB — VIT B1 BLD-MCNC: 75 NMOL/L (ref 70–180)

## 2021-03-08 ENCOUNTER — OFFICE VISIT (OUTPATIENT)
Dept: NEUROLOGY | Facility: MEDICAL CENTER | Age: 86
End: 2021-03-08
Attending: STUDENT IN AN ORGANIZED HEALTH CARE EDUCATION/TRAINING PROGRAM
Payer: MEDICARE

## 2021-03-08 VITALS
RESPIRATION RATE: 15 BRPM | TEMPERATURE: 98.2 F | BODY MASS INDEX: 17.7 KG/M2 | HEART RATE: 88 BPM | DIASTOLIC BLOOD PRESSURE: 68 MMHG | WEIGHT: 96.78 LBS | SYSTOLIC BLOOD PRESSURE: 104 MMHG | OXYGEN SATURATION: 92 %

## 2021-03-08 DIAGNOSIS — G31.84 MILD COGNITIVE IMPAIRMENT: ICD-10-CM

## 2021-03-08 DIAGNOSIS — R41.89 COGNITIVE IMPAIRMENT: ICD-10-CM

## 2021-03-08 DIAGNOSIS — R41.3 MEMORY LOSS: ICD-10-CM

## 2021-03-08 DIAGNOSIS — F03.90 DEMENTIA WITHOUT BEHAVIORAL DISTURBANCE, UNSPECIFIED DEMENTIA TYPE: ICD-10-CM

## 2021-03-08 PROCEDURE — 99212 OFFICE O/P EST SF 10 MIN: CPT | Performed by: STUDENT IN AN ORGANIZED HEALTH CARE EDUCATION/TRAINING PROGRAM

## 2021-03-08 PROCEDURE — 99214 OFFICE O/P EST MOD 30 MIN: CPT | Performed by: STUDENT IN AN ORGANIZED HEALTH CARE EDUCATION/TRAINING PROGRAM

## 2021-03-08 RX ORDER — MEMANTINE HYDROCHLORIDE 5 MG/1
5 TABLET ORAL 2 TIMES DAILY
Qty: 90 TABLET | Refills: 4 | Status: SHIPPED | OUTPATIENT
Start: 2021-03-08 | End: 2021-06-07

## 2021-03-08 ASSESSMENT — FIBROSIS 4 INDEX: FIB4 SCORE: 2.16

## 2021-03-08 NOTE — PROGRESS NOTES
Neurology Clinic - Follow-up Note        Chief complaint: Mild cognitive impairment vs early dementia          Interval History:    She is doing well. Mood is good. She remains active with her pouzzles and regularly socializes with friends. Sleep remains good as well. She regularly exercises. No issues with falling.      Appetite is good with Mirtazepine. Son says Aricept is helping although she has had some vivid dreams      ROS: Pertinent positives and negatives are as documented above          Current medications:     Current Outpatient Medications   Medication Instructions   • APAP 650 mg, Oral, 2 TIMES DAILY PRN   • aspirin EC (ECOTRIN) 81 mg, Oral, DAILY   • Cholecalciferol (VITAMIN D3) 1000 UNITS Cap 1 capsule, Oral, 2 TIMES DAILY   • Diclofenac Sodium 1 % Gel Apply 0.5-1 g to neck daily as needed.   • donepezil (ARICEPT) 5 mg, Oral, EVERY EVENING   • Glucosamine-Chondroit-Vit C-Mn (GLUCOSAMINE 1500 COMPLEX PO) Oral, 2 TIMES DAILY   • mirtazapine (REMERON) 15 mg, Oral, EVERY BEDTIME           Physical examination:   Vitals:    03/08/21 1005   BP: 104/68   Pulse: 88   Resp: 15   Temp: 36.8 °C (98.2 °F)   SpO2: 92%     Vitals:    03/08/21 1005   BP: 104/68   Pulse: 88   Resp: 15   Temp: 36.8 °C (98.2 °F)   SpO2: 92%       General: Patient in no acute distress, pleasant and cooperative.  HEENT: Normocephalic, no signs of acute trauma.   Neck: limited ROM d/t arthritis  Chest: clear.No cough.   Skin: no signs of acute rashes or trauma.   Musculoskeletal: Normal ROM throughout  Psychiatric:  Denies symptoms of depression or suicidal ideation. Mood and affect appear normal on exam.      NEUROLOGICAL EXAM:   Mental status:  orientation: Awake, alert and oriented to self, month, year,  Attention: Intact    Frontal release signs: Absent  Speech and language: speech is clear and fluent.  The patient is able to name objects.  Some difficulty with repetition.  Was able to comprehend multistep commands.  Memory: impaired  short term recall  Cranial nerve exam:   I: smell Not tested   II: visual acuity  OS: NT   OD: NT   II: visual fields Full to confrontation  Visual neglect: absent   II: pupils Equal, round, reactive to light   III,VII: ptosis None   III,IV,VI: extraocular muscles  Full ROM   V: mastication Normal   V: facial light touch sensation  Normal   V,VII: corneal reflex  Not tested   VII: facial muscle function - upper  Normal   VII: facial muscle function - lower Normal   VIII: hearing  grossly intact   IX: soft palate elevation  Normal                   XII: tongue strength  NT     Motor exam:   • Grossly intact throughout.  Tone normal.  No tremors or abnormal movements  Sensory exam grossly intact    Gait:   • The patient was able to get up from seated position on first attempt without requiring assistance.   • Found to be steady when walking.   • Movements were fluid with normal arm swing.   • The patient was able to turn without difficulties or tendency to fall.   Romberg exam subtlety present     ANCILLARY DATA   REVIEWED:             Results for orders placed during the hospital encounter of 12/24/20   MR-BRAIN-W/O    Impression 1.  Mild to moderate cerebral atrophy and chronic microvascular ischemic type changes.  2.  No acute intracranial abnormality.                          Results for orders placed during the hospital encounter of 01/30/14   MR-CERVICAL SPINE-W/O    Impression 1.  Mild cervical spondylotic changes at the C5-6 and C6-7 levels with minimal spondylotic change at the C4-5 level.    2.  Moderate bilateral neuroforaminal narrowing at C56 and C6-7 levels.                                                                                 No results found for this or any previous visit.         Lab Data:  Reviewed    Hospital Outpatient Visit on 01/18/2021   Component Date Value   • Vitamin B1 01/18/2021 75    • Vitamin B12 -True Cobala* 01/18/2021 382    • Sodium 01/18/2021 138    • Potassium 01/18/2021  4.4    • Chloride 01/18/2021 100    • Co2 01/18/2021 27    • Anion Gap 01/18/2021 11.0    • Glucose 01/18/2021 87    • Bun 01/18/2021 29*   • Creatinine 01/18/2021 1.31    • Calcium 01/18/2021 10.0    • AST(SGOT) 01/18/2021 25    • ALT(SGPT) 01/18/2021 14    • Alkaline Phosphatase 01/18/2021 71    • Total Bilirubin 01/18/2021 0.7    • Albumin 01/18/2021 4.5    • Total Protein 01/18/2021 7.3    • Globulin 01/18/2021 2.8    • A-G Ratio 01/18/2021 1.6    • WBC 01/18/2021 5.3    • RBC 01/18/2021 4.41    • Hemoglobin 01/18/2021 13.1    • Hematocrit 01/18/2021 41.4    • MCV 01/18/2021 93.9    • MCH 01/18/2021 29.7    • MCHC 01/18/2021 31.6*   • RDW 01/18/2021 48.2    • Platelet Count 01/18/2021 272    • MPV 01/18/2021 9.9    • Neutrophils-Polys 01/18/2021 72.00    • Lymphocytes 01/18/2021 13.10*   • Monocytes 01/18/2021 11.20    • Eosinophils 01/18/2021 2.70    • Basophils 01/18/2021 0.60    • Immature Granulocytes 01/18/2021 0.40    • Nucleated RBC 01/18/2021 0.00    • Neutrophils (Absolute) 01/18/2021 3.78    • Lymphs (Absolute) 01/18/2021 0.69*   • Monos (Absolute) 01/18/2021 0.59    • Eos (Absolute) 01/18/2021 0.14    • Baso (Absolute) 01/18/2021 0.03    • Immature Granulocytes (a* 01/18/2021 0.02    • NRBC (Absolute) 01/18/2021 0.00    • Ferritin 01/18/2021 233.0    • Iron 01/18/2021 79    • Total Iron Binding 01/18/2021 295    • Unsat Iron Binding 01/18/2021 216    • % Saturation 01/18/2021 27    • Fasting Status 01/18/2021 Fasting    • 25-Hydroxy   Vitamin D 25 01/18/2021 45    • GFR If  01/18/2021 46*   • GFR If Non  Ameri* 01/18/2021 38*   Hospital Outpatient Visit on 11/23/2020   Component Date Value   • WBC 11/23/2020 5.5    • RBC 11/23/2020 4.71    • Hemoglobin 11/23/2020 13.7    • Hematocrit 11/23/2020 42.9    • MCV 11/23/2020 91.1    • MCH 11/23/2020 29.1    • MCHC 11/23/2020 31.9*   • RDW 11/23/2020 45.1    • Platelet Count 11/23/2020 226    • MPV 11/23/2020 9.8    • Neutrophils-Polys  11/23/2020 77.10*   • Lymphocytes 11/23/2020 9.70*   • Monocytes 11/23/2020 11.90    • Eosinophils 11/23/2020 0.50    • Basophils 11/23/2020 0.40    • Immature Granulocytes 11/23/2020 0.40    • Nucleated RBC 11/23/2020 0.00    • Neutrophils (Absolute) 11/23/2020 4.27    • Lymphs (Absolute) 11/23/2020 0.54*   • Monos (Absolute) 11/23/2020 0.66    • Eos (Absolute) 11/23/2020 0.03    • Baso (Absolute) 11/23/2020 0.02    • Immature Granulocytes (a* 11/23/2020 0.02    • NRBC (Absolute) 11/23/2020 0.00    • Sodium 11/23/2020 129*   • Potassium 11/23/2020 4.0    • Chloride 11/23/2020 93*   • Co2 11/23/2020 28    • Anion Gap 11/23/2020 8.0    • Glucose 11/23/2020 96    • Bun 11/23/2020 19    • Creatinine 11/23/2020 1.24    • Calcium 11/23/2020 9.7    • AST(SGOT) 11/23/2020 28    • ALT(SGPT) 11/23/2020 19    • Alkaline Phosphatase 11/23/2020 48    • Total Bilirubin 11/23/2020 0.7    • Albumin 11/23/2020 4.1    • Total Protein 11/23/2020 6.7    • Globulin 11/23/2020 2.6    • A-G Ratio 11/23/2020 1.6    • Vitamin B12 -True Cobala* 11/23/2020 655    • GFR If  11/23/2020 49*   • GFR If Non  Ameri* 11/23/2020 41*   Hospital Outpatient Visit on 11/19/2020   Component Date Value   • Significant Indicator 11/19/2020 NEG    • Source 11/19/2020 UR    • Site 11/19/2020 -    • Culture Result 11/19/2020 Mixed skin gilberto <10,000 cfu/mL    Office Visit on 11/19/2020   Component Date Value   • POC Color 11/19/2020 Yellow    • POC Appearance 11/19/2020 Clear    • POC Leukocyte Esterase 11/19/2020 Negative    • POC Nitrites 11/19/2020 Negative    • POC Urobiligen 11/19/2020 0.2    • POC Protein 11/19/2020 100    • POC Urine PH 11/19/2020 7.0    • POC Blood 11/19/2020 Trace    • POC Specific Gravity 11/19/2020 1.020    • POC Ketones 11/19/2020 Negative    • POC Bilirubin 11/19/2020 Negative    • POC Glucose 11/19/2020 Negative    Admission on 11/10/2020, Discharged on 11/10/2020   Component Date Value   • WBC 11/10/2020  5.5    • RBC 11/10/2020 5.09    • Hemoglobin 11/10/2020 14.8    • Hematocrit 11/10/2020 44.6    • MCV 11/10/2020 87.6    • MCH 11/10/2020 29.1    • MCHC 11/10/2020 33.2*   • RDW 11/10/2020 41.9    • Platelet Count 11/10/2020 451*   • MPV 11/10/2020 8.9*   • Neutrophils-Polys 11/10/2020 79.00*   • Lymphocytes 11/10/2020 12.40*   • Monocytes 11/10/2020 7.60    • Eosinophils 11/10/2020 0.40    • Basophils 11/10/2020 0.20    • Immature Granulocytes 11/10/2020 0.40    • Nucleated RBC 11/10/2020 0.00    • Neutrophils (Absolute) 11/10/2020 4.35    • Lymphs (Absolute) 11/10/2020 0.68*   • Monos (Absolute) 11/10/2020 0.42    • Eos (Absolute) 11/10/2020 0.02    • Baso (Absolute) 11/10/2020 0.01    • Immature Granulocytes (a* 11/10/2020 0.02    • NRBC (Absolute) 11/10/2020 0.00    • Sodium 11/10/2020 130*   • Potassium 11/10/2020 4.2    • Chloride 11/10/2020 94*   • Co2 11/10/2020 22    • Anion Gap 11/10/2020 14.0    • Glucose 11/10/2020 156*   • Bun 11/10/2020 23*   • Creatinine 11/10/2020 1.03    • Calcium 11/10/2020 10.2    • AST(SGOT) 11/10/2020 21    • ALT(SGPT) 11/10/2020 16    • Alkaline Phosphatase 11/10/2020 49    • Total Bilirubin 11/10/2020 0.9    • Albumin 11/10/2020 4.3    • Total Protein 11/10/2020 7.4    • Globulin 11/10/2020 3.1    • A-G Ratio 11/10/2020 1.4    • Lipase 11/10/2020 77    • GFR If  11/10/2020 >60    • GFR If Non  Ameri* 11/10/2020 51*   Admission on 11/04/2020, Discharged on 11/04/2020   Component Date Value   • WBC 11/04/2020 2.8*   • RBC 11/04/2020 4.65    • Hemoglobin 11/04/2020 13.5    • Hematocrit 11/04/2020 41.4    • MCV 11/04/2020 89.0    • MCH 11/04/2020 29.0    • MCHC 11/04/2020 32.6*   • RDW 11/04/2020 43.4    • Platelet Count 11/04/2020 200    • MPV 11/04/2020 9.0    • Neutrophils-Polys 11/04/2020 80.00*   • Lymphocytes 11/04/2020 13.90*   • Monocytes 11/04/2020 4.30    • Eosinophils 11/04/2020 0.00    • Basophils 11/04/2020 0.00    • Nucleated RBC 11/04/2020  0.00    • Neutrophils (Absolute) 11/04/2020 2.24    • Lymphs (Absolute) 11/04/2020 0.39*   • Monos (Absolute) 11/04/2020 0.12    • Eos (Absolute) 11/04/2020 0.00    • Baso (Absolute) 11/04/2020 0.00    • NRBC (Absolute) 11/04/2020 0.00    • Sodium 11/04/2020 134*   • Potassium 11/04/2020 4.0    • Chloride 11/04/2020 100    • Co2 11/04/2020 22    • Anion Gap 11/04/2020 12.0    • Glucose 11/04/2020 104*   • Bun 11/04/2020 17    • Creatinine 11/04/2020 1.04    • Calcium 11/04/2020 9.2    • AST(SGOT) 11/04/2020 25    • ALT(SGPT) 11/04/2020 14    • Alkaline Phosphatase 11/04/2020 38    • Total Bilirubin 11/04/2020 0.4    • Albumin 11/04/2020 3.6    • Total Protein 11/04/2020 6.6    • Globulin 11/04/2020 3.0    • A-G Ratio 11/04/2020 1.2    • Lipase 11/04/2020 57    • Plt Estimation 11/04/2020 Normal    • RBC Morphology 11/04/2020 Normal    • Peripheral Smear Review 11/04/2020 see below    • Metamyelocytes 11/04/2020 0.90    • Myelocytes 11/04/2020 0.90    • Manual Diff Status 11/04/2020 PERFORMED    • GFR If  11/04/2020 >60    • GFR If Non  Ameri* 11/04/2020 50*   • COVID Order Status 11/04/2020 Received    • SARS-CoV-2 Source 11/04/2020 NP Swab    • SARS-CoV-2 by PCR 11/04/2020 DETECTED*                 ASSESSMENT, PLAN, EDUCATION, AND COUNSELING:  This is a 88-year-old female with  mild dementia of Alzheimer's type.  Most pronounced deficits are short-term memory.  She is clinically stable with no concerns from family. She is doing well on aricept. Will add Namenda for additive benefit. Also discussed importance of staying hydrated. She will try flavoring her water to encourage more drinking. Fu in 3 months.    We had an extensive discussion about the importance of adhering to healthy lifestyle:    1. Proper Diet: We recommend the Mediterranean diet   2. Proper Vascular Health: Making sure that your primary care provider (PCP) is screening for and treating all vascular risk factors (diabetes,  high cholesterol, high blood pressure, and such)  3. Quit smoking, if you smoke   4. Exercise as tolerated with a goal of at least 30 minutes 5 days a week or a total of 150 minutes per week  5. Focus on what you enjoy and remove stress from your life. Meditation is highly recommended.  6. Try learning new hobbies or skills, even if you’re not great at them  7. Regular social interaction: Maintain an active social life as much as possible   8. Keep your brain active with cognitively stimulating activities such as brain games and puzzles  9. Address ALL sleeping issues:  • Get 7-8 hours of sleep per night  • It is crucial to treat sleep apnea   10. Maintain a predictable daily routine  11. Maintain adequate hydration  12. B complex vitamins  13. Vitamin C  14. Vitamin E in food  15. Vitamin D supplementation if deficient  16. Eat fish  17. Dental hygiene  18. Use mccain (curcumin)  19. Consume foods that are good antioxidants:  • Beans  • Berries  • Grape juice  • Pomegranate juice  • Green tea     Encounter Diagnoses   Name Primary?   • Memory loss    • Cognitive impairment    • Mild cognitive impairment    • Dementia without behavioral disturbance, unspecified dementia type (HCC)        Orders Placed This Encounter   • memantine (NAMENDA) 5 MG Tab              Rohit Ceballos MD  Epilepsy and General Neurology  Department of Neurology  Instructor of Neurology [facility].   Office: [phone]  Fax: [phone]     BILLING DOCUMENTATION:       Counseling:  I spent a total of 35 minutes of face-to-face time in this visit. Over 50% of the time of the visit today was spent on counseling and or coordination of care wtih the patient and/or family, as above in assessment in plan.

## 2021-03-15 DIAGNOSIS — R63.4 WEIGHT LOSS: ICD-10-CM

## 2021-03-15 DIAGNOSIS — R63.0 POOR APPETITE: ICD-10-CM

## 2021-03-15 RX ORDER — MIRTAZAPINE 15 MG/1
15 TABLET, FILM COATED ORAL
Qty: 90 TABLET | Refills: 1 | Status: SHIPPED | OUTPATIENT
Start: 2021-03-15 | End: 2021-06-07 | Stop reason: SDUPTHER

## 2021-03-23 ENCOUNTER — APPOINTMENT (RX ONLY)
Dept: URBAN - METROPOLITAN AREA CLINIC 4 | Facility: CLINIC | Age: 86
Setting detail: DERMATOLOGY
End: 2021-03-23

## 2021-03-23 DIAGNOSIS — L82.1 OTHER SEBORRHEIC KERATOSIS: ICD-10-CM

## 2021-03-23 DIAGNOSIS — L57.0 ACTINIC KERATOSIS: ICD-10-CM

## 2021-03-23 DIAGNOSIS — D18.0 HEMANGIOMA: ICD-10-CM

## 2021-03-23 DIAGNOSIS — L81.4 OTHER MELANIN HYPERPIGMENTATION: ICD-10-CM

## 2021-03-23 DIAGNOSIS — D22 MELANOCYTIC NEVI: ICD-10-CM

## 2021-03-23 PROBLEM — D48.5 NEOPLASM OF UNCERTAIN BEHAVIOR OF SKIN: Status: ACTIVE | Noted: 2021-03-23

## 2021-03-23 PROBLEM — D18.01 HEMANGIOMA OF SKIN AND SUBCUTANEOUS TISSUE: Status: ACTIVE | Noted: 2021-03-23

## 2021-03-23 PROBLEM — D22.9 MELANOCYTIC NEVI, UNSPECIFIED: Status: ACTIVE | Noted: 2021-03-23

## 2021-03-23 PROCEDURE — 17003 DESTRUCT PREMALG LES 2-14: CPT

## 2021-03-23 PROCEDURE — ? SUNSCREEN RECOMMENDATIONS

## 2021-03-23 PROCEDURE — ? COUNSELING

## 2021-03-23 PROCEDURE — ? LIQUID NITROGEN

## 2021-03-23 PROCEDURE — 11103 TANGNTL BX SKIN EA SEP/ADDL: CPT

## 2021-03-23 PROCEDURE — ? ADDITIONAL NOTES

## 2021-03-23 PROCEDURE — 17000 DESTRUCT PREMALG LESION: CPT | Mod: 59

## 2021-03-23 PROCEDURE — ? BIOPSY BY SHAVE METHOD AND DESTRUCTION

## 2021-03-23 PROCEDURE — 11102 TANGNTL BX SKIN SINGLE LES: CPT

## 2021-03-23 PROCEDURE — 99212 OFFICE O/P EST SF 10 MIN: CPT | Mod: 25

## 2021-03-23 ASSESSMENT — LOCATION SIMPLE DESCRIPTION DERM
LOCATION SIMPLE: LEFT FOREHEAD
LOCATION SIMPLE: RIGHT CHEEK
LOCATION SIMPLE: NECK
LOCATION SIMPLE: RIGHT ZYGOMA
LOCATION SIMPLE: CHEST

## 2021-03-23 ASSESSMENT — LOCATION DETAILED DESCRIPTION DERM
LOCATION DETAILED: LEFT FOREHEAD
LOCATION DETAILED: RIGHT LATERAL SUPERIOR CHEST
LOCATION DETAILED: LEFT LATERAL FOREHEAD
LOCATION DETAILED: LEFT INFERIOR FOREHEAD
LOCATION DETAILED: LEFT MEDIAL SUPERIOR CHEST
LOCATION DETAILED: RIGHT LATERAL ZYGOMA
LOCATION DETAILED: LEFT SUPERIOR FOREHEAD
LOCATION DETAILED: RIGHT MEDIAL MALAR CHEEK
LOCATION DETAILED: LEFT CENTRAL LATERAL NECK
LOCATION DETAILED: MIDDLE STERNUM

## 2021-03-23 ASSESSMENT — LOCATION ZONE DERM
LOCATION ZONE: FACE
LOCATION ZONE: TRUNK
LOCATION ZONE: NECK

## 2021-03-23 NOTE — PROCEDURE: ADDITIONAL NOTES
Additional Notes: Amilcar Reis [phone]
Detail Level: Simple
Render Risk Assessment In Note?: no

## 2021-03-23 NOTE — PROCEDURE: BIOPSY BY SHAVE METHOD AND DESTRUCTION
Detail Level: Detailed
Biopsy Type: H and E
Bill As?: Biopsy by Shave Method
Size Of Lesion In Cm (Optional): 0
Size Of Lesion After Curettage: 0.4
Anesthesia Type: 1% lidocaine with epinephrine
Anesthesia Volume In Cc: 2
Hemostasis: Electrocautery
Destruction Type: electrodesiccation
Number Of Curettages: 3
Wound Care: Petrolatum
Lab: [id number]
Lab Facility: 
Render Path Notes In Note?: No
Consent: Written consent was obtained and risks were reviewed including but not limited to scarring, infection, bleeding, scabbing, incomplete removal, nerve damage and allergy to anesthesia.
Post-Care Instructions: I reviewed with the patient in detail post-care instructions. Patient is to keep the biopsy site dry overnight, and then apply bacitracin twice daily until healed. Patient may apply hydrogen peroxide soaks to remove any crusting.
Notification Instructions: Patient will be notified of biopsy results. However, patient instructed to call the office if not contacted within 2 weeks.
Billing Type: Third-Party Bill

## 2021-04-12 DIAGNOSIS — R41.3 MEMORY LOSS: ICD-10-CM

## 2021-04-12 DIAGNOSIS — D50.8 OTHER IRON DEFICIENCY ANEMIA: ICD-10-CM

## 2021-04-12 RX ORDER — DONEPEZIL HYDROCHLORIDE 5 MG/1
5 TABLET, FILM COATED ORAL EVERY EVENING
Qty: 30 TABLET | Refills: 3 | Status: SHIPPED | OUTPATIENT
Start: 2021-04-12 | End: 2021-05-12 | Stop reason: SDUPTHER

## 2021-04-12 RX ORDER — DONEPEZIL HYDROCHLORIDE 5 MG/1
TABLET, FILM COATED ORAL
Refills: 0 | Status: CANCELLED | OUTPATIENT
Start: 2021-04-12

## 2021-04-12 NOTE — TELEPHONE ENCOUNTER
Received request via: Pharmacy    Was the patient seen in the last year in this department? Yes    Does the patient have an active prescription (recently filled or refills available) for medication(s) requested? Yes.   Please put discrete dispense value- per pharmacy

## 2021-05-02 DIAGNOSIS — R41.3 MEMORY LOSS: ICD-10-CM

## 2021-05-02 DIAGNOSIS — D50.8 OTHER IRON DEFICIENCY ANEMIA: ICD-10-CM

## 2021-05-03 ENCOUNTER — APPOINTMENT (RX ONLY)
Dept: URBAN - METROPOLITAN AREA CLINIC 36 | Facility: CLINIC | Age: 86
Setting detail: DERMATOLOGY
End: 2021-05-03

## 2021-05-03 DIAGNOSIS — L57.0 ACTINIC KERATOSIS: ICD-10-CM

## 2021-05-03 PROBLEM — C44.622 SQUAMOUS CELL CARCINOMA OF SKIN OF RIGHT UPPER LIMB, INCLUDING SHOULDER: Status: ACTIVE | Noted: 2021-05-03

## 2021-05-03 PROCEDURE — ? LIQUID NITROGEN

## 2021-05-03 PROCEDURE — 17000 DESTRUCT PREMALG LESION: CPT | Mod: 59

## 2021-05-03 PROCEDURE — ? CURETTAGE AND DESTRUCTION WITH PATHOLOGY

## 2021-05-03 PROCEDURE — 17272 DSTR MAL LES S/N/H/F/G 1.1-2: CPT

## 2021-05-03 RX ORDER — DONEPEZIL HYDROCHLORIDE 5 MG/1
5 TABLET, FILM COATED ORAL EVERY EVENING
Qty: 30 TABLET | Refills: 3 | Status: CANCELLED | OUTPATIENT
Start: 2021-05-03

## 2021-05-03 ASSESSMENT — LOCATION DETAILED DESCRIPTION DERM: LOCATION DETAILED: LEFT DISTAL CALF

## 2021-05-03 ASSESSMENT — LOCATION ZONE DERM: LOCATION ZONE: LEG

## 2021-05-03 ASSESSMENT — LOCATION SIMPLE DESCRIPTION DERM: LOCATION SIMPLE: LEFT CALF

## 2021-05-03 NOTE — PROCEDURE: CURETTAGE AND DESTRUCTION WITH PATHOLOGY
Detail Level: Detailed
Size Of Lesion In Cm: 1.1
Size Of Lesion After Curettage: 1.5
Anesthesia Type: 1% lidocaine with 1:100,000 epinephrine and 408mcg clindamycin/ml and a 1:10 solution of 8.4% sodium bicarbonate
Anesthesia Volume In Cc: 2.8
Cautery Type: electrodesiccation
Number Of Curettages: 3
What Was Performed First?: Curettage
Additional Information: (Optional): The wound was cleaned, and a pressure dressing was applied.  The patient received detailed post-op instructions.
Lab: [id number]
Lab Facility: 
Histology Text: Following the procedure a portion of the curetted material was sent for histologic evaluation.
Biopsy Type: H and E
Render Path Notes In Note?: No
Consent was obtained from the patient. The risks, benefits and alternatives to therapy were discussed in detail. Specifically, the risks of infection, scarring, bleeding, prolonged wound healing, nerve injury, incomplete removal, allergy to anesthesia and recurrence were addressed. Alternatives to ED&C, such as: surgical removal and XRT were also discussed.  Prior to the procedure, the treatment site was clearly identified and confirmed by the patient. All components of Universal Protocol/PAUSE Rule completed.
Post-Care Instructions: I reviewed with the patient in detail post-care instructions. Patient is to keep the area dry for 48 hours, and not to engage in any swimming until the area is healed. Should the patient develop any fevers, chills, bleeding, severe pain patient will contact the office immediately.
Billing Type: Third-Party Bill
Bill As A Line Item Or As Units: Line Item

## 2021-05-12 DIAGNOSIS — D50.8 OTHER IRON DEFICIENCY ANEMIA: ICD-10-CM

## 2021-05-12 DIAGNOSIS — R41.3 MEMORY LOSS: ICD-10-CM

## 2021-05-12 RX ORDER — DONEPEZIL HYDROCHLORIDE 5 MG/1
5 TABLET, FILM COATED ORAL EVERY EVENING
Qty: 30 TABLET | Refills: 3 | Status: SHIPPED | OUTPATIENT
Start: 2021-05-12 | End: 2021-06-07 | Stop reason: SDUPTHER

## 2021-05-12 NOTE — TELEPHONE ENCOUNTER
Received request via: Patient    Was the patient seen in the last year in this department? Yes    Does the patient have an active prescription (recently filled or refills available) for medication(s) requested? yes      I do not see a recent encounter for refill on medication, pt has called and requested a refill. Please advise. Thank you.    Derrick

## 2021-05-24 ENCOUNTER — APPOINTMENT (RX ONLY)
Dept: URBAN - METROPOLITAN AREA CLINIC 4 | Facility: CLINIC | Age: 86
Setting detail: DERMATOLOGY
End: 2021-05-24

## 2021-05-24 DIAGNOSIS — L82.1 OTHER SEBORRHEIC KERATOSIS: ICD-10-CM

## 2021-05-24 DIAGNOSIS — T07XXXA ABRASION OR FRICTION BURN OF OTHER, MULTIPLE, AND UNSPECIFIED SITES, WITHOUT MENTION OF INFECTION: ICD-10-CM

## 2021-05-24 DIAGNOSIS — L57.0 ACTINIC KERATOSIS: ICD-10-CM

## 2021-05-24 DIAGNOSIS — L81.4 OTHER MELANIN HYPERPIGMENTATION: ICD-10-CM

## 2021-05-24 DIAGNOSIS — D22 MELANOCYTIC NEVI: ICD-10-CM

## 2021-05-24 PROBLEM — S60.511A ABRASION OF RIGHT HAND, INITIAL ENCOUNTER: Status: ACTIVE | Noted: 2021-05-24

## 2021-05-24 PROBLEM — D22.9 MELANOCYTIC NEVI, UNSPECIFIED: Status: ACTIVE | Noted: 2021-05-24

## 2021-05-24 PROBLEM — S50.812A ABRASION OF LEFT FOREARM, INITIAL ENCOUNTER: Status: ACTIVE | Noted: 2021-05-24

## 2021-05-24 PROBLEM — D48.5 NEOPLASM OF UNCERTAIN BEHAVIOR OF SKIN: Status: ACTIVE | Noted: 2021-05-24

## 2021-05-24 PROCEDURE — 17003 DESTRUCT PREMALG LES 2-14: CPT

## 2021-05-24 PROCEDURE — ? COUNSELING

## 2021-05-24 PROCEDURE — ? LIQUID NITROGEN

## 2021-05-24 PROCEDURE — 17000 DESTRUCT PREMALG LESION: CPT | Mod: 59

## 2021-05-24 PROCEDURE — 11102 TANGNTL BX SKIN SINGLE LES: CPT

## 2021-05-24 PROCEDURE — ? ADDITIONAL NOTES

## 2021-05-24 PROCEDURE — ? BIOPSY BY SHAVE METHOD AND DESTRUCTION

## 2021-05-24 PROCEDURE — 99212 OFFICE O/P EST SF 10 MIN: CPT | Mod: 25

## 2021-05-24 ASSESSMENT — LOCATION SIMPLE DESCRIPTION DERM
LOCATION SIMPLE: LEFT ANTERIOR NECK
LOCATION SIMPLE: RIGHT POSTERIOR THIGH
LOCATION SIMPLE: LEFT POSTERIOR THIGH
LOCATION SIMPLE: RIGHT HAND
LOCATION SIMPLE: NECK
LOCATION SIMPLE: LEFT FOREHEAD
LOCATION SIMPLE: LEFT CALF
LOCATION SIMPLE: LEFT FOREARM
LOCATION SIMPLE: RIGHT TEMPLE
LOCATION SIMPLE: RIGHT FOREHEAD
LOCATION SIMPLE: LEFT CHEEK
LOCATION SIMPLE: LEFT EYEBROW
LOCATION SIMPLE: CHEST

## 2021-05-24 ASSESSMENT — LOCATION DETAILED DESCRIPTION DERM
LOCATION DETAILED: LEFT DISTAL CALF
LOCATION DETAILED: LEFT DISTAL POSTERIOR THIGH
LOCATION DETAILED: RIGHT CENTRAL TEMPLE
LOCATION DETAILED: RIGHT MEDIAL FOREHEAD
LOCATION DETAILED: MIDDLE STERNUM
LOCATION DETAILED: RIGHT MEDIAL SUPERIOR CHEST
LOCATION DETAILED: LEFT PROXIMAL DORSAL FOREARM
LOCATION DETAILED: RIGHT RADIAL DORSAL HAND
LOCATION DETAILED: LEFT DISTAL DORSAL FOREARM
LOCATION DETAILED: LEFT CENTRAL EYEBROW
LOCATION DETAILED: LEFT INFERIOR ANTERIOR NECK
LOCATION DETAILED: LEFT INFERIOR CENTRAL MALAR CHEEK
LOCATION DETAILED: RIGHT DISTAL POSTERIOR THIGH
LOCATION DETAILED: LEFT INFERIOR FOREHEAD
LOCATION DETAILED: LEFT CENTRAL LATERAL NECK

## 2021-05-24 ASSESSMENT — LOCATION ZONE DERM
LOCATION ZONE: NECK
LOCATION ZONE: ARM
LOCATION ZONE: LEG
LOCATION ZONE: FACE
LOCATION ZONE: TRUNK
LOCATION ZONE: HAND

## 2021-05-24 NOTE — PROCEDURE: LIQUID NITROGEN
Duration Of Freeze Thaw-Cycle (Seconds): 2
Render Post-Care Instructions In Note?: no
Consent: The patient's consent was obtained including but not limited to risks of crusting, scabbing, blistering, scarring, darker or lighter pigmentary change, recurrence, incomplete removal and infection.
Detail Level: Simple
Number Of Freeze-Thaw Cycles: 2 freeze-thaw cycles
Post-Care Instructions: I reviewed with the patient in detail post-care instructions. Patient is to wear sunprotection, and avoid picking at any of the treated lesions. Pt may apply Vaseline to crusted or scabbing areas.

## 2021-05-24 NOTE — PROCEDURE: BIOPSY BY SHAVE METHOD AND DESTRUCTION

## 2021-06-07 ENCOUNTER — OFFICE VISIT (OUTPATIENT)
Dept: NEUROLOGY | Facility: MEDICAL CENTER | Age: 86
End: 2021-06-07
Attending: STUDENT IN AN ORGANIZED HEALTH CARE EDUCATION/TRAINING PROGRAM
Payer: MEDICARE

## 2021-06-07 VITALS
WEIGHT: 98.55 LBS | HEART RATE: 82 BPM | BODY MASS INDEX: 18.13 KG/M2 | DIASTOLIC BLOOD PRESSURE: 70 MMHG | SYSTOLIC BLOOD PRESSURE: 104 MMHG | RESPIRATION RATE: 14 BRPM | HEIGHT: 62 IN | TEMPERATURE: 98.4 F | OXYGEN SATURATION: 94 %

## 2021-06-07 DIAGNOSIS — D50.8 OTHER IRON DEFICIENCY ANEMIA: ICD-10-CM

## 2021-06-07 DIAGNOSIS — R41.89 COGNITIVE IMPAIRMENT: ICD-10-CM

## 2021-06-07 DIAGNOSIS — R63.0 POOR APPETITE: ICD-10-CM

## 2021-06-07 DIAGNOSIS — G31.84 MILD COGNITIVE IMPAIRMENT: ICD-10-CM

## 2021-06-07 DIAGNOSIS — F03.90 DEMENTIA WITHOUT BEHAVIORAL DISTURBANCE, UNSPECIFIED DEMENTIA TYPE: ICD-10-CM

## 2021-06-07 DIAGNOSIS — M15.9 OSTEOARTHRITIS OF MULTIPLE JOINTS, UNSPECIFIED OSTEOARTHRITIS TYPE: ICD-10-CM

## 2021-06-07 DIAGNOSIS — R63.4 WEIGHT LOSS: ICD-10-CM

## 2021-06-07 DIAGNOSIS — R41.3 MEMORY LOSS: ICD-10-CM
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
## 2021-06-07 DIAGNOSIS — R26.89 IMBALANCE: ICD-10-CM

## 2021-06-07 PROCEDURE — 99212 OFFICE O/P EST SF 10 MIN: CPT | Performed by: STUDENT IN AN ORGANIZED HEALTH CARE EDUCATION/TRAINING PROGRAM

## 2021-06-07 PROCEDURE — 99214 OFFICE O/P EST MOD 30 MIN: CPT | Performed by: STUDENT IN AN ORGANIZED HEALTH CARE EDUCATION/TRAINING PROGRAM

## 2021-06-07 RX ORDER — MIRTAZAPINE 15 MG/1
15 TABLET, FILM COATED ORAL
Qty: 90 TABLET | Refills: 3 | Status: SHIPPED | OUTPATIENT
Start: 2021-06-07 | End: 2022-01-01

## 2021-06-07 RX ORDER — DONEPEZIL HYDROCHLORIDE 5 MG/1
5 TABLET, FILM COATED ORAL EVERY EVENING
Qty: 90 TABLET | Refills: 3 | Status: SHIPPED | OUTPATIENT
Start: 2021-06-07 | End: 2021-08-03

## 2021-06-07 RX ORDER — MEMANTINE HYDROCHLORIDE 10 MG/1
10 TABLET ORAL 2 TIMES DAILY
Qty: 60 TABLET | Refills: 5 | Status: SHIPPED | OUTPATIENT
Start: 2021-06-07 | End: 2021-09-13

## 2021-06-07 RX ORDER — ALPHA LIPOIC ACID 200 MG
CAPSULE ORAL
COMMUNITY
Start: 2015-03-17 | End: 2021-06-07

## 2021-06-07 ASSESSMENT — FIBROSIS 4 INDEX: FIB4 SCORE: 2.19

## 2021-06-07 NOTE — PROGRESS NOTES
Neurology Clinic - Follow-up Note        Chief complaint: Mild cognitive impairment vs early dementia          Interval History:  Was started on Namenda with Aricept last visit in March, 2021.    She is doing well. Mood is good. She remains active with her pouzzles and regularly socializes with friends. Sleep remains good as well. She regularly exercises. No issues with falling.    Appetite is good with Mirtazepine. Son says Aricept and namenda has stabilzed things.     Mirtazepine is helping with sleep. Sleep the best she has ever done to     Occasional disorientation.     ROS: Pertinent positives and negatives are as documented above          Current medications:     Outpatient Medications Marked as Taking for the 6/7/21 encounter (Office Visit) with Rohit Ceballos M.D.   Medication Sig Dispense Refill   • memantine (NAMENDA) 10 MG Tab Take 1 tablet by mouth 2 times a day. 60 tablet 5   • mirtazapine (REMERON) 15 MG Tab Take 1 tablet by mouth at bedtime. 90 tablet 3   • donepezil (ARICEPT) 5 MG Tab Take 1 tablet by mouth every evening. 90 tablet 3   • Diclofenac Sodium 1 % Gel Apply 0.5-1 g to neck daily as needed. 1 Tube 3   • Acetaminophen (APAP) 325 MG Tab Take 2 Tabs by mouth 2 times a day as needed. 120 Tab         Physical examination:   Vitals:    06/07/21 1017   BP: 104/70   Pulse: 82   Resp: 14   Temp: 36.9 °C (98.4 °F)   SpO2: 94%     Vitals:    06/07/21 1017   BP: 104/70   Pulse: 82   Resp: 14   Temp: 36.9 °C (98.4 °F)   SpO2: 94%       General: Patient in no acute distress, pleasant and cooperative.  HEENT: Normocephalic, no signs of acute trauma.   Neck: limited ROM d/t arthritis  Chest: clear.No cough.   Skin: no signs of acute rashes or trauma.   Musculoskeletal: Normal ROM throughout  Psychiatric:  Denies symptoms of depression or suicidal ideation. Mood and affect appear normal on exam.      NEUROLOGICAL EXAM:   Mental status:  orientation: Awake, alert and oriented to self, month,  year,  Attention: Intact    Frontal release signs: Absent  Speech and language: speech is clear and fluent.  The patient is able to name objects.  Some difficulty with repetition.  Was able to comprehend multistep commands.  Memory: impaired short term recall  Cranial nerve exam:   I: smell Not tested   II: visual acuity  OS: NT   OD: NT   II: visual fields Full to confrontation  Visual neglect: absent   II: pupils Equal, round, reactive to light   III,VII: ptosis None   III,IV,VI: extraocular muscles  Full ROM   V: mastication Normal   V: facial light touch sensation  Normal   V,VII: corneal reflex  Not tested   VII: facial muscle function - upper  Normal   VII: facial muscle function - lower Normal   VIII: hearing  grossly intact   IX: soft palate elevation  Normal                   XII: tongue strength  NT     Motor exam:   • Grossly intact throughout.  Tone normal.  No tremors or abnormal movements  Sensory exam grossly intact    Gait:   • The patient was able to get up from seated position on first attempt without requiring assistance.   • Found to be steady when walking.   • Movements were fluid with normal arm swing.   • The patient was able to turn without difficulties or tendency to fall.   Romberg exam subtlety present     ANCILLARY DATA   REVIEWED:             Results for orders placed during the hospital encounter of 12/24/20   MR-BRAIN-W/O    Impression 1.  Mild to moderate cerebral atrophy and chronic microvascular ischemic type changes.  2.  No acute intracranial abnormality.                          Results for orders placed during the hospital encounter of 01/30/14   MR-CERVICAL SPINE-W/O    Impression 1.  Mild cervical spondylotic changes at the C5-6 and C6-7 levels with minimal spondylotic change at the C4-5 level.    2.  Moderate bilateral neuroforaminal narrowing at C56 and C6-7 levels.                                                                                 No results found for this or  any previous visit.         Lab Data:  Reviewed    Hospital Outpatient Visit on 01/18/2021   Component Date Value   • Vitamin B1 01/18/2021 75    • Vitamin B12 -True Cobala* 01/18/2021 382    • Sodium 01/18/2021 138    • Potassium 01/18/2021 4.4    • Chloride 01/18/2021 100    • Co2 01/18/2021 27    • Anion Gap 01/18/2021 11.0    • Glucose 01/18/2021 87    • Bun 01/18/2021 29*   • Creatinine 01/18/2021 1.31    • Calcium 01/18/2021 10.0    • AST(SGOT) 01/18/2021 25    • ALT(SGPT) 01/18/2021 14    • Alkaline Phosphatase 01/18/2021 71    • Total Bilirubin 01/18/2021 0.7    • Albumin 01/18/2021 4.5    • Total Protein 01/18/2021 7.3    • Globulin 01/18/2021 2.8    • A-G Ratio 01/18/2021 1.6    • WBC 01/18/2021 5.3    • RBC 01/18/2021 4.41    • Hemoglobin 01/18/2021 13.1    • Hematocrit 01/18/2021 41.4    • MCV 01/18/2021 93.9    • MCH 01/18/2021 29.7    • MCHC 01/18/2021 31.6*   • RDW 01/18/2021 48.2    • Platelet Count 01/18/2021 272    • MPV 01/18/2021 9.9    • Neutrophils-Polys 01/18/2021 72.00    • Lymphocytes 01/18/2021 13.10*   • Monocytes 01/18/2021 11.20    • Eosinophils 01/18/2021 2.70    • Basophils 01/18/2021 0.60    • Immature Granulocytes 01/18/2021 0.40    • Nucleated RBC 01/18/2021 0.00    • Neutrophils (Absolute) 01/18/2021 3.78    • Lymphs (Absolute) 01/18/2021 0.69*   • Monos (Absolute) 01/18/2021 0.59    • Eos (Absolute) 01/18/2021 0.14    • Baso (Absolute) 01/18/2021 0.03    • Immature Granulocytes (a* 01/18/2021 0.02    • NRBC (Absolute) 01/18/2021 0.00    • Ferritin 01/18/2021 233.0    • Iron 01/18/2021 79    • Total Iron Binding 01/18/2021 295    • Unsat Iron Binding 01/18/2021 216    • % Saturation 01/18/2021 27    • Fasting Status 01/18/2021 Fasting    • 25-Hydroxy   Vitamin D 25 01/18/2021 45    • GFR If  01/18/2021 46*   • GFR If Non  Ameri* 01/18/2021 38*   Hospital Outpatient Visit on 11/23/2020   Component Date Value   • WBC 11/23/2020 5.5    • RBC 11/23/2020 4.71    •  Hemoglobin 11/23/2020 13.7    • Hematocrit 11/23/2020 42.9    • MCV 11/23/2020 91.1    • MCH 11/23/2020 29.1    • MCHC 11/23/2020 31.9*   • RDW 11/23/2020 45.1    • Platelet Count 11/23/2020 226    • MPV 11/23/2020 9.8    • Neutrophils-Polys 11/23/2020 77.10*   • Lymphocytes 11/23/2020 9.70*   • Monocytes 11/23/2020 11.90    • Eosinophils 11/23/2020 0.50    • Basophils 11/23/2020 0.40    • Immature Granulocytes 11/23/2020 0.40    • Nucleated RBC 11/23/2020 0.00    • Neutrophils (Absolute) 11/23/2020 4.27    • Lymphs (Absolute) 11/23/2020 0.54*   • Monos (Absolute) 11/23/2020 0.66    • Eos (Absolute) 11/23/2020 0.03    • Baso (Absolute) 11/23/2020 0.02    • Immature Granulocytes (a* 11/23/2020 0.02    • NRBC (Absolute) 11/23/2020 0.00    • Sodium 11/23/2020 129*   • Potassium 11/23/2020 4.0    • Chloride 11/23/2020 93*   • Co2 11/23/2020 28    • Anion Gap 11/23/2020 8.0    • Glucose 11/23/2020 96    • Bun 11/23/2020 19    • Creatinine 11/23/2020 1.24    • Calcium 11/23/2020 9.7    • AST(SGOT) 11/23/2020 28    • ALT(SGPT) 11/23/2020 19    • Alkaline Phosphatase 11/23/2020 48    • Total Bilirubin 11/23/2020 0.7    • Albumin 11/23/2020 4.1    • Total Protein 11/23/2020 6.7    • Globulin 11/23/2020 2.6    • A-G Ratio 11/23/2020 1.6    • Vitamin B12 -True Cobala* 11/23/2020 655    • GFR If  11/23/2020 49*   • GFR If Non  Ameri* 11/23/2020 41*   Hospital Outpatient Visit on 11/19/2020   Component Date Value   • Significant Indicator 11/19/2020 NEG    • Source 11/19/2020 UR    • Site 11/19/2020 -    • Culture Result 11/19/2020 Mixed skin gilberto <10,000 cfu/mL    Office Visit on 11/19/2020   Component Date Value   • POC Color 11/19/2020 Yellow    • POC Appearance 11/19/2020 Clear    • POC Leukocyte Esterase 11/19/2020 Negative    • POC Nitrites 11/19/2020 Negative    • POC Urobiligen 11/19/2020 0.2    • POC Protein 11/19/2020 100    • POC Urine PH 11/19/2020 7.0    • POC Blood 11/19/2020 Trace    • POC  Specific Gravity 11/19/2020 1.020    • POC Ketones 11/19/2020 Negative    • POC Bilirubin 11/19/2020 Negative    • POC Glucose 11/19/2020 Negative    Admission on 11/10/2020, Discharged on 11/10/2020   Component Date Value   • WBC 11/10/2020 5.5    • RBC 11/10/2020 5.09    • Hemoglobin 11/10/2020 14.8    • Hematocrit 11/10/2020 44.6    • MCV 11/10/2020 87.6    • MCH 11/10/2020 29.1    • MCHC 11/10/2020 33.2*   • RDW 11/10/2020 41.9    • Platelet Count 11/10/2020 451*   • MPV 11/10/2020 8.9*   • Neutrophils-Polys 11/10/2020 79.00*   • Lymphocytes 11/10/2020 12.40*   • Monocytes 11/10/2020 7.60    • Eosinophils 11/10/2020 0.40    • Basophils 11/10/2020 0.20    • Immature Granulocytes 11/10/2020 0.40    • Nucleated RBC 11/10/2020 0.00    • Neutrophils (Absolute) 11/10/2020 4.35    • Lymphs (Absolute) 11/10/2020 0.68*   • Monos (Absolute) 11/10/2020 0.42    • Eos (Absolute) 11/10/2020 0.02    • Baso (Absolute) 11/10/2020 0.01    • Immature Granulocytes (a* 11/10/2020 0.02    • NRBC (Absolute) 11/10/2020 0.00    • Sodium 11/10/2020 130*   • Potassium 11/10/2020 4.2    • Chloride 11/10/2020 94*   • Co2 11/10/2020 22    • Anion Gap 11/10/2020 14.0    • Glucose 11/10/2020 156*   • Bun 11/10/2020 23*   • Creatinine 11/10/2020 1.03    • Calcium 11/10/2020 10.2    • AST(SGOT) 11/10/2020 21    • ALT(SGPT) 11/10/2020 16    • Alkaline Phosphatase 11/10/2020 49    • Total Bilirubin 11/10/2020 0.9    • Albumin 11/10/2020 4.3    • Total Protein 11/10/2020 7.4    • Globulin 11/10/2020 3.1    • A-G Ratio 11/10/2020 1.4    • Lipase 11/10/2020 77    • GFR If  11/10/2020 >60    • GFR If Non  Ameri* 11/10/2020 51*   Admission on 11/04/2020, Discharged on 11/04/2020   Component Date Value   • WBC 11/04/2020 2.8*   • RBC 11/04/2020 4.65    • Hemoglobin 11/04/2020 13.5    • Hematocrit 11/04/2020 41.4    • MCV 11/04/2020 89.0    • MCH 11/04/2020 29.0    • MCHC 11/04/2020 32.6*   • RDW 11/04/2020 43.4    • Platelet Count  11/04/2020 200    • MPV 11/04/2020 9.0    • Neutrophils-Polys 11/04/2020 80.00*   • Lymphocytes 11/04/2020 13.90*   • Monocytes 11/04/2020 4.30    • Eosinophils 11/04/2020 0.00    • Basophils 11/04/2020 0.00    • Nucleated RBC 11/04/2020 0.00    • Neutrophils (Absolute) 11/04/2020 2.24    • Lymphs (Absolute) 11/04/2020 0.39*   • Monos (Absolute) 11/04/2020 0.12    • Eos (Absolute) 11/04/2020 0.00    • Baso (Absolute) 11/04/2020 0.00    • NRBC (Absolute) 11/04/2020 0.00    • Sodium 11/04/2020 134*   • Potassium 11/04/2020 4.0    • Chloride 11/04/2020 100    • Co2 11/04/2020 22    • Anion Gap 11/04/2020 12.0    • Glucose 11/04/2020 104*   • Bun 11/04/2020 17    • Creatinine 11/04/2020 1.04    • Calcium 11/04/2020 9.2    • AST(SGOT) 11/04/2020 25    • ALT(SGPT) 11/04/2020 14    • Alkaline Phosphatase 11/04/2020 38    • Total Bilirubin 11/04/2020 0.4    • Albumin 11/04/2020 3.6    • Total Protein 11/04/2020 6.6    • Globulin 11/04/2020 3.0    • A-G Ratio 11/04/2020 1.2    • Lipase 11/04/2020 57    • Plt Estimation 11/04/2020 Normal    • RBC Morphology 11/04/2020 Normal    • Peripheral Smear Review 11/04/2020 see below    • Metamyelocytes 11/04/2020 0.90    • Myelocytes 11/04/2020 0.90    • Manual Diff Status 11/04/2020 PERFORMED    • GFR If  11/04/2020 >60    • GFR If Non  Ameri* 11/04/2020 50*   • COVID Order Status 11/04/2020 Received    • SARS-CoV-2 Source 11/04/2020 NP Swab    • SARS-CoV-2 by PCR 11/04/2020 DETECTED*                 ASSESSMENT, PLAN, EDUCATION, AND COUNSELING:  This is a 89-year-old female with  mild dementia of Alzheimer's type.  Most pronounced deficits are short-term memory.  She is clinically stable with no concerns from family. She is doing well on aricept. Will add Namenda for additive benefit. Also discussed importance of staying hydrated. She will try flavoring her water to encourage more drinking. Fu in 3 months.    We had an extensive discussion about the importance  of adhering to healthy lifestyle:    1. Proper Diet: We recommend the Mediterranean diet   2. Proper Vascular Health: Making sure that your primary care provider (PCP) is screening for and treating all vascular risk factors (diabetes, high cholesterol, high blood pressure, and such)  3. Quit smoking, if you smoke   4. Exercise as tolerated with a goal of at least 30 minutes 5 days a week or a total of 150 minutes per week  5. Focus on what you enjoy and remove stress from your life. Meditation is highly recommended.  6. Try learning new hobbies or skills, even if you’re not great at them  7. Regular social interaction: Maintain an active social life as much as possible   8. Keep your brain active with cognitively stimulating activities such as brain games and puzzles  9. Address ALL sleeping issues:  • Get 7-8 hours of sleep per night  • It is crucial to treat sleep apnea   10. Maintain a predictable daily routine  11. Maintain adequate hydration  12. B complex vitamins  13. Vitamin C  14. Vitamin E in food  15. Vitamin D supplementation if deficient  16. Eat fish  17. Dental hygiene  18. Use mccain (curcumin)  19. Consume foods that are good antioxidants:  • Beans  • Berries  • Grape juice  • Pomegranate juice  • Green tea     Encounter Diagnoses   Name Primary?   • Memory loss    • Cognitive impairment    • Mild cognitive impairment    • Dementia without behavioral disturbance, unspecified dementia type (HCC)    • Osteoarthritis of multiple joints, unspecified osteoarthritis type    • Fall, initial encounter    • Imbalance    • Poor appetite    • Weight loss    • Other iron deficiency anemia        Orders Placed This Encounter   • REFERRAL TO HOME HEALTH   • DISCONTD: B Complex Vitamins (B COMPLEX-B12) Tab   • memantine (NAMENDA) 10 MG Tab   • mirtazapine (REMERON) 15 MG Tab   • donepezil (ARICEPT) 5 MG Tab              Rohit Ceballos MD  Epilepsy and General Neurology  Department of Neurology  Instructor of  Neurology [facility].   Office: [phone]  Fax: [phone]     BILLING DOCUMENTATION:       Counseling:  I spent a total of 30 minutes of face-to-face time in this visit. Over 50% of the time of the visit today was spent on counseling and or coordination of care wtih the patient and/or family, as above in assessment in plan.

## 2021-08-03 ENCOUNTER — OFFICE VISIT (OUTPATIENT)
Dept: URGENT CARE | Facility: PHYSICIAN GROUP | Age: 86
End: 2021-08-03
Payer: MEDICARE

## 2021-08-03 VITALS
SYSTOLIC BLOOD PRESSURE: 110 MMHG | TEMPERATURE: 99 F | HEIGHT: 62 IN | RESPIRATION RATE: 16 BRPM | BODY MASS INDEX: 18 KG/M2 | WEIGHT: 97.8 LBS | OXYGEN SATURATION: 96 % | DIASTOLIC BLOOD PRESSURE: 62 MMHG | HEART RATE: 83 BPM

## 2021-08-03 DIAGNOSIS — L30.9 DERMATITIS: ICD-10-CM

## 2021-08-03 DIAGNOSIS — R22.32 NODULE OF FINGER OF LEFT HAND: ICD-10-CM

## 2021-08-03 DIAGNOSIS — R21 RASH AND NONSPECIFIC SKIN ERUPTION: ICD-10-CM

## 2021-08-03 PROCEDURE — 99214 OFFICE O/P EST MOD 30 MIN: CPT | Performed by: PHYSICIAN ASSISTANT

## 2021-08-03 RX ORDER — HYDROCORTISONE CREAM 1% 10 MG/G
CREAM TOPICAL
Qty: 28 G | Refills: 1 | Status: SHIPPED | OUTPATIENT
Start: 2021-08-03 | End: 2022-01-01

## 2021-08-03 ASSESSMENT — ENCOUNTER SYMPTOMS
SENSORY CHANGE: 0
DIZZINESS: 0
COUGH: 0
FEVER: 0
NAUSEA: 0
SHORTNESS OF BREATH: 0
CHILLS: 0
VOMITING: 0

## 2021-08-03 ASSESSMENT — FIBROSIS 4 INDEX: FIB4 SCORE: 2.19

## 2021-08-03 NOTE — PROGRESS NOTES
"Subjective:   Lela Orozco  is a 89 y.o. female who presents for Rash (Rash on forehead and around eyes, red, dry, small red bumps, eyes watery, x4 days )      Rash  Pertinent negatives include no cough, fever, shortness of breath or vomiting.   Patient presents to urgent care with caregiver present.  Notes rash that has progressed from left forehead down covering both sides and forehead and just below eyes to cheeks.  Notes diffuse redness and slight dry appearing skin.  She denies itch or pain associated.  Denies new products tried to skin prior to onset.  Caregiver mentions patient had been using another family members psoriasis cream prior to rash worsening but not prior to onset of rash around 1 week ago.  Patient denies any involvement of eyes.  Denies redness to the eyes denies itching or pain.  Denies drainage or discharge from eyes.  Patient has had a number of skin biopsies from dermatology for skin lesions on face and does have a follow-up with dermatology in around 3 weeks.    Additionally patient complains of a firm palpable nodule on side of index finger on left hand.  She denies trauma or injury.  Denies pain or drainage associated.  Denies catching of digit.  Denies numbness tingling or weakness.    Review of Systems   Constitutional: Negative for chills and fever.   Respiratory: Negative for cough and shortness of breath.    Gastrointestinal: Negative for nausea and vomiting.   Musculoskeletal:        Left hand index finger firm nodule   Skin: Positive for rash ( Facial). Negative for itching.   Neurological: Negative for dizziness and sensory change.       Allergies   Allergen Reactions   • Hydrocodone-Acetaminophen Nausea        Objective:   /62 (BP Location: Left arm, Patient Position: Sitting, BP Cuff Size: Adult)   Pulse 83   Temp 37.2 °C (99 °F) (Temporal)   Resp 16   Ht 1.575 m (5' 2\")   Wt 44.4 kg (97 lb 12.8 oz)   SpO2 96%   BMI 17.89 kg/m²     Physical Exam  Vitals and " nursing note reviewed.   Constitutional:       General: She is not in acute distress.     Appearance: She is well-developed. She is not diaphoretic.   HENT:      Head: Normocephalic and atraumatic.        Comments: Cheeks and forehead with erythematous slightly raised and thickened appearing rash, dry skin with trace scaling at edge of hairline, no breakage of skin, no satellite lesions, nonvesicular, nonulcerative, nonurticarial, nonpustular in appearance, upper and lower eyelids involved, normal exam of eyes     Right Ear: External ear normal.      Left Ear: External ear normal.      Nose: Nose normal.   Eyes:      General: Lids are normal. No scleral icterus.        Right eye: No discharge.         Left eye: No discharge.      Conjunctiva/sclera: Conjunctivae normal.   Pulmonary:      Effort: Pulmonary effort is normal. No respiratory distress.   Musculoskeletal:         General: Normal range of motion.        Hands:       Cervical back: Neck supple.   Skin:     General: Skin is warm and dry.      Coloration: Skin is not pale.      Findings: No erythema.   Neurological:      Mental Status: She is alert and oriented to person, place, and time. She is not disoriented.   Psychiatric:         Speech: Speech normal.         Behavior: Behavior normal.         Assessment/Plan:   1. Nodule of finger of left hand    2. Dermatitis  - Hydrocortisone Acetate 1 % Cream; Apply to affected area twice daily  Dispense: 28 g; Refill: 1    3. Rash and nonspecific skin eruption    -Apply topical hydrocortisone cream, okay to alternate with hypoallergenic emollient, return to clinic or follow-up with PCP/dermatology with any worsening whatsoever    -Orthopedic referral for finger nodule, may recommend excision and removal w/ pathology  Return to clinic with lack of resolution or progression of symptoms.  ER precautions with any worsening symptoms are reviewed with patient/caregiver and they do express understanding    I have worn an  N95 mask, gloves and eye protection for the entire encounter with this patient.     Differential diagnosis, natural history, supportive care, and indications for immediate follow-up discussed.

## 2021-08-23 ENCOUNTER — APPOINTMENT (RX ONLY)
Dept: URBAN - METROPOLITAN AREA CLINIC 4 | Facility: CLINIC | Age: 86
Setting detail: DERMATOLOGY
End: 2021-08-23

## 2021-08-23 DIAGNOSIS — L21.8 OTHER SEBORRHEIC DERMATITIS: ICD-10-CM

## 2021-08-23 DIAGNOSIS — L72.8 OTHER FOLLICULAR CYSTS OF THE SKIN AND SUBCUTANEOUS TISSUE: ICD-10-CM

## 2021-08-23 DIAGNOSIS — Z86.007 PERSONAL HISTORY OF IN-SITU NEOPLASM OF SKIN: ICD-10-CM

## 2021-08-23 PROBLEM — Z85.828 PERSONAL HISTORY OF OTHER MALIGNANT NEOPLASM OF SKIN: Status: ACTIVE | Noted: 2021-08-23

## 2021-08-23 PROBLEM — D48.5 NEOPLASM OF UNCERTAIN BEHAVIOR OF SKIN: Status: ACTIVE | Noted: 2021-08-23

## 2021-08-23 PROCEDURE — ? DIAGNOSIS COMMENT

## 2021-08-23 PROCEDURE — 99213 OFFICE O/P EST LOW 20 MIN: CPT | Mod: 25

## 2021-08-23 PROCEDURE — ? ADDITIONAL NOTES

## 2021-08-23 PROCEDURE — ? COUNSELING

## 2021-08-23 PROCEDURE — ? BIOPSY BY SHAVE METHOD

## 2021-08-23 PROCEDURE — 11102 TANGNTL BX SKIN SINGLE LES: CPT

## 2021-08-23 ASSESSMENT — LOCATION DETAILED DESCRIPTION DERM
LOCATION DETAILED: LEFT DISTAL PALMAR INDEX FINGER
LOCATION DETAILED: LEFT PROXIMAL CALF
LOCATION DETAILED: RIGHT LATERAL TEMPLE

## 2021-08-23 ASSESSMENT — LOCATION SIMPLE DESCRIPTION DERM
LOCATION SIMPLE: RIGHT TEMPLE
LOCATION SIMPLE: LEFT CALF
LOCATION SIMPLE: LEFT INDEX FINGER

## 2021-08-23 ASSESSMENT — LOCATION ZONE DERM
LOCATION ZONE: FINGER
LOCATION ZONE: FACE
LOCATION ZONE: LEG

## 2021-08-23 NOTE — PROCEDURE: BIOPSY BY SHAVE METHOD
Detail Level: Detailed
Depth Of Biopsy: dermis
Was A Bandage Applied: Yes
Size Of Lesion In Cm: 0
Biopsy Type: H and E
Biopsy Method: Personna blade
Anesthesia Type: 1% lidocaine with epinephrine
Anesthesia Volume In Cc: 2
Hemostasis: Drysol
Wound Care: Bacitracin
Dressing: bandage
Type Of Destruction Used: Electrodesiccation
Curettage Text: The wound bed was treated with curettage after the biopsy was performed.
Cryotherapy Text: The wound bed was treated with cryotherapy after the biopsy was performed.
Electrodesiccation Text: The wound bed was treated with electrodesiccation after the biopsy was performed.
Electrodesiccation And Curettage Text: The wound bed was treated with electrodesiccation and curettage after the biopsy was performed.
Silver Nitrate Text: The wound bed was treated with silver nitrate after the biopsy was performed.
Lab: [id number]
Lab Facility: 
Render Path Notes In Note?: No
Consent: Written consent was obtained and risks were reviewed including but not limited to scarring, infection, bleeding, scabbing, incomplete removal, nerve damage and allergy to anesthesia.
Post-Care Instructions: I reviewed with the patient in detail post-care instructions. Patient is to keep the biopsy site dry overnight, and then apply bacitracin twice daily until healed. Patient may apply hydrogen peroxide soaks to remove any crusting.
Notification Instructions: Patient will be notified of biopsy results. However, patient instructed to call the office if not contacted within 2 weeks.
Billing Type: Third-Party Bill
Information: Selecting Yes will display possible errors in your note based on the variables you have selected. This validation is only offered as a suggestion for you. PLEASE NOTE THAT THE VALIDATION TEXT WILL BE REMOVED WHEN YOU FINALIZE YOUR NOTE. IF YOU WANT TO FAX A PRELIMINARY NOTE YOU WILL NEED TO TOGGLE THIS TO 'NO' IF YOU DO NOT WANT IT IN YOUR FAXED NOTE.

## 2021-08-23 NOTE — PROCEDURE: ADDITIONAL NOTES
Additional Notes: Recommended to see hand surgeon for removal\\n1.7 cm
Render Risk Assessment In Note?: no
Detail Level: Simple

## 2021-08-23 NOTE — PROCEDURE: DIAGNOSIS COMMENT
Render Risk Assessment In Note?: no
Detail Level: Detailed
Comment: [id number], hx of SCC Insitu. Treated by C&D at time of biopsy.

## 2021-08-26 ENCOUNTER — RX ONLY (OUTPATIENT)
Age: 86
Setting detail: RX ONLY
End: 2021-08-26

## 2021-08-26 RX ORDER — FLUOROURACIL 2 G/40G
5% CREAM TOPICAL
Qty: 1 | Refills: 0 | Status: ERX | COMMUNITY
Start: 2021-08-25

## 2021-09-13 ENCOUNTER — OFFICE VISIT (OUTPATIENT)
Dept: NEUROLOGY | Facility: MEDICAL CENTER | Age: 86
End: 2021-09-13
Attending: STUDENT IN AN ORGANIZED HEALTH CARE EDUCATION/TRAINING PROGRAM
Payer: MEDICARE

## 2021-09-13 VITALS — WEIGHT: 98.11 LBS | HEIGHT: 62 IN | BODY MASS INDEX: 18.05 KG/M2 | TEMPERATURE: 97 F

## 2021-09-13 DIAGNOSIS — R63.4 WEIGHT LOSS: ICD-10-CM

## 2021-09-13 DIAGNOSIS — F03.90 DEMENTIA WITHOUT BEHAVIORAL DISTURBANCE, UNSPECIFIED DEMENTIA TYPE: ICD-10-CM

## 2021-09-13 DIAGNOSIS — R63.0 POOR APPETITE: ICD-10-CM

## 2021-09-13 DIAGNOSIS — R25.2 LEG CRAMPS: ICD-10-CM

## 2021-09-13 DIAGNOSIS — M47.812 CERVICAL SPINE ARTHRITIS: ICD-10-CM

## 2021-09-13 DIAGNOSIS — R41.89 COGNITIVE IMPAIRMENT: ICD-10-CM

## 2021-09-13 DIAGNOSIS — R41.3 MEMORY LOSS: ICD-10-CM

## 2021-09-13 DIAGNOSIS — R26.89 IMBALANCE: ICD-10-CM

## 2021-09-13 PROCEDURE — 99211 OFF/OP EST MAY X REQ PHY/QHP: CPT | Performed by: STUDENT IN AN ORGANIZED HEALTH CARE EDUCATION/TRAINING PROGRAM

## 2021-09-13 PROCEDURE — 99214 OFFICE O/P EST MOD 30 MIN: CPT | Performed by: STUDENT IN AN ORGANIZED HEALTH CARE EDUCATION/TRAINING PROGRAM

## 2021-09-13 RX ORDER — DONEPEZIL HYDROCHLORIDE 10 MG/1
10 TABLET, FILM COATED ORAL EVERY EVENING
Qty: 90 TABLET | Refills: 3 | Status: SHIPPED | OUTPATIENT
Start: 2021-09-13 | End: 2022-01-01 | Stop reason: SDUPTHER

## 2021-09-13 RX ORDER — MEMANTINE HYDROCHLORIDE 28 MG/1
28 CAPSULE, EXTENDED RELEASE ORAL DAILY
Qty: 90 CAPSULE | Refills: 2 | Status: SHIPPED | OUTPATIENT
Start: 2021-09-13 | End: 2021-10-07

## 2021-09-13 RX ORDER — BENZOCAINE/MENTHOL 6 MG-10 MG
LOZENGE MUCOUS MEMBRANE
COMMUNITY
Start: 2021-08-03 | End: 2021-09-21

## 2021-09-13 RX ORDER — DONEPEZIL HYDROCHLORIDE 5 MG/1
TABLET, FILM COATED ORAL EVERY EVENING
COMMUNITY
Start: 2021-09-07 | End: 2021-09-13

## 2021-09-13 ASSESSMENT — FIBROSIS 4 INDEX: FIB4 SCORE: 2.19

## 2021-09-13 NOTE — PROGRESS NOTES
Neurology Clinic - Follow-up Note        Chief complaint: Mild cognitive impairment vs early dementia          Interval History:  Was started on Namenda with Aricept last visit in March, 2021.    She is doing well. Mood is good. She remains active with her pouzzles and regularly socializes with friends. Sleep remains good as well. She regularly exercises. No issues with falling.    Appetite is good with Mirtazepine. Son says Aricept and namenda has stabilzed things.     Mirtazepine is helping with sleep. Sleep the best she has ever done to     Occasional disorientation.     ROS: Pertinent positives and negatives are as documented above.    INTERVAL HISTORY 09/13/21 :  • Sleep is good.  Mirtazapine helping with sleep and appetite.  • Short term memory problem  • Home health with PT/OT/SLP have been helpful             Current medications:     Outpatient Medications Marked as Taking for the 9/13/21 encounter (Office Visit) with Rohit Ceballos M.D.   Medication Sig Dispense Refill   • donepezil (ARICEPT) 10 MG tablet Take 1 Tablet by mouth every evening. 90 Tablet 3   • Memantine HCl ER (NAMENDA) 28 MG CAPSULE SR 24 HR Take 1 Capsule by mouth every day. 90 Capsule 2   • Hydrocortisone Acetate 1 % Cream Apply to affected area twice daily 28 g 1   • mirtazapine (REMERON) 15 MG Tab Take 1 tablet by mouth at bedtime. 90 tablet 3        Physical examination:   Vitals:    09/13/21 1016   Temp: 36.1 °C (97 °F)     Vitals:    09/13/21 1016   Temp: 36.1 °C (97 °F)       General: Patient in no acute distress, pleasant and cooperative.  HEENT: Normocephalic, no signs of acute trauma.   Neck: limited ROM d/t arthritis  Chest: clear.No cough.   Skin: no signs of acute rashes or trauma.   Musculoskeletal: Normal ROM throughout  Psychiatric:  Denies symptoms of depression or suicidal ideation. Mood and affect appear normal on exam.      NEUROLOGICAL EXAM:   Mental status:  orientation: Awake, alert and oriented to self,   Attention:  Intact    Frontal release signs: Absent  Speech and language: speech is clear and fluent.    Memory: impaired short term recall  Cranial nerve exam:   I: smell Not tested   II: visual acuity  OS: NT   OD: NT   II: visual fields Full to confrontation  Visual neglect: absent   II: pupils Equal, round, reactive to light   III,VII: ptosis None   III,IV,VI: extraocular muscles  Full ROM   V: mastication Normal   V: facial light touch sensation  Normal   V,VII: corneal reflex  Not tested   VII: facial muscle function - upper  Normal   VII: facial muscle function - lower Normal   VIII: hearing  grossly intact   IX: soft palate elevation  NT                   XII: tongue strength  NT     Motor exam:   • Grossly intact throughout.  Tone normal.  No tremors or abnormal movements  Sensory exam grossly intact    Gait:   • The patient was able to get up from seated position on first attempt without requiring assistance.   • Found to be steady, slow when walking.   • Movements were fluid with normal arm swing.   • The patient was able to turn without difficulties or tendency to fall.        ANCILLARY DATA   REVIEWED:             Results for orders placed during the hospital encounter of 12/24/20   MR-BRAIN-W/O    Impression 1.  Mild to moderate cerebral atrophy and chronic microvascular ischemic type changes.  2.  No acute intracranial abnormality.                          Results for orders placed during the hospital encounter of 01/30/14   MR-CERVICAL SPINE-W/O    Impression 1.  Mild cervical spondylotic changes at the C5-6 and C6-7 levels with minimal spondylotic change at the C4-5 level.    2.  Moderate bilateral neuroforaminal narrowing at C56 and C6-7 levels.                ASSESSMENT, PLAN, EDUCATION, AND COUNSELING:  This is a 89-year-old female with  mild dementia of Alzheimer's type.  Most pronounced deficits are short-term memory.  She is clinically stable with no major concerns from family. She is doing well on  aricept and Namenda. Will increase both. Namenda to 28 mg XR, Aricept to 10 mg QHS.      C/w Mrtazepine to help with sleep, mood, and appetite.    C/w flavoring her water to encourage more drinking.     Will plan updated MOCA next visit    Fu in 3 months.    We had an extensive discussion about the importance of adhering to healthy lifestyle:    1. Proper Diet: We recommend the Mediterranean diet   2. Proper Vascular Health: Making sure that your primary care provider (PCP) is screening for and treating all vascular risk factors (diabetes, high cholesterol, high blood pressure, and such)  3. Quit smoking, if you smoke   4. Exercise as tolerated with a goal of at least 30 minutes 5 days a week or a total of 150 minutes per week  5. Focus on what you enjoy and remove stress from your life. Meditation is highly recommended.  6. Try learning new hobbies or skills, even if you’re not great at them  7. Regular social interaction: Maintain an active social life as much as possible   8. Keep your brain active with cognitively stimulating activities such as brain games and puzzles  9. Address ALL sleeping issues:  • Get 7-8 hours of sleep per night  • It is crucial to treat sleep apnea   10. Maintain a predictable daily routine  11. Maintain adequate hydration  12. B complex vitamins  13. Vitamin C  14. Vitamin E in food  15. Vitamin D supplementation if deficient  16. Eat fish  17. Dental hygiene  18. Use mccain (curcumin)  19. Consume foods that are good antioxidants:  • Beans  • Berries  • Grape juice  • Pomegranate juice  • Green tea     Encounter Diagnoses   Name Primary?   • Memory loss    • Dementia without behavioral disturbance, unspecified dementia type (HCC)    • Cognitive impairment    • Imbalance    • Weight loss    • Poor appetite    • Cervical spine arthritis    • Leg cramps        Orders Placed This Encounter   • hydrocortisone 1 % Cream   • DISCONTD: donepezil (ARICEPT) 5 MG Tab   • donepezil (ARICEPT) 10 MG  tablet   • Memantine HCl ER (NAMENDA) 28 MG CAPSULE SR 24 HR              Rohit Ceballos MD  Epilepsy and General Neurology  Department of Neurology  Instructor of Neurology [facility].   Office: [phone]  Fax: [phone]     BILLING DOCUMENTATION:       Counseling:  I spent a total of 35 minutes of face-to-face time in this visit. Over 50% of the time of the visit today was spent on counseling and or coordination of care wtih the patient and/or family, as above in assessment in plan.

## 2021-09-13 NOTE — PATIENT INSTRUCTIONS
Neurology Clinic Visit     IMPORTANT: If imaging, procedure(s), AND/or referrals were placed for you:  Contact [facility] Scheduling if you have not heard from them in 5 day: [phone]    Outpatient [facility] Imaging Sites.  • [address]  Mon-Fri 8am-5pm   • [address] (Breast Center) Mon-Fri 7am-4pm    • [address]  Mon-Fri 8am-5pm  • [facility] Radiology 7am-6:30pm  • [address] Mon-Fri 8am-7pm  Sat 9am-6pm   • [address]  Mon-Fri 8am-7pm and Sat/Sun 9am-5pm  • [address]  Mon-Fri 8am-5pm  • [address] (PET/CT)  Mon-Fri 8:30am-4:30pm     If labs were ordered for you:  • To Schedule an appointment for lab services, please call [phone] or visit [email or web address].  • [facility] Lab Services Convenient Locations:    [place]: • [address] (Ground Floor)  • [address] (Admitting Entrance)  • [address]  • [address]  • [address] [facility], [address]  • [address]  • [address]: • [address]  • [address]       [place]: • [address]  • [address]     [facility] Advanced Medicine     [address] [address]     Laboratory Hours: 6:30am - 6pm  Monday - Friday,   7am - 2pm Saturday    [facility] Medical [facility]      [address] [place] [address]      Laboratory Hours:  7:30am - 4pm  Monday - Friday,  9am - 3pm Saturday    [facility]     [address]    [address] [place] [address]      Laboratory Hours:  7:00am - 5:30pm  Monday - Friday    [facility]      [address] [address]       Laboratory Hours:  7:30am - 4:30pm  Monday - Friday    [facility]       [address] [place] [address]       Laboratory Hours:  8am - 5pm  Monday - Friday    [facility]       [address] [place] [address]        Laboratory hours:  7:30am - 4pm  Monday - Friday    GENERAL REMINDERS:  · Request refills 1 week in advance to ensure you do not run out of medications  · All diagnostic study results will be reviewed at your next visit, UNLESS there are urgent results that need to be acted on sooner.  · Please remember that it is the your responsibility to check with your Insurance for benefit coverage for visit / visits.  · 24 hours notice is required for all appointment changes or cancellation.  · Please arrive 20 min. before your appointment time  · Please note that because Dr. Ceballos has high daily clinic volume, he is unable to accommodate late arrivals. If you are more than 15 minutes late for the scheduled appointment you will be asked to reschedule  · Please bring the following with you:  1) Picture ID  2) Insurance card  3) An updated list of ALL your medications and their dosages (prescribed medications, over the counter medications, and all supplements), as well as allergies with you at all times  4) A list of questions, concerns, comments that you wish to discuss with Dr. Tucker Ceballos MD  General Neurologist and Epileptologist  Department of Neurology  Instructor of Clinical Neurology [facility].

## 2021-09-17 PROBLEM — R22.32 MASS OF FINGER OF LEFT HAND: Status: ACTIVE | Noted: 2021-09-17

## 2021-09-21 ENCOUNTER — PRE-ADMISSION TESTING (OUTPATIENT)
Dept: ADMISSIONS | Facility: MEDICAL CENTER | Age: 86
End: 2021-09-21
Attending: ORTHOPAEDIC SURGERY
Payer: MEDICARE

## 2021-09-21 DIAGNOSIS — Z01.812 PRE-OPERATIVE LABORATORY EXAMINATION: ICD-10-CM

## 2021-09-21 LAB
ANION GAP SERPL CALC-SCNC: 11 MMOL/L (ref 7–16)
BUN SERPL-MCNC: 24 MG/DL (ref 8–22)
CALCIUM SERPL-MCNC: 10.1 MG/DL (ref 8.5–10.5)
CHLORIDE SERPL-SCNC: 98 MMOL/L (ref 96–112)
CO2 SERPL-SCNC: 27 MMOL/L (ref 20–33)
CREAT SERPL-MCNC: 1.09 MG/DL (ref 0.5–1.4)
ERYTHROCYTE [DISTWIDTH] IN BLOOD BY AUTOMATED COUNT: 42.7 FL (ref 35.9–50)
GLUCOSE SERPL-MCNC: 94 MG/DL (ref 65–99)
HCT VFR BLD AUTO: 44.8 % (ref 37–47)
HGB BLD-MCNC: 14.3 G/DL (ref 12–16)
MCH RBC QN AUTO: 28.4 PG (ref 27–33)
MCHC RBC AUTO-ENTMCNC: 31.9 G/DL (ref 33.6–35)
MCV RBC AUTO: 88.9 FL (ref 81.4–97.8)
PLATELET # BLD AUTO: 250 K/UL (ref 164–446)
PMV BLD AUTO: 9.9 FL (ref 9–12.9)
POTASSIUM SERPL-SCNC: 5.2 MMOL/L (ref 3.6–5.5)
RBC # BLD AUTO: 5.04 M/UL (ref 4.2–5.4)
SODIUM SERPL-SCNC: 136 MMOL/L (ref 135–145)
WBC # BLD AUTO: 5.6 K/UL (ref 4.8–10.8)

## 2021-09-21 PROCEDURE — 36415 COLL VENOUS BLD VENIPUNCTURE: CPT

## 2021-09-21 PROCEDURE — 80048 BASIC METABOLIC PNL TOTAL CA: CPT

## 2021-09-21 PROCEDURE — 85027 COMPLETE CBC AUTOMATED: CPT

## 2021-09-21 ASSESSMENT — FIBROSIS 4 INDEX: FIB4 SCORE: 2.19

## 2021-09-22 NOTE — OR NURSING
Patient called back, did not report any new COVID symptoms, has not been out of country. Has not been in contact with anyone who has COVID.  Updated regarding visitor status and mask rules.

## 2021-09-23 ENCOUNTER — ANESTHESIA EVENT (OUTPATIENT)
Dept: SURGERY | Facility: MEDICAL CENTER | Age: 86
End: 2021-09-23
Payer: MEDICARE

## 2021-09-23 ENCOUNTER — ANESTHESIA (OUTPATIENT)
Dept: SURGERY | Facility: MEDICAL CENTER | Age: 86
End: 2021-09-23
Payer: MEDICARE

## 2021-09-23 ENCOUNTER — HOSPITAL ENCOUNTER (OUTPATIENT)
Facility: MEDICAL CENTER | Age: 86
Setting detail: OUTPATIENT SURGERY
End: 2021-09-23
Attending: ORTHOPAEDIC SURGERY | Admitting: ORTHOPAEDIC SURGERY
Payer: MEDICARE

## 2021-09-23 VITALS
HEART RATE: 73 BPM | SYSTOLIC BLOOD PRESSURE: 159 MMHG | DIASTOLIC BLOOD PRESSURE: 84 MMHG | RESPIRATION RATE: 18 BRPM | BODY MASS INDEX: 18.74 KG/M2 | TEMPERATURE: 97.3 F | HEIGHT: 62 IN | WEIGHT: 101.85 LBS | OXYGEN SATURATION: 98 %

## 2021-09-23 DIAGNOSIS — R22.32 MASS OF FINGER OF LEFT HAND: ICD-10-CM

## 2021-09-23 LAB
EXTERNAL QUALITY CONTROL: NORMAL
PATHOLOGY CONSULT NOTE: NORMAL
SARS-COV+SARS-COV-2 AG RESP QL IA.RAPID: NEGATIVE

## 2021-09-23 PROCEDURE — 700101 HCHG RX REV CODE 250: Performed by: ORTHOPAEDIC SURGERY

## 2021-09-23 PROCEDURE — 160046 HCHG PACU - 1ST 60 MINS PHASE II: Performed by: ORTHOPAEDIC SURGERY

## 2021-09-23 PROCEDURE — 160035 HCHG PACU - 1ST 60 MINS PHASE I: Performed by: ORTHOPAEDIC SURGERY

## 2021-09-23 PROCEDURE — 700105 HCHG RX REV CODE 258: Performed by: ORTHOPAEDIC SURGERY

## 2021-09-23 PROCEDURE — 501838 HCHG SUTURE GENERAL: Performed by: ORTHOPAEDIC SURGERY

## 2021-09-23 PROCEDURE — 160026 HCHG SURGERY MINUTES - 1ST 30 MINS LEVEL 1: Performed by: ORTHOPAEDIC SURGERY

## 2021-09-23 PROCEDURE — 87426 SARSCOV CORONAVIRUS AG IA: CPT | Performed by: ORTHOPAEDIC SURGERY

## 2021-09-23 PROCEDURE — 700111 HCHG RX REV CODE 636 W/ 250 OVERRIDE (IP): Performed by: ANESTHESIOLOGY

## 2021-09-23 PROCEDURE — 160025 RECOVERY II MINUTES (STATS): Performed by: ORTHOPAEDIC SURGERY

## 2021-09-23 PROCEDURE — A9270 NON-COVERED ITEM OR SERVICE: HCPCS | Performed by: ANESTHESIOLOGY

## 2021-09-23 PROCEDURE — 160009 HCHG ANES TIME/MIN: Performed by: ORTHOPAEDIC SURGERY

## 2021-09-23 PROCEDURE — 88304 TISSUE EXAM BY PATHOLOGIST: CPT

## 2021-09-23 PROCEDURE — 700102 HCHG RX REV CODE 250 W/ 637 OVERRIDE(OP): Performed by: ANESTHESIOLOGY

## 2021-09-23 PROCEDURE — 160048 HCHG OR STATISTICAL LEVEL 1-5: Performed by: ORTHOPAEDIC SURGERY

## 2021-09-23 PROCEDURE — 160002 HCHG RECOVERY MINUTES (STAT): Performed by: ORTHOPAEDIC SURGERY

## 2021-09-23 PROCEDURE — 700101 HCHG RX REV CODE 250: Performed by: ANESTHESIOLOGY

## 2021-09-23 PROCEDURE — 26116 EXC HAND TUM DEEP < 1.5 CM: CPT | Mod: F1 | Performed by: ORTHOPAEDIC SURGERY

## 2021-09-23 RX ORDER — CELECOXIB 200 MG/1
200 CAPSULE ORAL ONCE
Status: COMPLETED | OUTPATIENT
Start: 2021-09-23 | End: 2021-09-23

## 2021-09-23 RX ORDER — SODIUM CHLORIDE, SODIUM LACTATE, POTASSIUM CHLORIDE, CALCIUM CHLORIDE 600; 310; 30; 20 MG/100ML; MG/100ML; MG/100ML; MG/100ML
INJECTION, SOLUTION INTRAVENOUS CONTINUOUS
Status: DISCONTINUED | OUTPATIENT
Start: 2021-09-23 | End: 2021-09-23 | Stop reason: HOSPADM

## 2021-09-23 RX ORDER — LIDOCAINE HYDROCHLORIDE 10 MG/ML
INJECTION, SOLUTION INFILTRATION; PERINEURAL
Status: DISCONTINUED | OUTPATIENT
Start: 2021-09-23 | End: 2021-09-23 | Stop reason: HOSPADM

## 2021-09-23 RX ORDER — HALOPERIDOL 5 MG/ML
1 INJECTION INTRAMUSCULAR
Status: DISCONTINUED | OUTPATIENT
Start: 2021-09-23 | End: 2021-09-23 | Stop reason: HOSPADM

## 2021-09-23 RX ORDER — LIDOCAINE HYDROCHLORIDE 20 MG/ML
INJECTION, SOLUTION EPIDURAL; INFILTRATION; INTRACAUDAL; PERINEURAL PRN
Status: DISCONTINUED | OUTPATIENT
Start: 2021-09-23 | End: 2021-09-23 | Stop reason: SURG

## 2021-09-23 RX ORDER — CEFAZOLIN SODIUM 1 G/3ML
INJECTION, POWDER, FOR SOLUTION INTRAMUSCULAR; INTRAVENOUS PRN
Status: DISCONTINUED | OUTPATIENT
Start: 2021-09-23 | End: 2021-09-23 | Stop reason: SURG

## 2021-09-23 RX ORDER — BUPIVACAINE HYDROCHLORIDE 5 MG/ML
INJECTION, SOLUTION EPIDURAL; INTRACAUDAL
Status: DISCONTINUED | OUTPATIENT
Start: 2021-09-23 | End: 2021-09-23 | Stop reason: HOSPADM

## 2021-09-23 RX ORDER — MEPERIDINE HYDROCHLORIDE 25 MG/ML
6.25 INJECTION INTRAMUSCULAR; INTRAVENOUS; SUBCUTANEOUS
Status: DISCONTINUED | OUTPATIENT
Start: 2021-09-23 | End: 2021-09-23 | Stop reason: HOSPADM

## 2021-09-23 RX ORDER — ONDANSETRON 2 MG/ML
4 INJECTION INTRAMUSCULAR; INTRAVENOUS
Status: DISCONTINUED | OUTPATIENT
Start: 2021-09-23 | End: 2021-09-23 | Stop reason: HOSPADM

## 2021-09-23 RX ORDER — DIPHENHYDRAMINE HYDROCHLORIDE 50 MG/ML
12.5 INJECTION INTRAMUSCULAR; INTRAVENOUS
Status: DISCONTINUED | OUTPATIENT
Start: 2021-09-23 | End: 2021-09-23 | Stop reason: HOSPADM

## 2021-09-23 RX ORDER — CEFAZOLIN SODIUM 1 G/3ML
2 INJECTION, POWDER, FOR SOLUTION INTRAMUSCULAR; INTRAVENOUS ONCE
Status: DISCONTINUED | OUTPATIENT
Start: 2021-09-23 | End: 2021-09-23 | Stop reason: HOSPADM

## 2021-09-23 RX ORDER — LIDOCAINE HYDROCHLORIDE 10 MG/ML
INJECTION, SOLUTION INFILTRATION; PERINEURAL
Status: DISCONTINUED
Start: 2021-09-23 | End: 2021-09-23 | Stop reason: HOSPADM

## 2021-09-23 RX ORDER — BUPIVACAINE HYDROCHLORIDE 5 MG/ML
INJECTION, SOLUTION EPIDURAL; INTRACAUDAL
Status: DISCONTINUED
Start: 2021-09-23 | End: 2021-09-23 | Stop reason: HOSPADM

## 2021-09-23 RX ORDER — LIDOCAINE HYDROCHLORIDE 10 MG/ML
INJECTION, SOLUTION EPIDURAL; INFILTRATION; INTRACAUDAL; PERINEURAL
Status: DISCONTINUED
Start: 2021-09-23 | End: 2021-09-23 | Stop reason: HOSPADM

## 2021-09-23 RX ORDER — ACETAMINOPHEN 500 MG
1000 TABLET ORAL ONCE
Status: COMPLETED | OUTPATIENT
Start: 2021-09-23 | End: 2021-09-23

## 2021-09-23 RX ORDER — LORAZEPAM 2 MG/ML
0.5 INJECTION INTRAMUSCULAR
Status: DISCONTINUED | OUTPATIENT
Start: 2021-09-23 | End: 2021-09-23 | Stop reason: HOSPADM

## 2021-09-23 RX ADMIN — LIDOCAINE HYDROCHLORIDE 20 MG: 20 INJECTION, SOLUTION EPIDURAL; INFILTRATION; INTRACAUDAL at 07:02

## 2021-09-23 RX ADMIN — SODIUM CHLORIDE, POTASSIUM CHLORIDE, SODIUM LACTATE AND CALCIUM CHLORIDE: 600; 310; 30; 20 INJECTION, SOLUTION INTRAVENOUS at 07:00

## 2021-09-23 RX ADMIN — PROPOFOL 20 MG: 10 INJECTION, EMULSION INTRAVENOUS at 07:02

## 2021-09-23 RX ADMIN — ACETAMINOPHEN 1000 MG: 500 TABLET ORAL at 06:46

## 2021-09-23 RX ADMIN — CELECOXIB 200 MG: 200 CAPSULE ORAL at 06:48

## 2021-09-23 RX ADMIN — CEFAZOLIN 1 G: 330 INJECTION, POWDER, FOR SOLUTION INTRAMUSCULAR; INTRAVENOUS at 07:03

## 2021-09-23 ASSESSMENT — PAIN SCALES - GENERAL: PAIN_LEVEL: 0

## 2021-09-23 ASSESSMENT — FIBROSIS 4 INDEX: FIB4 SCORE: 2.38

## 2021-09-23 NOTE — ANESTHESIA TIME REPORT
Anesthesia Start and Stop Event Times     Date Time Event    9/23/2021 0647 Ready for Procedure     0700 Anesthesia Start     0719 Anesthesia Stop        Responsible Staff  09/23/21    Name Role Begin End    Anel Alexis M.D. Anesth 0700 0719        Preop Diagnosis (Free Text):  Pre-op Diagnosis     Mass of finger of left hand         Preop Diagnosis (Codes):    Post op Diagnosis  Finger mass, left      Premium Reason  Non-Premium    Comments:

## 2021-09-23 NOTE — ANESTHESIA POSTPROCEDURE EVALUATION
Patient: Lela Orzoco    Procedure Summary     Date: 09/23/21 Room / Location: [facility] ROOM 21 / SURGERY SAME DAY [facility]    Anesthesia Start: 0700 Anesthesia Stop: 0719    Procedure: EXCISION,MASS,FINGER - INDEX (Left Hand) Diagnosis:       Mass of finger of left hand      (Mass of finger of left hand )    Surgeons: Karly Briseno M.D. Responsible Provider: Anel Alexis M.D.    Anesthesia Type: MAC ASA Status: 2          Final Anesthesia Type: MAC  Last vitals  BP   Blood Pressure : (!) 161/77    Temp   36.4 °C (97.5 °F)    Pulse   81   Resp   18    SpO2          Anesthesia Post Evaluation    Patient location during evaluation: PACU  Patient participation: complete - patient participated  Level of consciousness: awake and alert  Pain score: 0    Airway patency: patent  Anesthetic complications: no  Cardiovascular status: hemodynamically stable  Respiratory status: acceptable  Hydration status: euvolemic    PONV: none          No complications documented.

## 2021-09-23 NOTE — OR NURSING
0717: patient arrived from OR, report received, attached to monitoring. VSS, patient oxygenating well on 3 L oxymask, dressing to left index finger- zeroform, gauze, coban= clean/dry/intact, patient awake, denies pain and nausea at this time      0727: patient given sip of water tolerated well    0735: phase I recovery complete    0740: discharge instructions discussed with sonCullen    0752: patient changed into clothes    0800: patient d/c home in stable condition, vss, denies pain and nausea, all belongings with patient and accounted for, escorted out via wheelchair with BAIRON Landeros

## 2021-09-23 NOTE — OP REPORT
OPERATIVE NOTE     DATE OF PROCEDURE: 9/23/2021            PRE-OP DIAGNOSIS: Left index finger mass            POST-OP DIAGNOSIS: same            PROCEDURE: Left index finger mass excision            SURGEON: Karly Briseno M.D. - Primary            ANESTHESIA: MAC            ESTIMATED BLOOD LOSS: minimal                   SPECIMENS: one sent to pathology            COMPLICATIONS: none            CONDITION: stable to PACU    DESCRIPTION OF PROCEDURE: This is a pleasant 89 y.o. female who presented to my office with a left index finger mass.  Radiographically she had visible calcifications in the area of the mass.  It was consistent with a benign area of calcifications.  We discussed the diagnosis as well as treatment options.  We discussed conservative and operative treatment options.  They failed conservative treatment options.  We surgical mass excision. We discussed risks including, but not limited to, bleeding, infection, wound healing issues, hematoma, seroma, final diagnosis dependent on pathology, numbness in the finger, recurrence, stiffness, tendon damage, nail growth issues, extensor lag, wound, risk of anesthesia.  They elected to proceed.  I saw the patient in the preoperative holding area, identified them, and marked the finger.  They were taken back to the operating room.  Light sedation was induced by the anesthesia provider.  A timeout was performed. I performed a digital block using 10 cc of (50%) 1% lidocaine and (50%) 0.5% bupivacaine.  A tourniquet was placed on the forearm and the upper extremity was prepped and draped in usual orthopedic fashion.  An additional timeout was performed.  A finger tourniquet was applied.  I made an oblique incision over the volar aspect of the middle phalanx overlying the mass.  I bluntly elevated skin flaps and began dissecting around the mass.  It did go deep.  It appeared well encapsulated and was consistent with calcifications.  I continued to dissect around  the mass.  It was abutting the flexor retinaculum but did not violate it.  On the radial side, I identified the digital neurovascular bundle and protected it.  I excised the mass in 1 piece and sent to pathology.  I examined the wound and found no remnants.  The wound was thoroughly irrigated with normal saline.  I closed the skin with 4-0 nylon suture.  A sterile dressing was applied.  The patient woke from anesthesia and was transferred to the PACU in stable condition.

## 2021-09-23 NOTE — LETTER
[address] [place] [address]  [phone]    Patient Name: Lela MG Orozco  Surgeon Name: Karly Briseno M.D.  Surgery Facility: [facility] ([address] [place] [address]) ([facility])   Surgery Date: 9/23/2021    The time of your surgery is not final and may change up to and until the day of your surgery. You will be contacted 24-48 hours prior to your surgery date with your check-in and surgery time.    If you will not be at one of the below numbers please call/text the surgery scheduler at [phone]  Cell Phone: [phone]    BEFORE YOUR SURGERY  Pre Registration and/or Lab Work must be done within and no earlier than 28 days prior to your surgery date. Please call [facility] @ [phone] for an appointment as soon as possible.    Please refrain from smoking any substance after midnight prior to surgery. Smoking may interfere with the anesthetic and frequently produces nausea during the recovery period.    Continue taking all lifesaving medications. Including the morning of your surgery with small sip of water.    Please read the MEDICATION INSTRUCTIONS below completely.    DAY OF YOUR SURGERY  Nothing to eat or drink after midnight   Please arrive at the hospital/surgery center at the check-in time provided.   An adult will need to bring you and take you home after your surgery.   AFTER YOUR SURGERY  Post op Appointment:   Date: 10/04/2021   Time: 9:45AM   With: DR BRISENO    Location: [facility] [phone]  TIME OFF WORK  FMLA or Disability forms can be faxed directly to: [phone] or you may drop them off at [phone]  [phone]. Our office charges a $35.00 fee per form. Forms will be completed within 10 business days of drop off and payment received. For the status of your forms you may contact our disability office directly [phone].    MEDICATION INSTRUCTIONS  The following medications should be stopped a minimum of 10  days prior to surgery:  All over the counter, Supplements & Herbal medications    Anorectics: Phentermine (Adipex-P, Lomaira and Suprenza), Phentermine-topiramate (Qsymia), Bupropion-naltrexone (Contrave)    Opiod Partial Agonists/Opioid Antagonists: Buprenorphine (Subocone, Belbuca, Butrans, Probuphine Implant, Sublocade), Naltrexone (ReVia, Vivitrol), Naloxone    Amphetamines: Dextroamphetamine/Amphetamine (Adderall, Mydayis), Methylphenidate Hydrochloride (Concerta, Metadate, Methylin, Ritalin)    The following medications should be stopped 5 days prior to surgery:  Blood Thinners: Any Aspirin, Aspirin products, anti-inflammatories such as ibuprofen and any blood thinners such as Coumadin and Plavix. Please consult your prescribing physician if you are on life saving blood thinners, in regards to when to stop medications prior to surgery.     The following medications should be stopped a minimum of 3 days prior to surgery:  PDE-5 inhibitors: Sildenafil (Viagra), Tadalafil (Cialis), Vardenafil (Levitra), Avanafil (Stendra)    MAO Inhibitors: Rasagiline (Azilect), Selegiline (Eldepryl, Emsam, Selapar), Isocarboxazid (Marplan), Phenelzine (Nardil)

## 2021-09-23 NOTE — DISCHARGE INSTR - OTHER INFO
DR. BRISENO'S POST-OPERATIVE INSTRUCTIONS    You have just undergone a sugery by Dr. Briseno in the operating room.  It is our wish that your postoperative recovery be as quick and comfortable as possible.  Please carefully review the following items that are important for your recovery.    After any operation, a certain degree of pain is to be expected. Take Advil (ibuprofen) and Extra Strength Tylenol as first line medications for mild to moderate pain. Taking each one every 6 hours, and staggering them so that you are taking one medicine every 3 hours, is the most effective. Refer to dosing instructions on the bottle, but in general ibuprofen dose is 600-800mg and Tylenol dose is 500-1000mg. For most small procedures, this should be enough to keep you comfortable. Take these medications until your followup visit. You may have been given a small prescription for stronger pain medicine which will help relieve more severe pain.  Pain medicine may make you drowsy so please keep this in mind.  Do not drive while taking pain medicine.      When you go home, please keep your operated arm elevated at all times (above the level of your heart).  If you do this, your swelling will resolve more quickly and your pain will be improve more quickly as well. You may also place an ice pack over your dressing or splint to help with swelling and pain.    It is also very important to keep your fingers moving after most procedures, unless you had a tendon repair or fracture repair in which case you will be in a splint. Keep all of your fingers moving through a full range of motion to prevent stiffness.    For small hand procedures such as carpal tunnel release, trigger finger release, and cyst excision, the dressing that you have on your extremity should remain on for 3-4 days. It may then be removed, you can wash the incision  gently with soap and water, and keep the incision covered with a band-aid or similar clean dressing. For larger procedures or if you have a splint on, these should remain on until follow up or as specifically instructed. If you feel that your dressing is too tight during the first 3 days after surgery, you may loosen it. It is normal to see minor staining on the dressing after surgery. If there is significant bleeding, you are advised to call the office during regular office hours to have this checked.  Make sure that your dressing is kept dry at all times.  You can take a shower if you cover your arm with a plastic bag. If your dressing gets wet, replace it with sterile dressing that you can obtain from your local drug store.    Please call our office for a follow-up appointment, [phone]. Follow up after surgery is typically 10-14 days, unless you were specifically instructed otherwise. The sutures will be removed and you may be asked to see a hand therapist to optimize your functional result. Each of the hand therapists that you will be referred to have received special training in the care of the hand and upper extremity.    If you have questions regarding your surgery postop that you feel requires attention, please call the office at [phone] during business hours, or [phone] after hours for the answering service. If you feel that you have a surgical emergency postoperatively that requires immediate attention, please call the above numbers or go to the Emergency Department and ask for the Orthopedic Surgeon on call.

## 2021-09-23 NOTE — ANESTHESIA PREPROCEDURE EVALUATION
89yo female with finger mass    Relevant Problems      (positive) CKD (chronic kidney disease) stage 3, GFR 30-59 ml/min (HCC)      Other   (positive) Cervical spine arthritis       Physical Exam    Airway   Mallampati: I  TM distance: >3 FB  Neck ROM: full       Cardiovascular - normal exam  Rhythm: regular  Rate: normal  (-) murmur     Dental - normal exam  (+) upper dentures, lower dentures           Pulmonary - normal exam  Breath sounds clear to auscultation     Abdominal    Neurological - normal exam                 Anesthesia Plan    ASA 2       Plan - MAC               Induction: intravenous    Postoperative Plan: Postoperative administration of opioids is intended.    Pertinent diagnostic labs and testing reviewed    Informed Consent:    Anesthetic plan and risks discussed with patient.    Use of blood products discussed with: patient whom consented to blood products.

## 2021-10-07 DIAGNOSIS — F03.90 DEMENTIA WITHOUT BEHAVIORAL DISTURBANCE, UNSPECIFIED DEMENTIA TYPE: ICD-10-CM

## 2021-10-07 DIAGNOSIS — R41.3 MEMORY LOSS: ICD-10-CM

## 2021-10-07 RX ORDER — MEMANTINE HYDROCHLORIDE 10 MG/1
10 TABLET ORAL 2 TIMES DAILY
COMMUNITY
End: 2021-10-07 | Stop reason: SDUPTHER

## 2021-10-07 RX ORDER — DONEPEZIL HYDROCHLORIDE 10 MG/1
10 TABLET, FILM COATED ORAL EVERY EVENING
Qty: 90 TABLET | Refills: 3 | Status: CANCELLED | OUTPATIENT
Start: 2021-10-07

## 2021-10-07 RX ORDER — MEMANTINE HYDROCHLORIDE 10 MG/1
10 TABLET ORAL 2 TIMES DAILY
Qty: 60 TABLET | Refills: 5 | Status: SHIPPED | OUTPATIENT
Start: 2021-10-07 | End: 2022-03-28

## 2021-10-28 NOTE — DOCUMENTATION QUERY
Critical access hospital                                                                       Query Response Note      PATIENT:               CHIP MCCORD  ACCT #:                  [id number]  MRN:                     [id number]  :                      1932  ADMIT DATE:       2021 5:04 AM  DISCH DATE:        2021 8:00 AM  RESPONDING  PROVIDER #:        [id number]           QUERY TEXT:    Left Finger mass Excision has been documented in the medical record.  Please document the deepest level of excision treated.      NOTE:  If an appropriate response is not listed below, please respond with a new note.        The patient's Clinical Indicators include:  Clinical indicators  Left Index Finger mass excision with visible calcification noted on the Op report    Treatment or Monitoring  Mass excision with abutment to the flexor retinaculum    Risk Factors    Coders contact information  [email or web address]  Options provided:   -- Deepest level of excision treated - skin   -- Deepest level of excision treated - subcutaneous tissue   -- Deepest level of excision treated - subfascial or intramuscular   -- Deepest level of excision treated - tendon sheath or joint capsule   -- Unable to determine      Query created by: Madai Do on 10/25/2021 6:24 AM    RESPONSE TEXT:    Deepest level of excision treated - tendon sheath or joint capsule          Electronically signed by:  JEAN CARLOS GRIJALVA MD 10/28/2021 9:32 AM

## 2021-12-06 ENCOUNTER — APPOINTMENT (RX ONLY)
Dept: URBAN - METROPOLITAN AREA CLINIC 4 | Facility: CLINIC | Age: 86
Setting detail: DERMATOLOGY
End: 2021-12-06

## 2021-12-06 DIAGNOSIS — Z85.828 PERSONAL HISTORY OF OTHER MALIGNANT NEOPLASM OF SKIN: ICD-10-CM

## 2021-12-06 DIAGNOSIS — D22 MELANOCYTIC NEVI: ICD-10-CM

## 2021-12-06 DIAGNOSIS — L82.1 OTHER SEBORRHEIC KERATOSIS: ICD-10-CM

## 2021-12-06 DIAGNOSIS — L81.4 OTHER MELANIN HYPERPIGMENTATION: ICD-10-CM

## 2021-12-06 DIAGNOSIS — L57.0 ACTINIC KERATOSIS: ICD-10-CM

## 2021-12-06 DIAGNOSIS — D18.0 HEMANGIOMA: ICD-10-CM

## 2021-12-06 DIAGNOSIS — L72.8 OTHER FOLLICULAR CYSTS OF THE SKIN AND SUBCUTANEOUS TISSUE: ICD-10-CM

## 2021-12-06 DIAGNOSIS — Z71.89 OTHER SPECIFIED COUNSELING: ICD-10-CM

## 2021-12-06 DIAGNOSIS — Z86.007 PERSONAL HISTORY OF IN-SITU NEOPLASM OF SKIN: ICD-10-CM

## 2021-12-06 PROBLEM — D22.61 MELANOCYTIC NEVI OF RIGHT UPPER LIMB, INCLUDING SHOULDER: Status: ACTIVE | Noted: 2021-12-06

## 2021-12-06 PROBLEM — D04.39 CARCINOMA IN SITU OF SKIN OF OTHER PARTS OF FACE: Status: ACTIVE | Noted: 2021-12-06

## 2021-12-06 PROBLEM — D22.62 MELANOCYTIC NEVI OF LEFT UPPER LIMB, INCLUDING SHOULDER: Status: ACTIVE | Noted: 2021-12-06

## 2021-12-06 PROBLEM — D22.39 MELANOCYTIC NEVI OF OTHER PARTS OF FACE: Status: ACTIVE | Noted: 2021-12-06

## 2021-12-06 PROBLEM — D22.5 MELANOCYTIC NEVI OF TRUNK: Status: ACTIVE | Noted: 2021-12-06

## 2021-12-06 PROBLEM — D18.01 HEMANGIOMA OF SKIN AND SUBCUTANEOUS TISSUE: Status: ACTIVE | Noted: 2021-12-06

## 2021-12-06 PROBLEM — D22.72 MELANOCYTIC NEVI OF LEFT LOWER LIMB, INCLUDING HIP: Status: ACTIVE | Noted: 2021-12-06

## 2021-12-06 PROBLEM — D22.71 MELANOCYTIC NEVI OF RIGHT LOWER LIMB, INCLUDING HIP: Status: ACTIVE | Noted: 2021-12-06

## 2021-12-06 PROCEDURE — ? ADDITIONAL NOTES

## 2021-12-06 PROCEDURE — ? COUNSELING

## 2021-12-06 PROCEDURE — ? DIAGNOSIS COMMENT

## 2021-12-06 PROCEDURE — 99213 OFFICE O/P EST LOW 20 MIN: CPT

## 2021-12-06 PROCEDURE — ? DEFER

## 2021-12-06 ASSESSMENT — LOCATION DETAILED DESCRIPTION DERM
LOCATION DETAILED: RIGHT RADIAL DORSAL HAND
LOCATION DETAILED: RIGHT ANTERIOR PROXIMAL THIGH
LOCATION DETAILED: RIGHT ANTERIOR DISTAL UPPER ARM
LOCATION DETAILED: RIGHT CENTRAL MALAR CHEEK
LOCATION DETAILED: MIDDLE STERNUM
LOCATION DETAILED: LEFT MEDIAL UPPER BACK
LOCATION DETAILED: LEFT INFERIOR MEDIAL MALAR CHEEK
LOCATION DETAILED: LEFT INFERIOR MEDIAL FOREHEAD
LOCATION DETAILED: SUPERIOR THORACIC SPINE
LOCATION DETAILED: LEFT PROXIMAL CALF
LOCATION DETAILED: LEFT DISTAL PALMAR INDEX FINGER
LOCATION DETAILED: LEFT ANTERIOR DISTAL UPPER ARM
LOCATION DETAILED: LEFT ANTERIOR LATERAL DISTAL UPPER ARM
LOCATION DETAILED: EPIGASTRIC SKIN
LOCATION DETAILED: LEFT CENTRAL MALAR CHEEK
LOCATION DETAILED: LEFT SUPERIOR MEDIAL UPPER BACK
LOCATION DETAILED: LEFT ANTERIOR PROXIMAL THIGH
LOCATION DETAILED: RIGHT INFERIOR CENTRAL MALAR CHEEK
LOCATION DETAILED: LOWER STERNUM
LOCATION DETAILED: INFERIOR THORACIC SPINE

## 2021-12-06 ASSESSMENT — LOCATION SIMPLE DESCRIPTION DERM
LOCATION SIMPLE: LEFT CALF
LOCATION SIMPLE: LEFT FOREHEAD
LOCATION SIMPLE: UPPER BACK
LOCATION SIMPLE: LEFT THIGH
LOCATION SIMPLE: LEFT CHEEK
LOCATION SIMPLE: LEFT UPPER BACK
LOCATION SIMPLE: ABDOMEN
LOCATION SIMPLE: RIGHT CHEEK
LOCATION SIMPLE: RIGHT HAND
LOCATION SIMPLE: LEFT INDEX FINGER
LOCATION SIMPLE: LEFT UPPER ARM
LOCATION SIMPLE: CHEST
LOCATION SIMPLE: RIGHT UPPER ARM
LOCATION SIMPLE: RIGHT THIGH

## 2021-12-06 ASSESSMENT — LOCATION ZONE DERM
LOCATION ZONE: FINGER
LOCATION ZONE: LEG
LOCATION ZONE: ARM
LOCATION ZONE: HAND
LOCATION ZONE: TRUNK
LOCATION ZONE: FACE

## 2021-12-06 NOTE — PROCEDURE: DIAGNOSIS COMMENT
Render Risk Assessment In Note?: no
Detail Level: Detailed
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 Treated by C&D at time of biopsy.
Comment: Previously biopsied to confirm in situ at least. Previously treated with Efudex, however residual lesion still present. \\n\\nDiscussed treatment options including Mohs vs excision. \\nRisks vs benefits were discussed \\nPatient is not interested in surgery or any procedures at this time. Son present for discussion. \\nPatient elects to try effudex cream BID 5 days per week for another 4 weeks. Hold for excessive irritation. She and son understand the risk of incomplete treatment.

## 2021-12-06 NOTE — PROCEDURE: DEFER
Introduction Text (Please End With A Colon): The following procedure was deferred: biopsy by shave method
Detail Level: Zone

## 2021-12-06 NOTE — PROCEDURE: ADDITIONAL NOTES
Additional Notes: Recommended to see hand surgeon for removal\\n1.7 cm
Render Risk Assessment In Note?: no
Detail Level: Simple
Additional Notes: Discussed Ln2 today, patient declines treatment at this time.
Additional Notes: several well healed sites without evidence of recurrence.

## 2021-12-13 ENCOUNTER — OFFICE VISIT (OUTPATIENT)
Dept: NEUROLOGY | Facility: MEDICAL CENTER | Age: 86
End: 2021-12-13
Attending: STUDENT IN AN ORGANIZED HEALTH CARE EDUCATION/TRAINING PROGRAM
Payer: MEDICARE

## 2021-12-13 VITALS
BODY MASS INDEX: 19.03 KG/M2 | TEMPERATURE: 98.2 F | OXYGEN SATURATION: 92 % | RESPIRATION RATE: 12 BRPM | WEIGHT: 103.4 LBS | HEART RATE: 80 BPM | HEIGHT: 62 IN | DIASTOLIC BLOOD PRESSURE: 80 MMHG | SYSTOLIC BLOOD PRESSURE: 132 MMHG

## 2021-12-13 DIAGNOSIS — R41.89 COGNITIVE IMPAIRMENT: ICD-10-CM

## 2021-12-13 DIAGNOSIS — R25.2 LEG CRAMPS: ICD-10-CM

## 2021-12-13 DIAGNOSIS — F03.90 DEMENTIA WITHOUT BEHAVIORAL DISTURBANCE, UNSPECIFIED DEMENTIA TYPE: ICD-10-CM

## 2021-12-13 DIAGNOSIS — R26.89 IMBALANCE: ICD-10-CM

## 2021-12-13 DIAGNOSIS — R41.3 MEMORY LOSS: ICD-10-CM

## 2021-12-13 PROCEDURE — 99215 OFFICE O/P EST HI 40 MIN: CPT | Performed by: STUDENT IN AN ORGANIZED HEALTH CARE EDUCATION/TRAINING PROGRAM

## 2021-12-13 PROCEDURE — 99212 OFFICE O/P EST SF 10 MIN: CPT | Performed by: STUDENT IN AN ORGANIZED HEALTH CARE EDUCATION/TRAINING PROGRAM

## 2021-12-13 RX ORDER — FLUOROURACIL 50 MG/G
CREAM TOPICAL
COMMUNITY
Start: 2021-12-10 | End: 2022-01-01

## 2021-12-13 ASSESSMENT — FIBROSIS 4 INDEX: FIB4 SCORE: 2.38

## 2021-12-13 NOTE — PROGRESS NOTES
"NEUROLOGY CLINIC FOLLOW-UP - 12/13/2021   REFERRING PROVIDER  No referring provider defined for this encounter.    REASON FOR VISIT: Lela June Robinson 89 y.o. female presents today for follow-up for MCI vs early dementia        INTERVAL HISTORY:  Having vivid dreams rarely. Dreams aren't bothersome.    Still doing puzzles.Still walking regularly.      Sleep is good overall.     Does have some friends.     Mood is good.     Balance is good.     On memantine 10 mg BID, and aricept 10 mg QHS.    Patient's PMH, PSH, FH, and SH were reviewed.    ROS: All review of systems complete and are negative except as documented  CURRENT MEDICATIONS AT THE TIME OF THIS ENCOUNTER:    Current Outpatient Medications:   •  fluorouracil, , Taking  •  memantine, 10 mg, Oral, BID, Taking  •  Acetaminophen (TYLENOL ARTHRITIS PAIN PO), 1 Tablet, Oral, PRN, PRN  •  donepezil, 10 mg, Oral, Q EVENING, Taking  •  Hydrocortisone Acetate, Apply to affected area twice daily, PRN  •  mirtazapine, 15 mg, Oral, QHS, Taking     EXAM:   Ambulatory Vitals:  /80 (BP Location: Left arm, Patient Position: Sitting, BP Cuff Size: Adult)   Pulse 80   Temp 36.8 °C (98.2 °F) (Temporal)   Resp 12   Ht 1.575 m (5' 2\")   Wt 46.9 kg (103 lb 6.3 oz)   SpO2 92%    Physical Exam:  Physical Exam  Vitals reviewed.   Constitutional:       General: She is awake.      Appearance: Normal appearance.   HENT:      Nose: Nose normal.      Mouth/Throat:      Mouth: Mucous membranes are dry.      Pharynx: Oropharynx is clear. No oropharyngeal exudate or posterior oropharyngeal erythema.   Eyes:      General:         Right eye: No discharge.         Left eye: No discharge.   Cardiovascular:      Rate and Rhythm: Normal rate and regular rhythm.      Pulses: Normal pulses.      Heart sounds: Normal heart sounds.   Pulmonary:      Effort: Pulmonary effort is normal. No respiratory distress.      Breath sounds: Normal breath sounds. No stridor. No wheezing or rhonchi. "   Musculoskeletal:         General: No swelling, tenderness or deformity.      Cervical back: Normal range of motion and neck supple.   Skin:     Coloration: Skin is not jaundiced.      Findings: Lesion present. No bruising, erythema or rash.      Comments: Skin cancer, right temporal   Neurological:      Mental Status: She is alert.      Deep Tendon Reflexes: Strength normal.        Neurological Exam   Neurological Exam  Mental Status  Awake and alert.  Alert to year but not year or president. Though it was 1940s. 0/3 in recall, but will clue she was 3/3. Some difficult in saying months, spelling WORLD.    Motor   Strength is 5/5 throughout all four extremities.    Sensory  Sensation is intact to light touch, pinprick, vibration and proprioception in all four extremities.    Reflexes                                           Right                      Left  Brachioradialis                    Tr                         Tr  Biceps                                 Tr                         Tr  Triceps                                Tr                         Tr  Patellar                                Tr                         Tr  Achilles                               Tr                            Coordination  Right: Finger-to-nose normal.  Left: Finger-to-nose normal.    Gait  Casual gait is normal including stance, stride, and arm swing.       ALL DATA (I.e. labs, procedures, imaging, reports, clinical notes, etc.) FROM [facility] AND/OR OUTSIDE SOURCES, IF AVAILABLE, PERSONALLY REVIEWED:   LABS:    MAGING-    PROCEDURE    OUTSIDE DATA    ASSESSMENT, EDUCATION, AND COUNSELING:  This is a 89 y.o. female patient who presents to the neurology clinic. We had an extensive discussion about the patient's symptoms, signs, and work-up to date, if any. We discussed potential and/or definitive diagnoses, work-up, and potential treatments.       PLAN:  If applicable, the work-up such as labs, imaging, procedures, and/or other  testing, referrals, and/or recommended treatment strategies are listed below.  Visit Diagnoses     ICD-10-CM   1. Dementia without behavioral disturbance, unspecified dementia type (HCC)  F03.90   2. Cognitive impairment  R41.89   3. Imbalance  R26.89   4. Memory loss  R41.3   5. Leg cramps  R25.2      Orders Placed This Encounter   • fluorouracil (EFUDEX) 5 % cream      Patient with dementia, AD. On aricept and nemenda and tolerating well. No changes needed. F/u in 6 months    Follow-up:   • 6 months        BILLING DOCUMENTATION:     I spent a total of 40 minutes of face-to-face time in this visit. Over 50% of the time of the visit today was spent on counseling and/or coordination of care wtih the patient and/or family, as above in assessment in plan.    Rohit Ceballos MD  Epilepsy and General Neurology  Department of Neurology  Clinical  of Neurology [facility].

## 2022-01-01 ENCOUNTER — APPOINTMENT (OUTPATIENT)
Dept: CARDIOLOGY | Facility: MEDICAL CENTER | Age: 87
End: 2022-01-01
Attending: HOSPITALIST
Payer: MEDICARE

## 2022-01-01 ENCOUNTER — APPOINTMENT (OUTPATIENT)
Dept: RADIOLOGY | Facility: MEDICAL CENTER | Age: 87
End: 2022-01-01
Attending: HOSPITALIST
Payer: MEDICARE

## 2022-01-01 ENCOUNTER — OFFICE VISIT (OUTPATIENT)
Dept: MEDICAL GROUP | Facility: PHYSICIAN GROUP | Age: 87
End: 2022-01-01
Payer: MEDICARE

## 2022-01-01 ENCOUNTER — APPOINTMENT (OUTPATIENT)
Dept: NEUROLOGY | Facility: MEDICAL CENTER | Age: 87
End: 2022-01-01
Attending: PSYCHIATRY & NEUROLOGY
Payer: MEDICARE

## 2022-01-01 ENCOUNTER — HOSPITAL ENCOUNTER (OUTPATIENT)
Facility: MEDICAL CENTER | Age: 87
End: 2022-11-18
Attending: EMERGENCY MEDICINE | Admitting: HOSPITALIST
Payer: MEDICARE

## 2022-01-01 ENCOUNTER — PATIENT MESSAGE (OUTPATIENT)
Dept: HEALTH INFORMATION MANAGEMENT | Facility: OTHER | Age: 87
End: 2022-01-01

## 2022-01-01 ENCOUNTER — PATIENT OUTREACH (OUTPATIENT)
Dept: HEALTH INFORMATION MANAGEMENT | Facility: OTHER | Age: 87
End: 2022-01-01
Payer: MEDICARE

## 2022-01-01 ENCOUNTER — OFFICE VISIT (OUTPATIENT)
Dept: NEUROLOGY | Facility: MEDICAL CENTER | Age: 87
End: 2022-01-01
Attending: STUDENT IN AN ORGANIZED HEALTH CARE EDUCATION/TRAINING PROGRAM
Payer: MEDICARE

## 2022-01-01 VITALS
WEIGHT: 91.05 LBS | TEMPERATURE: 97.6 F | DIASTOLIC BLOOD PRESSURE: 76 MMHG | OXYGEN SATURATION: 93 % | RESPIRATION RATE: 16 BRPM | HEART RATE: 98 BPM | BODY MASS INDEX: 17.88 KG/M2 | HEIGHT: 60 IN | SYSTOLIC BLOOD PRESSURE: 144 MMHG

## 2022-01-01 VITALS
HEIGHT: 62 IN | DIASTOLIC BLOOD PRESSURE: 76 MMHG | OXYGEN SATURATION: 93 % | WEIGHT: 104.94 LBS | HEART RATE: 82 BPM | SYSTOLIC BLOOD PRESSURE: 128 MMHG | BODY MASS INDEX: 19.31 KG/M2

## 2022-01-01 VITALS
HEIGHT: 60 IN | RESPIRATION RATE: 12 BRPM | SYSTOLIC BLOOD PRESSURE: 104 MMHG | HEART RATE: 80 BPM | OXYGEN SATURATION: 95 % | WEIGHT: 88.8 LBS | TEMPERATURE: 97.7 F | DIASTOLIC BLOOD PRESSURE: 72 MMHG | BODY MASS INDEX: 17.43 KG/M2

## 2022-01-01 DIAGNOSIS — R63.4 WEIGHT LOSS: ICD-10-CM

## 2022-01-01 DIAGNOSIS — R26.89 IMBALANCE: ICD-10-CM

## 2022-01-01 DIAGNOSIS — R55 SYNCOPE, UNSPECIFIED SYNCOPE TYPE: ICD-10-CM
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
## 2022-01-01 DIAGNOSIS — R25.2 LEG CRAMPS: ICD-10-CM

## 2022-01-01 DIAGNOSIS — M15.9 OSTEOARTHRITIS OF MULTIPLE JOINTS, UNSPECIFIED OSTEOARTHRITIS TYPE: ICD-10-CM

## 2022-01-01 DIAGNOSIS — R63.0 POOR APPETITE: ICD-10-CM

## 2022-01-01 DIAGNOSIS — M47.812 CERVICAL SPINE ARTHRITIS: ICD-10-CM

## 2022-01-01 DIAGNOSIS — M85.80 OSTEOPENIA, UNSPECIFIED LOCATION: ICD-10-CM

## 2022-01-01 DIAGNOSIS — R55 SYNCOPE AND COLLAPSE: ICD-10-CM

## 2022-01-01 DIAGNOSIS — R41.81 AGE-RELATED COGNITIVE DECLINE: ICD-10-CM

## 2022-01-01 DIAGNOSIS — F03.90 DEMENTIA WITHOUT BEHAVIORAL DISTURBANCE, UNSPECIFIED DEMENTIA TYPE: ICD-10-CM

## 2022-01-01 DIAGNOSIS — E78.5 DYSLIPIDEMIA (HIGH LDL; LOW HDL): ICD-10-CM

## 2022-01-01 DIAGNOSIS — F03.90 DEMENTIA (HCC): ICD-10-CM

## 2022-01-01 DIAGNOSIS — R41.3 MEMORY LOSS: ICD-10-CM

## 2022-01-01 LAB
ANION GAP SERPL CALC-SCNC: 11 MMOL/L (ref 7–16)
APPEARANCE UR: CLEAR
BACTERIA #/AREA URNS HPF: NEGATIVE /HPF
BASOPHILS # BLD AUTO: 0.6 % (ref 0–1.8)
BASOPHILS # BLD: 0.03 K/UL (ref 0–0.12)
BILIRUB UR QL STRIP.AUTO: NEGATIVE
BUN SERPL-MCNC: 22 MG/DL (ref 8–22)
CALCIUM SERPL-MCNC: 9.2 MG/DL (ref 8.5–10.5)
CHLORIDE SERPL-SCNC: 100 MMOL/L (ref 96–112)
CO2 SERPL-SCNC: 24 MMOL/L (ref 20–33)
COLOR UR: YELLOW
CREAT SERPL-MCNC: 1.36 MG/DL (ref 0.5–1.4)
EKG IMPRESSION: NORMAL
EOSINOPHIL # BLD AUTO: 0.04 K/UL (ref 0–0.51)
EOSINOPHIL NFR BLD: 0.8 % (ref 0–6.9)
EPI CELLS #/AREA URNS HPF: NEGATIVE /HPF
ERYTHROCYTE [DISTWIDTH] IN BLOOD BY AUTOMATED COUNT: 41.1 FL (ref 35.9–50)
GFR SERPLBLD CREATININE-BSD FMLA CKD-EPI: 37 ML/MIN/1.73 M 2
GLUCOSE SERPL-MCNC: 160 MG/DL (ref 65–99)
GLUCOSE UR STRIP.AUTO-MCNC: NEGATIVE MG/DL
HCT VFR BLD AUTO: 41.7 % (ref 37–47)
HGB BLD-MCNC: 13.6 G/DL (ref 12–16)
HYALINE CASTS #/AREA URNS LPF: NORMAL /LPF
IMM GRANULOCYTES # BLD AUTO: 0.02 K/UL (ref 0–0.11)
IMM GRANULOCYTES NFR BLD AUTO: 0.4 % (ref 0–0.9)
KETONES UR STRIP.AUTO-MCNC: NEGATIVE MG/DL
LEUKOCYTE ESTERASE UR QL STRIP.AUTO: NEGATIVE
LV EJECT FRACT  99904: 70
LV EJECT FRACT  99904: 70
LYMPHOCYTES # BLD AUTO: 0.38 K/UL (ref 1–4.8)
LYMPHOCYTES NFR BLD: 7.6 % (ref 22–41)
MCH RBC QN AUTO: 28.6 PG (ref 27–33)
MCHC RBC AUTO-ENTMCNC: 32.6 G/DL (ref 33.6–35)
MCV RBC AUTO: 87.6 FL (ref 81.4–97.8)
MICRO URNS: ABNORMAL
MONOCYTES # BLD AUTO: 0.26 K/UL (ref 0–0.85)
MONOCYTES NFR BLD AUTO: 5.2 % (ref 0–13.4)
NEUTROPHILS # BLD AUTO: 4.27 K/UL (ref 2–7.15)
NEUTROPHILS NFR BLD: 85.4 % (ref 44–72)
NITRITE UR QL STRIP.AUTO: NEGATIVE
NRBC # BLD AUTO: 0 K/UL
NRBC BLD-RTO: 0 /100 WBC
PH UR STRIP.AUTO: 6 [PH] (ref 5–8)
PLATELET # BLD AUTO: 187 K/UL (ref 164–446)
PMV BLD AUTO: 9.1 FL (ref 9–12.9)
POTASSIUM SERPL-SCNC: 3.9 MMOL/L (ref 3.6–5.5)
PROT UR QL STRIP: 30 MG/DL
RBC # BLD AUTO: 4.76 M/UL (ref 4.2–5.4)
RBC # URNS HPF: NORMAL /HPF
RBC UR QL AUTO: NEGATIVE
SODIUM SERPL-SCNC: 135 MMOL/L (ref 135–145)
SP GR UR STRIP.AUTO: 1.01
TROPONIN T SERPL-MCNC: 14 NG/L (ref 6–19)
UROBILINOGEN UR STRIP.AUTO-MCNC: 0.2 MG/DL
WBC # BLD AUTO: 5 K/UL (ref 4.8–10.8)
WBC #/AREA URNS HPF: NORMAL /HPF

## 2022-01-01 PROCEDURE — 700111 HCHG RX REV CODE 636 W/ 250 OVERRIDE (IP): Performed by: HOSPITALIST

## 2022-01-01 PROCEDURE — 81001 URINALYSIS AUTO W/SCOPE: CPT

## 2022-01-01 PROCEDURE — 93308 TTE F-UP OR LMTD: CPT | Mod: 26 | Performed by: INTERNAL MEDICINE

## 2022-01-01 PROCEDURE — 71045 X-RAY EXAM CHEST 1 VIEW: CPT

## 2022-01-01 PROCEDURE — 99217 PR OBSERVATION CARE DISCHARGE: CPT | Performed by: HOSPITALIST

## 2022-01-01 PROCEDURE — 96374 THER/PROPH/DIAG INJ IV PUSH: CPT | Mod: XU

## 2022-01-01 PROCEDURE — 95816 EEG AWAKE AND DROWSY: CPT | Mod: 26 | Performed by: STUDENT IN AN ORGANIZED HEALTH CARE EDUCATION/TRAINING PROGRAM

## 2022-01-01 PROCEDURE — 700102 HCHG RX REV CODE 250 W/ 637 OVERRIDE(OP): Performed by: HOSPITALIST

## 2022-01-01 PROCEDURE — 99213 OFFICE O/P EST LOW 20 MIN: CPT | Performed by: STUDENT IN AN ORGANIZED HEALTH CARE EDUCATION/TRAINING PROGRAM

## 2022-01-01 PROCEDURE — 85025 COMPLETE CBC W/AUTO DIFF WBC: CPT

## 2022-01-01 PROCEDURE — 84484 ASSAY OF TROPONIN QUANT: CPT

## 2022-01-01 PROCEDURE — G0378 HOSPITAL OBSERVATION PER HR: HCPCS

## 2022-01-01 PROCEDURE — A9270 NON-COVERED ITEM OR SERVICE: HCPCS | Performed by: HOSPITALIST

## 2022-01-01 PROCEDURE — 99285 EMERGENCY DEPT VISIT HI MDM: CPT

## 2022-01-01 PROCEDURE — 93005 ELECTROCARDIOGRAM TRACING: CPT | Performed by: EMERGENCY MEDICINE

## 2022-01-01 PROCEDURE — 99211 OFF/OP EST MAY X REQ PHY/QHP: CPT | Performed by: STUDENT IN AN ORGANIZED HEALTH CARE EDUCATION/TRAINING PROGRAM

## 2022-01-01 PROCEDURE — 36415 COLL VENOUS BLD VENIPUNCTURE: CPT

## 2022-01-01 PROCEDURE — 94760 N-INVAS EAR/PLS OXIMETRY 1: CPT

## 2022-01-01 PROCEDURE — 99220 PR INITIAL OBSERVATION CARE,LEVL III: CPT | Performed by: HOSPITALIST

## 2022-01-01 PROCEDURE — 93005 ELECTROCARDIOGRAM TRACING: CPT

## 2022-01-01 PROCEDURE — 95816 EEG AWAKE AND DROWSY: CPT | Performed by: STUDENT IN AN ORGANIZED HEALTH CARE EDUCATION/TRAINING PROGRAM

## 2022-01-01 PROCEDURE — 700105 HCHG RX REV CODE 258: Performed by: HOSPITALIST

## 2022-01-01 PROCEDURE — 93308 TTE F-UP OR LMTD: CPT

## 2022-01-01 PROCEDURE — 80048 BASIC METABOLIC PNL TOTAL CA: CPT

## 2022-01-01 PROCEDURE — 96372 THER/PROPH/DIAG INJ SC/IM: CPT

## 2022-01-01 RX ORDER — ONDANSETRON 4 MG/1
4 TABLET, ORALLY DISINTEGRATING ORAL EVERY 4 HOURS PRN
Status: DISCONTINUED | OUTPATIENT
Start: 2022-01-01 | End: 2022-01-01 | Stop reason: HOSPADM

## 2022-01-01 RX ORDER — MEMANTINE HYDROCHLORIDE 10 MG/1
10 TABLET ORAL 2 TIMES DAILY
Qty: 60 TABLET | Refills: 5 | Status: SHIPPED | OUTPATIENT
Start: 2022-01-01 | End: 2023-01-01

## 2022-01-01 RX ORDER — AMOXICILLIN 250 MG
2 CAPSULE ORAL 2 TIMES DAILY
Status: DISCONTINUED | OUTPATIENT
Start: 2022-01-01 | End: 2022-01-01 | Stop reason: HOSPADM

## 2022-01-01 RX ORDER — ACETAMINOPHEN 325 MG/1
650 TABLET ORAL EVERY 6 HOURS PRN
Status: DISCONTINUED | OUTPATIENT
Start: 2022-01-01 | End: 2022-01-01 | Stop reason: HOSPADM

## 2022-01-01 RX ORDER — POLYETHYLENE GLYCOL 3350 17 G/17G
1 POWDER, FOR SOLUTION ORAL
Status: DISCONTINUED | OUTPATIENT
Start: 2022-01-01 | End: 2022-01-01 | Stop reason: HOSPADM

## 2022-01-01 RX ORDER — ENOXAPARIN SODIUM 100 MG/ML
40 INJECTION SUBCUTANEOUS DAILY
Status: DISCONTINUED | OUTPATIENT
Start: 2022-01-01 | End: 2022-01-01

## 2022-01-01 RX ORDER — HYDRALAZINE HYDROCHLORIDE 20 MG/ML
10 INJECTION INTRAMUSCULAR; INTRAVENOUS EVERY 4 HOURS PRN
Status: DISCONTINUED | OUTPATIENT
Start: 2022-01-01 | End: 2022-01-01 | Stop reason: HOSPADM

## 2022-01-01 RX ORDER — MIRTAZAPINE 15 MG/1
TABLET, FILM COATED ORAL
Qty: 90 TABLET | Refills: 3 | Status: SHIPPED | OUTPATIENT
Start: 2022-01-01 | End: 2023-01-01

## 2022-01-01 RX ORDER — MEMANTINE HYDROCHLORIDE 10 MG/1
10 TABLET ORAL 2 TIMES DAILY
Qty: 60 TABLET | Refills: 5 | Status: SHIPPED | OUTPATIENT
Start: 2022-01-01 | End: 2022-01-01 | Stop reason: SDUPTHER

## 2022-01-01 RX ORDER — MIRTAZAPINE 15 MG/1
15 TABLET, FILM COATED ORAL
Status: DISCONTINUED | OUTPATIENT
Start: 2022-01-01 | End: 2022-01-01 | Stop reason: HOSPADM

## 2022-01-01 RX ORDER — MEMANTINE HYDROCHLORIDE 10 MG/1
10 TABLET ORAL 2 TIMES DAILY
Status: DISCONTINUED | OUTPATIENT
Start: 2022-01-01 | End: 2022-01-01 | Stop reason: HOSPADM

## 2022-01-01 RX ORDER — DONEPEZIL HYDROCHLORIDE 10 MG/1
TABLET, FILM COATED ORAL
Qty: 90 TABLET | Refills: 3 | Status: SHIPPED | OUTPATIENT
Start: 2022-01-01 | End: 2023-01-01

## 2022-01-01 RX ORDER — BISACODYL 10 MG
10 SUPPOSITORY, RECTAL RECTAL
Status: DISCONTINUED | OUTPATIENT
Start: 2022-01-01 | End: 2022-01-01 | Stop reason: HOSPADM

## 2022-01-01 RX ORDER — DONEPEZIL HYDROCHLORIDE 10 MG/1
10 TABLET, FILM COATED ORAL EVERY EVENING
Qty: 90 TABLET | Refills: 3 | Status: SHIPPED | OUTPATIENT
Start: 2022-01-01 | End: 2022-01-01

## 2022-01-01 RX ORDER — ONDANSETRON 2 MG/ML
4 INJECTION INTRAMUSCULAR; INTRAVENOUS EVERY 4 HOURS PRN
Status: DISCONTINUED | OUTPATIENT
Start: 2022-01-01 | End: 2022-01-01 | Stop reason: HOSPADM

## 2022-01-01 RX ORDER — SODIUM CHLORIDE 9 MG/ML
INJECTION, SOLUTION INTRAVENOUS CONTINUOUS
Status: DISCONTINUED | OUTPATIENT
Start: 2022-01-01 | End: 2022-01-01 | Stop reason: HOSPADM

## 2022-01-01 RX ORDER — DONEPEZIL HYDROCHLORIDE 5 MG/1
10 TABLET, FILM COATED ORAL NIGHTLY
Status: DISCONTINUED | OUTPATIENT
Start: 2022-01-01 | End: 2022-01-01 | Stop reason: HOSPADM

## 2022-01-01 RX ORDER — ENOXAPARIN SODIUM 100 MG/ML
30 INJECTION SUBCUTANEOUS DAILY
Status: DISCONTINUED | OUTPATIENT
Start: 2022-01-01 | End: 2022-01-01 | Stop reason: HOSPADM

## 2022-01-01 RX ADMIN — ENOXAPARIN SODIUM 30 MG: 30 INJECTION SUBCUTANEOUS at 18:23

## 2022-01-01 RX ADMIN — SENNOSIDES AND DOCUSATE SODIUM 2 TABLET: 50; 8.6 TABLET ORAL at 05:56

## 2022-01-01 RX ADMIN — DONEPEZIL HYDROCHLORIDE 10 MG: 5 TABLET, FILM COATED ORAL at 21:07

## 2022-01-01 RX ADMIN — MIRTAZAPINE 15 MG: 15 TABLET, FILM COATED ORAL at 21:07

## 2022-01-01 RX ADMIN — SODIUM CHLORIDE: 9 INJECTION, SOLUTION INTRAVENOUS at 07:56

## 2022-01-01 RX ADMIN — MEMANTINE HYDROCHLORIDE 10 MG: 10 TABLET ORAL at 05:56

## 2022-01-01 RX ADMIN — MEMANTINE HYDROCHLORIDE 10 MG: 10 TABLET ORAL at 21:07

## 2022-01-01 RX ADMIN — HYDRALAZINE HYDROCHLORIDE 10 MG: 20 INJECTION INTRAMUSCULAR; INTRAVENOUS at 05:56

## 2022-01-01 ASSESSMENT — FIBROSIS 4 INDEX
FIB4 SCORE: 2.41
FIB4 SCORE: 3.22
FIB4 SCORE: 2.41
FIB4 SCORE: 3.22

## 2022-01-01 ASSESSMENT — PATIENT HEALTH QUESTIONNAIRE - PHQ9
2. FEELING DOWN, DEPRESSED, IRRITABLE, OR HOPELESS: NOT AT ALL
1. LITTLE INTEREST OR PLEASURE IN DOING THINGS: NOT AT ALL
SUM OF ALL RESPONSES TO PHQ9 QUESTIONS 1 AND 2: 0

## 2022-01-01 ASSESSMENT — PAIN DESCRIPTION - PAIN TYPE: TYPE: ACUTE PAIN

## 2022-03-27 DIAGNOSIS — F03.90 DEMENTIA WITHOUT BEHAVIORAL DISTURBANCE, UNSPECIFIED DEMENTIA TYPE: ICD-10-CM

## 2022-03-28 RX ORDER — MEMANTINE HYDROCHLORIDE 10 MG/1
TABLET ORAL
Qty: 60 TABLET | Refills: 5 | Status: SHIPPED | OUTPATIENT
Start: 2022-03-28 | End: 2022-01-01 | Stop reason: SDUPTHER

## 2022-04-04 SDOH — ECONOMIC STABILITY: TRANSPORTATION INSECURITY
IN THE PAST 12 MONTHS, HAS LACK OF RELIABLE TRANSPORTATION KEPT YOU FROM MEDICAL APPOINTMENTS, MEETINGS, WORK OR FROM GETTING THINGS NEEDED FOR DAILY LIVING?: NO

## 2022-04-04 SDOH — ECONOMIC STABILITY: FOOD INSECURITY: WITHIN THE PAST 12 MONTHS, YOU WORRIED THAT YOUR FOOD WOULD RUN OUT BEFORE YOU GOT MONEY TO BUY MORE.: NEVER TRUE

## 2022-04-04 SDOH — ECONOMIC STABILITY: INCOME INSECURITY: IN THE LAST 12 MONTHS, WAS THERE A TIME WHEN YOU WERE NOT ABLE TO PAY THE MORTGAGE OR RENT ON TIME?: NO

## 2022-04-04 SDOH — ECONOMIC STABILITY: FOOD INSECURITY: WITHIN THE PAST 12 MONTHS, THE FOOD YOU BOUGHT JUST DIDN'T LAST AND YOU DIDN'T HAVE MONEY TO GET MORE.: NEVER TRUE

## 2022-04-04 SDOH — ECONOMIC STABILITY: TRANSPORTATION INSECURITY
IN THE PAST 12 MONTHS, HAS THE LACK OF TRANSPORTATION KEPT YOU FROM MEDICAL APPOINTMENTS OR FROM GETTING MEDICATIONS?: NO

## 2022-04-04 SDOH — ECONOMIC STABILITY: HOUSING INSECURITY
IN THE LAST 12 MONTHS, WAS THERE A TIME WHEN YOU DID NOT HAVE A STEADY PLACE TO SLEEP OR SLEPT IN A SHELTER (INCLUDING NOW)?: NO

## 2022-04-04 SDOH — ECONOMIC STABILITY: TRANSPORTATION INSECURITY
IN THE PAST 12 MONTHS, HAS LACK OF TRANSPORTATION KEPT YOU FROM MEETINGS, WORK, OR FROM GETTING THINGS NEEDED FOR DAILY LIVING?: NO

## 2022-04-04 SDOH — ECONOMIC STABILITY: INCOME INSECURITY: HOW HARD IS IT FOR YOU TO PAY FOR THE VERY BASICS LIKE FOOD, HOUSING, MEDICAL CARE, AND HEATING?: HARD

## 2022-04-04 SDOH — HEALTH STABILITY: PHYSICAL HEALTH: ON AVERAGE, HOW MANY DAYS PER WEEK DO YOU ENGAGE IN MODERATE TO STRENUOUS EXERCISE (LIKE A BRISK WALK)?: 0 DAYS

## 2022-04-04 SDOH — HEALTH STABILITY: PHYSICAL HEALTH: ON AVERAGE, HOW MANY MINUTES DO YOU ENGAGE IN EXERCISE AT THIS LEVEL?: 0 MIN

## 2022-04-04 SDOH — ECONOMIC STABILITY: HOUSING INSECURITY

## 2022-04-04 SDOH — HEALTH STABILITY: MENTAL HEALTH
STRESS IS WHEN SOMEONE FEELS TENSE, NERVOUS, ANXIOUS, OR CAN'T SLEEP AT NIGHT BECAUSE THEIR MIND IS TROUBLED. HOW STRESSED ARE YOU?: ONLY A LITTLE

## 2022-04-04 ASSESSMENT — SOCIAL DETERMINANTS OF HEALTH (SDOH)
IN A TYPICAL WEEK, HOW MANY TIMES DO YOU TALK ON THE PHONE WITH FAMILY, FRIENDS, OR NEIGHBORS?: THREE TIMES A WEEK
HOW OFTEN DO YOU GET TOGETHER WITH FRIENDS OR RELATIVES?: ONCE A WEEK
HOW OFTEN DO YOU HAVE SIX OR MORE DRINKS ON ONE OCCASION: NEVER
HOW OFTEN DO YOU ATTEND CHURCH OR RELIGIOUS SERVICES?: NEVER
DO YOU BELONG TO ANY CLUBS OR ORGANIZATIONS SUCH AS CHURCH GROUPS UNIONS, FRATERNAL OR ATHLETIC GROUPS, OR SCHOOL GROUPS?: NO
HOW HARD IS IT FOR YOU TO PAY FOR THE VERY BASICS LIKE FOOD, HOUSING, MEDICAL CARE, AND HEATING?: HARD
HOW OFTEN DO YOU ATTENT MEETINGS OF THE CLUB OR ORGANIZATION YOU BELONG TO?: NEVER
HOW OFTEN DO YOU ATTEND CHURCH OR RELIGIOUS SERVICES?: NEVER
WITHIN THE PAST 12 MONTHS, YOU WORRIED THAT YOUR FOOD WOULD RUN OUT BEFORE YOU GOT THE MONEY TO BUY MORE: NEVER TRUE
IN A TYPICAL WEEK, HOW MANY TIMES DO YOU TALK ON THE PHONE WITH FAMILY, FRIENDS, OR NEIGHBORS?: THREE TIMES A WEEK
DO YOU BELONG TO ANY CLUBS OR ORGANIZATIONS SUCH AS CHURCH GROUPS UNIONS, FRATERNAL OR ATHLETIC GROUPS, OR SCHOOL GROUPS?: NO
HOW OFTEN DO YOU GET TOGETHER WITH FRIENDS OR RELATIVES?: ONCE A WEEK
HOW OFTEN DO YOU ATTENT MEETINGS OF THE CLUB OR ORGANIZATION YOU BELONG TO?: NEVER
HOW OFTEN DO YOU HAVE A DRINK CONTAINING ALCOHOL: NEVER

## 2022-04-04 ASSESSMENT — LIFESTYLE VARIABLES
HOW OFTEN DO YOU HAVE A DRINK CONTAINING ALCOHOL: NEVER
HOW OFTEN DO YOU HAVE SIX OR MORE DRINKS ON ONE OCCASION: NEVER

## 2022-04-05 ENCOUNTER — OFFICE VISIT (OUTPATIENT)
Dept: MEDICAL GROUP | Facility: PHYSICIAN GROUP | Age: 87
End: 2022-04-05
Payer: MEDICARE

## 2022-04-05 VITALS
HEART RATE: 67 BPM | SYSTOLIC BLOOD PRESSURE: 100 MMHG | BODY MASS INDEX: 18.74 KG/M2 | OXYGEN SATURATION: 95 % | TEMPERATURE: 97.8 F | DIASTOLIC BLOOD PRESSURE: 50 MMHG | RESPIRATION RATE: 16 BRPM | WEIGHT: 101.8 LBS | HEIGHT: 62 IN

## 2022-04-05 DIAGNOSIS — R41.81 AGE-RELATED COGNITIVE DECLINE: ICD-10-CM

## 2022-04-05 DIAGNOSIS — H61.23 BILATERAL IMPACTED CERUMEN: ICD-10-CM

## 2022-04-05 DIAGNOSIS — N18.30 STAGE 3 CHRONIC KIDNEY DISEASE, UNSPECIFIED WHETHER STAGE 3A OR 3B CKD: ICD-10-CM

## 2022-04-05 PROCEDURE — 99214 OFFICE O/P EST MOD 30 MIN: CPT | Mod: 25 | Performed by: STUDENT IN AN ORGANIZED HEALTH CARE EDUCATION/TRAINING PROGRAM

## 2022-04-05 PROCEDURE — 69210 REMOVE IMPACTED EAR WAX UNI: CPT | Performed by: STUDENT IN AN ORGANIZED HEALTH CARE EDUCATION/TRAINING PROGRAM

## 2022-04-05 ASSESSMENT — FIBROSIS 4 INDEX: FIB4 SCORE: 2.38

## 2022-04-05 ASSESSMENT — PATIENT HEALTH QUESTIONNAIRE - PHQ9: CLINICAL INTERPRETATION OF PHQ2 SCORE: 0

## 2022-04-05 NOTE — PROCEDURES
Patient received bilateral lavage with hydrogen peroxide and warm water solution.  A curette was used bilaterally to dislodge substantial quantities of very hard cerumen.  Because of patient tolerance, disimpaction was incomplete although adequate to visualize the tympanic membranes.    Luis Antonio Ramirez PA-C

## 2022-04-05 NOTE — PROGRESS NOTES
Subjective:     CC:  Diagnoses of Bilateral impacted cerumen, Stage 3 chronic kidney disease, unspecified whether stage 3a or 3b CKD ([facility]), and Age-related cognitive decline were pertinent to this visit.    HISTORY OF THE PRESENT ILLNESS: Patient is a 89 y.o. female. This pleasant patient is here today to establish care.  She is originally from [place].  She is here today with her son.  Her prior PCP was Dr. Samantha Godfrey. Patient presents today with no acute complaints.    1.  Age-related cognitive decline  After recent bout of COVID-19 which took her 's life the patient began to have some noticeable cognitive decline.  At this point her son moved to town to be able to help care for her.  She does still live on her own although her son drops in to see her every day.  She is able to bathe and dress herself.  She presents today well-groomed in a matching outfit.  She does not use a cane or walker and is able to manage all of her own medications at this point.    2.  Stage III chronic kidney disease, unspecified whether stage IIIa or IIIb CKD ([facility])  September 2021 BMP indicates GFR 47.  Patient knows to not use NSAIDs.    3.  Bilateral impacted cerumen.  Finding today on physical exam.  Patient says her hearing is still fairly good.      Active Diagnosis:    Patient Active Problem List   Diagnosis   • Anemia   • Osteopenia   • Dyslipidemia (high LDL; low HDL)   • CKD (chronic kidney disease) stage 3, GFR 30-59 ml/min ([facility])   • OA (osteoarthritis)   • Leg cramps   • Cervical spine arthritis   • Mass of finger of left hand   • Age-related cognitive decline          Current Outpatient Medications Ordered in Epic   Medication Sig Dispense Refill   • memantine (NAMENDA) 10 MG Tab TAKE 1 TABLET BY MOUTH TWICE DAILY 60 Tablet 5   • fluorouracil (EFUDEX) 5 % cream      • Acetaminophen (TYLENOL ARTHRITIS PAIN PO) Take 1 Tablet by mouth as needed (pain).     • donepezil (ARICEPT) 10 MG tablet Take 1 Tablet by mouth  "every evening. 90 Tablet 3   • Hydrocortisone Acetate 1 % Cream Apply to affected area twice daily 28 g 1   • mirtazapine (REMERON) 15 MG Tab Take 1 tablet by mouth at bedtime. 90 tablet 3     No current Epic-ordered facility-administered medications on file.     ROS:   Gen: No fevers/chills, no changes in weight  HEENT: No changes in vision/hearing, sore throat.  Pulm: No cough, unexplained SOB.  CV: No chest pain/pressure, no palpitations  GI: No nausea/vomiting, no diarrhea  : No dysuria/nocturia  MSk: No myalgias  Skin: Numerous skin issues for which she sees dermatology.  Next appointment in 2 weeks.  Neuro: No headaches, no numbness/tingling  Heme/Lymph: no easy bruising      Objective:     Exam: /50 (BP Location: Left arm, Patient Position: Sitting, BP Cuff Size: Adult)   Pulse 67   Temp 36.6 °C (97.8 °F) (Temporal)   Resp 16   Ht 1.575 m (5' 2\")   Wt 46.2 kg (101 lb 12.8 oz)   SpO2 95%  Body mass index is 18.62 kg/m².    General: Normal appearing. No distress.  HEENT: Normocephalic. Eyes conjunctiva clear lids without ptosis, pupils equal and reactive to light accommodation. Ears normal shape and contour, canals are mostly clear bilaterally after lavage, tympanic membranes are benign, nasal mucosa benign, oropharynx is without erythema, edema or exudates.   Neck: Supple without JVD. Thyroid is not enlarged.  Pulmonary: Clear to ausculation.  Normal effort. No rales, ronchi, or wheezing.  Cardiovascular: Regular rate and rhythm without murmur. Radial pulses are intact and equal bilaterally.  Abdomen: Nondistended.    Neurologic: Grossly nonfocal.  CN II through XII intact.  Lymph: No cervical or supraclavicular lymph nodes are palpable  Skin: Warm and dry.  No obvious lesions.  Musculoskeletal: Normal gait. No extremity cyanosis, clubbing, or edema.  Psych: Normal mood and affect. Alert and oriented x3. Judgment and insight are normal.    A chaperone was offered to the patient during today's " exam. Patient declined chaperone.    Labs:   9/21/2021:  -CBC, WNL  -BMP showing BUN of 24 and GFR of 47.    Assessment & Plan:   89 y.o. female with the following -    1.  Age-related cognitive decline  -Chronic, progressive.  This condition is managed by neurology.  -Continue donepezil 10 mg daily.  -Continue memantine 10 mg twice daily.  -Mirtazapine 15 mg nightly.    2.  Stage III chronic kidney disease, unspecified whether stage IIIa or IIIb CKD (HCC)  -Chronic.  -Avoid NSAIDs  -Tylenol is okay.    3.  Bilateral impacted cerumen.  -Acute.  -Auditory canal lavage provided in clinic today.  See procedure note.    4.  Encounter to establish care  -I recommended the patient receive their shingles, Tdap and COVID-19 booster at the pharmacy of their choice.      Return in about 1 year (around 4/5/2023).  Per patient and son preference.    Please note that this dictation was created using voice recognition software. I have made every reasonable attempt to correct obvious errors, but I expect that there are errors of grammar and possibly content that I did not discover before finalizing the note.      Luis Antonio Ramirez PA-C 4/5/2022

## 2022-04-12 ENCOUNTER — APPOINTMENT (RX ONLY)
Dept: URBAN - METROPOLITAN AREA CLINIC 4 | Facility: CLINIC | Age: 87
Setting detail: DERMATOLOGY
End: 2022-04-12

## 2022-04-12 DIAGNOSIS — Z86.007 PERSONAL HISTORY OF IN-SITU NEOPLASM OF SKIN: ICD-10-CM

## 2022-04-12 DIAGNOSIS — L57.0 ACTINIC KERATOSIS: ICD-10-CM

## 2022-04-12 DIAGNOSIS — Z71.89 OTHER SPECIFIED COUNSELING: ICD-10-CM

## 2022-04-12 DIAGNOSIS — L72.0 EPIDERMAL CYST: ICD-10-CM

## 2022-04-12 PROBLEM — D48.5 NEOPLASM OF UNCERTAIN BEHAVIOR OF SKIN: Status: ACTIVE | Noted: 2022-04-12

## 2022-04-12 PROCEDURE — ? COUNSELING

## 2022-04-12 PROCEDURE — 11102 TANGNTL BX SKIN SINGLE LES: CPT

## 2022-04-12 PROCEDURE — 17003 DESTRUCT PREMALG LES 2-14: CPT

## 2022-04-12 PROCEDURE — ? LIQUID NITROGEN

## 2022-04-12 PROCEDURE — ? DIAGNOSIS COMMENT

## 2022-04-12 PROCEDURE — 99212 OFFICE O/P EST SF 10 MIN: CPT | Mod: 25

## 2022-04-12 PROCEDURE — 17000 DESTRUCT PREMALG LESION: CPT | Mod: 59

## 2022-04-12 PROCEDURE — ? BIOPSY BY SHAVE METHOD

## 2022-04-12 ASSESSMENT — LOCATION DETAILED DESCRIPTION DERM
LOCATION DETAILED: RIGHT RADIAL DORSAL HAND
LOCATION DETAILED: RIGHT LATERAL TEMPLE
LOCATION DETAILED: LEFT CENTRAL LATERAL NECK
LOCATION DETAILED: LEFT SUPERIOR FOREHEAD

## 2022-04-12 ASSESSMENT — LOCATION SIMPLE DESCRIPTION DERM
LOCATION SIMPLE: LEFT FOREHEAD
LOCATION SIMPLE: RIGHT HAND
LOCATION SIMPLE: NECK
LOCATION SIMPLE: RIGHT TEMPLE

## 2022-04-12 ASSESSMENT — LOCATION ZONE DERM
LOCATION ZONE: HAND
LOCATION ZONE: NECK
LOCATION ZONE: FACE

## 2022-04-12 NOTE — HPI: MEDICATION (5-FLUOROURACIL)
How Severe Are The Lesions?: mild
Is This A New Presentation, Or A Follow-Up?: Follow Up 5-Fluorouracil Therapy
Additional History: \\n\\nPatient finished last two weeks of 5FU treatment and states area has improved.
How Many Weeks Of 5-Fu Have You Completed?: 2

## 2022-04-12 NOTE — PROCEDURE: DIAGNOSIS COMMENT
Detail Level: Detailed
Render Risk Assessment In Note?: no
Comment: Retreated with 5FU with good response. No evidence of residual or recurrent carcinoma

## 2022-04-12 NOTE — PROCEDURE: LIQUID NITROGEN
Consent: The patient's consent was obtained including but not limited to risks of crusting, scabbing, blistering, scarring, darker or lighter pigmentary change, recurrence, incomplete removal and infection.
Duration Of Freeze Thaw-Cycle (Seconds): 3
Post-Care Instructions: I reviewed with the patient in detail post-care instructions. Patient is to wear sunprotection, and avoid picking at any of the treated lesions. Pt may apply Vaseline to crusted or scabbing areas.
Render Post-Care Instructions In Note?: no
Detail Level: Detailed
Show Aperture Variable?: Yes

## 2022-05-26 NOTE — TELEPHONE ENCOUNTER
Received request via: Pharmacy    Was the patient seen in the last year in this department? Yes    LV: 12/13/21  FV: 6/20/22    Does the patient have an active prescription (recently filled or refills available) for medication(s) requested? No

## 2022-06-21 NOTE — PROGRESS NOTES
"NEUROLOGY FOLLOW-UP - 06/21/2022   REFERRING PROVIDER  No referring provider defined for this encounter.    PCP  Luis Antonio Ramirez   [phone]   [facility] [[id number]]     REASON FOR VISIT: Lela Barber Orozco 89 y.o. female presents today for follow-up.       INTERVAL HISTORY:    Tripped up on the carpet recently but didn't hurt herself.    Lives with son and grandson who help her. They are getting hand rails around the house to help her prevet fallls./    Tolerating namenda and aricept. In fact she stopped taking them for a week and there was a notable worsening of her cognition, orientation, that resolved with resumption of the medication.    Patient's PMH, PSH, FH, and SH were reviewed.    ROS: All review of systems complete and are negative except as documented    CURRENT MEDICATIONS AT THE TIME OF THIS ENCOUNTER:    Current Outpatient Medications:   •  memantine, 10 mg, Oral, BID  •  donepezil, 10 mg, Oral, Q EVENING  •  mirtazapine, TAKE 1 TABLET BY MOUTH ONCE DAILY AT BEDTIME, Taking  •  fluorouracil, , Taking  •  Acetaminophen (TYLENOL ARTHRITIS PAIN PO), 1 Tablet, Oral, PRN, Taking  •  Hydrocortisone Acetate, Apply to affected area twice daily, Taking     EXAM:   Encounter Vitals  /76 (BP Location: Left arm, Patient Position: Sitting, BP Cuff Size: Adult)   Pulse 82   Ht 1.575 m (5' 2\")   Wt 47.6 kg (104 lb 15 oz)   SpO2 93%            Physical Exam:  Physical Exam  Vitals reviewed.   Constitutional:       General: She is awake.      Appearance: Normal appearance.   HENT:      Nose: Nose normal.      Mouth/Throat:      Mouth: Mucous membranes are dry.      Pharynx: Oropharynx is clear. No oropharyngeal exudate or posterior oropharyngeal erythema.   Eyes:      General:         Right eye: No discharge.         Left eye: No discharge.   Cardiovascular:      Rate and Rhythm: Normal rate and regular rhythm.      Pulses: Normal pulses.      Heart sounds: Normal heart sounds. "   Pulmonary:      Effort: Pulmonary effort is normal. No respiratory distress.      Breath sounds: Normal breath sounds. No stridor. No wheezing or rhonchi.   Musculoskeletal:         General: No swelling, tenderness or deformity.      Cervical back: Normal range of motion and neck supple.   Skin:     Coloration: Skin is not jaundiced.      Findings: Lesion present. No bruising, erythema or rash.      Comments: Skin cancer, right temporal   Neurological:      Mental Status: She is alert.      Deep Tendon Reflexes: Strength normal.        Neurological Exam   Neurological Exam  Mental Status  Awake and alert.  Alert to year but not to president. Thought it was 1940s. 0/3 in recall, but with clues she was 3/3. Some difficult in saying months, spelling WORLD.    Motor   Strength is 5/5 throughout all four extremities.    Sensory  Sensation is intact to light touch, pinprick, vibration and proprioception in all four extremities.    Reflexes                                            Right                      Left  Brachioradialis                    Tr                         Tr  Biceps                                 Tr                         Tr  Triceps                                Tr                         Tr  Patellar                                Tr                         Tr  Achilles                               Tr                            Coordination  Right: Finger-to-nose normal.Left: Finger-to-nose normal.    Gait  Casual gait is normal including stance, stride, and arm swing.       ALL DATA (I.e. labs, procedures, imaging, reports, clinical notes, etc.) FROM [facility] AND/OR OUTSIDE SOURCES, IF AVAILABLE, PERSONALLY REVIEWED:       ASSESSMENT, EDUCATION, AND COUNSELING:  This is a 89 y.o. female patient who presents to the neurology clinic. We had an extensive discussion about the patient's symptoms, signs, and work-up to date, if any. We discussed potential and/or definitive diagnoses, work-up, and  potential treatments.       PLAN:  If applicable, the work-up such as labs, imaging, procedures, and/or other testing, referrals, and/or recommended treatment strategies are listed below.    Visit Diagnoses     ICD-10-CM   1. Dementia without behavioral disturbance, unspecified dementia type (HCC)  F03.90   2. Poor appetite  R63.0   3. Weight loss  R63.4   4. Imbalance  R26.89   5. Cervical spine arthritis  M47.812   6. Leg cramps  R25.2   7. Osteoarthritis of multiple joints, unspecified osteoarthritis type  M15.9   8. Dyslipidemia (high LDL; low HDL)  E78.5   9. Memory loss  R41.3        Orders Placed This Encounter   • Referral to Neurology   • memantine (NAMENDA) 10 MG Tab   • donepezil (ARICEPT) 10 MG tablet        Patient with late onset dementia without behavioral distrruabnce who is clinically stable on aricept and namenda. She remains socially and physically active, eats healthy, sleeps well, and keeps her mind sharp with reading and puzzles. She has a great support system as her son and grandson live with her at home and help her. I will refer her to our dementia specialist Dr. Aguirre for ongoing care. F/u in 12 months, sooner if needed.    BILLING DOCUMENTATION:     I spent a total of I spent a total of 20 minutes on the day of the visit.    minutes of face-to-face time in this visit. Over 50% of the time of the visit today was spent on counseling and/or coordination of care wtih the patient and/or family, as above in assessment in plan.    Rohit Ceballos MD  Epilepsy and General Neurology  Department of Neurology  Clinical  of Neurology [facility].

## 2022-10-12 ENCOUNTER — APPOINTMENT (RX ONLY)
Dept: URBAN - METROPOLITAN AREA CLINIC 4 | Facility: CLINIC | Age: 87
Setting detail: DERMATOLOGY
End: 2022-10-12

## 2022-10-12 DIAGNOSIS — Z86.007 PERSONAL HISTORY OF IN-SITU NEOPLASM OF SKIN: ICD-10-CM

## 2022-10-12 DIAGNOSIS — Z71.89 OTHER SPECIFIED COUNSELING: ICD-10-CM

## 2022-10-12 DIAGNOSIS — L57.0 ACTINIC KERATOSIS: ICD-10-CM

## 2022-10-12 PROBLEM — C44.329 SQUAMOUS CELL CARCINOMA OF SKIN OF OTHER PARTS OF FACE: Status: ACTIVE | Noted: 2022-10-12

## 2022-10-12 PROCEDURE — 17000 DESTRUCT PREMALG LESION: CPT

## 2022-10-12 PROCEDURE — ? ADDITIONAL NOTES

## 2022-10-12 PROCEDURE — 99212 OFFICE O/P EST SF 10 MIN: CPT | Mod: 25

## 2022-10-12 PROCEDURE — ? LIQUID NITROGEN

## 2022-10-12 PROCEDURE — ? COUNSELING

## 2022-10-12 ASSESSMENT — LOCATION DETAILED DESCRIPTION DERM
LOCATION DETAILED: RIGHT LATERAL TEMPLE
LOCATION DETAILED: LEFT CENTRAL LATERAL NECK

## 2022-10-12 ASSESSMENT — LOCATION SIMPLE DESCRIPTION DERM
LOCATION SIMPLE: RIGHT TEMPLE
LOCATION SIMPLE: NECK

## 2022-10-12 ASSESSMENT — LOCATION ZONE DERM
LOCATION ZONE: NECK
LOCATION ZONE: FACE

## 2022-10-12 NOTE — PROCEDURE: ADDITIONAL NOTES
Additional Notes: Pathology reiterated. Family and patient previously declined any surgical intervention and understand the potential risk for incomplete treatment. Discussed cryosurgery. Tatianna is in clinic today with a family friend Jesus. Jesus was able to get Jarred Tatianna's POA, on the phone. Treatment options reiterated. Jarred elects and consents to LN2 today.
Render Risk Assessment In Note?: no
Detail Level: Simple

## 2022-10-12 NOTE — PROCEDURE: LIQUID NITROGEN
Detail Level: Detailed
Render Post-Care Instructions In Note?: no
Show Aperture Variable?: Yes
Consent: The patient's consent was obtained including but not limited to risks of crusting, scabbing, blistering, scarring, darker or lighter pigmentary change, recurrence, incomplete removal and infection.
Post-Care Instructions: I reviewed with the patient in detail post-care instructions. Patient is to wear sunprotection, and avoid picking at any of the treated lesions. Pt may apply Vaseline to crusted or scabbing areas.
Duration Of Freeze Thaw-Cycle (Seconds): 3

## 2022-11-17 PROBLEM — R55 SYNCOPE AND COLLAPSE: Status: ACTIVE | Noted: 2022-01-01
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
## 2022-11-17 NOTE — PROCEDURES
INPATIENT ROUTINE VIDEO ELECTROENCEPHALOGRAM REPORT      REFERRING PROVIDER: Dr. Blair    DOS: 11/17/2022     TOTAL RECORDING TIME: 0 hours and 22 minutes of total recording time    INDICATION:  Pansy June Robinson 89 y.o. female presenting with altered mental status    CURRENT ANTI-SEIZURE MEDICATIONS:   No AEDs    TECHNIQUE: Routine VEEG was set up by a Neurodiagnostic technologist who performed education to the patient and staff. A minimum of 23 electrodes and 23 channel recording was setup and performed by Neurodiagnostic technologist, in accordance with the international 10-20 system. The study was reviewed in bipolar and referential montages. The recording examined the patient in the  awake and drowsy state(s).     DESCRIPTION OF THE RECORD:  The background was continuous, symmetrical, and showed a 9 Hz posterior dominant rhythm The background was composed of a mixture of delta/theta/alpha activity . Reactivity was present. Spontaneous variability was present. State changes were present.   EEG Sleep: N2 sleep architecture was not seen.      ACTIVATION PROCEDURES:   Intermittent Photic stimulation was performed in a stepwise fashion from 1 to 30 Hz and did not elicited any abnormalities on EEG.     ICTAL AND INTERICTAL FINDINGS:   No focal or generalized epileptiform activity noted.     Intermittent independent bitemporal slowing.    No definite electrographic or electroclinical seizures.     EKG: Sampling of the EKG recording showed an abnormal rhythm      EVENTS:  None    INTERPRETATION:   Abnormal video EEG recording in the awake and drowsy state(s):  - Mild background slowing suggestive of diffuse/multifocal cerebral dysfunction and consistent with a non-specific encephalopathy. Clinical correlation recommended.  - Intermittent independent bitemporal slowing suggestive of bitemporal dysfunction, which, in light of the patient's age, is an expected age-related finding.  - Epileptiform discharges: No  epileptiform discharges or other epileptiform phenomena seen.   - No seizures. Clinical correlation is recommended.      Note: This EEG does not rule the possibility of seizures  If the clinical suspicion remains high for seizures, a prolonged recording to capture clinical or subclinical events may be helpful.        Rohit Ceballos MD  Department of Neurology at [facility]  General Neurologist and Epileptologist  Director of [facility]'s Level III Comprehensive Epilepsy Program  Professor of Clinical Neurology, [facility].   Phone: [phone]  Fax: [phone]  E-mail: [email or web address]

## 2022-11-17 NOTE — ASSESSMENT & PLAN NOTE
Etiology unclear but we will work-up for possible cardiac phenomenon by monitoring on telemetry(assessing for life-threatening arrhythmia) and getting serial troponin    We will check an echocardiogram to assess for wall motion abnormalities and valvular dysfunction    With  witnessed convulsions that may be related to syncopal episode but will check an EEG to assess for abnormal electric brainwave abnormalities    Patient is not orthostatic    We will check a UA and x-ray of the chest to look for possible infection

## 2022-11-17 NOTE — ED TRIAGE NOTES
".  Chief Complaint   Patient presents with   • Syncope     Patient was at home with her caregiver and had an assisted fall to the floor where the caregiver reported she went limp and was not responding for approximately 30 seconds. No head strike. A/Ox 1 at baseline. Blood glucose 242 en route.        89 yo female BIB [facility] to triage for above complaint. Patient was given 500cc's of fluid en route. EKG done upon arrival.        BP (!) 151/78   Pulse 80   Temp 36.1 °C (97 °F) (Temporal)   Resp 18   Ht 1.575 m (5' 2\")   Wt 43.1 kg (95 lb)   LMP  (LMP Unknown)   SpO2 98%   BMI 17.38 kg/m²     "
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
## 2022-11-18 PROBLEM — R55 SYNCOPE AND COLLAPSE: Status: RESOLVED | Noted: 2022-01-01 | Resolved: 2022-01-01

## 2022-11-18 NOTE — PROGRESS NOTES
Patient unable to sign discharge paperwork due to orientation level. This RN contacted patient's son, Cullen Orozco, to review the discharge instructions. Patient's son has no questions at this time.

## 2022-11-18 NOTE — PROGRESS NOTES
Patient dressed, IV removed and discharge paperwork given to caregiver. No questions at this time from family or caregiver. Patient taken to car via wheelchair and assisted in to vehicle. All belongings with the patient upon discharge.

## 2022-11-18 NOTE — PROGRESS NOTES
Pt  arrived from Ed. Pt is aox1, RA, Vitals - low BP. No fever. No pain. Tele sitter. On Tele. Continue to manage pain, fall risk and comfort.

## 2022-11-18 NOTE — PROGRESS NOTES
Report received from night shift RN. Patient alert to self, in bed resting comfortably. VSS, denies pain, bed in lowest position and locked with bed alarm on, call light in reach.  Tele sitter active in room.

## 2022-11-18 NOTE — DISCHARGE PLANNING
Discussed with Dr. Blair and plan is [facility]. Called and spoke with son Cullen and received  choice via telephone. Choice form filled out with telephone consent. Choice form faxed to [employer]. Message sent to [employer] to update.

## 2022-11-18 NOTE — DISCHARGE PLANNING
Received Choice form at 1964  Agency/Facility Name: [facility]  Referral sent per Choice form @ [phone]

## 2022-11-18 NOTE — DISCHARGE INSTRUCTIONS
Discharge Instructions    Discharged to home by car with relative. Discharged via wheelchair, hospital escort: Yes.  Special equipment needed: Not Applicable    Be sure to schedule a follow-up appointment with your primary care doctor or any specialists as instructed.     Discharge Plan:        I understand that a diet low in cholesterol, fat, and sodium is recommended for good health. Unless I have been given specific instructions below for another diet, I accept this instruction as my diet prescription.   Other diet:     Special Instructions: None    -Is this patient being discharged with medication to prevent blood clots?  No    Is patient discharged on Warfarin / Coumadin?   No       Syncope  Syncope is when you pass out (faint) for a short time. It is caused by a sudden decrease in blood flow to the brain. Signs that you may be about to pass out include:  Feeling dizzy or light-headed.  Feeling sick to your stomach (nauseous).  Seeing all white or all black.  Having cold, clammy skin.  If you pass out, get help right away. Call your local emergency services (911 in the [place] Do not drive yourself to the hospital.  Follow these instructions at home:  Watch for any changes in your symptoms. Take these actions to stay safe and help with your symptoms:  Lifestyle  Do not drive, use machinery, or play sports until your doctor says it is okay.  Do not drink alcohol.  Do not use any products that contain nicotine or tobacco, such as cigarettes and e-cigarettes. If you need help quitting, ask your doctor.  Drink enough fluid to keep your pee (urine) pale yellow.  General instructions  Take over-the-counter and prescription medicines only as told by your doctor.  If you are taking blood pressure or heart medicine, sit up and stand up slowly. Spend a few minutes getting ready to sit and then stand. This can help you feel less dizzy.  Have someone stay with you until you feel stable.  If you start to feel like you might  pass out, lie down right away and raise (elevate) your feet above the level of your heart. Breathe deeply and steadily. Wait until all of the symptoms are gone.  Keep all follow-up visits as told by your doctor. This is important.  Get help right away if:  You have a very bad headache.  You pass out once or more than once.  You have pain in your chest, belly, or back.  You have a very fast or uneven heartbeat (palpitations).  It hurts to breathe.  You are bleeding from your mouth or your bottom (rectum).  You have black or tarry poop (stool).  You have jerky movements that you cannot control (seizure).  You are confused.  You have trouble walking.  You are very weak.  You have vision problems.  These symptoms may be an emergency. Do not wait to see if the symptoms will go away. Get medical help right away. Call your local emergency services (911 in the [place] Do not drive yourself to the hospital.  Summary  Syncope is when you pass out (faint) for a short time. It is caused by a sudden decrease in blood flow to the brain.  Signs that you may be about to faint include feeling dizzy, light-headed, or sick to your stomach, seeing all white or all black, or having cold, clammy skin.  If you start to feel like you might pass out, lie down right away and raise (elevate) your feet above the level of your heart. Breathe deeply and steadily. Wait until all of the symptoms are gone.  This information is not intended to replace advice given to you by your health care provider. Make sure you discuss any questions you have with your health care provider.  Document Released: 06/05/2009 Document Revised: 01/30/2019 Document Reviewed: 01/30/2019  [employer] Patient Education © 2020 [employer]

## 2022-11-18 NOTE — CARE PLAN
The patient is Stable - Low risk of patient condition declining or worsening    Shift Goals  Clinical Goals: UA, safety, reorientation  Patient Goals: To go home  Family Goals: No family present    Progress made toward(s) clinical / shift goals:    Problem: Knowledge Deficit - Standard  Goal: Patient and family/care givers will demonstrate understanding of plan of care, disease process/condition, diagnostic tests and medications  Outcome: Met     Problem: Fall Risk  Goal: Patient will remain free from falls  Outcome: Met       Discussed the plan of care and diagnostics with family members of the patient. Family does not have any questions at this time. Patient remained free from falls.      Patient is not progressing towards the following goals:

## 2022-11-18 NOTE — FACE TO FACE
Face to Face Supporting Documentation - Home Health    The encounter with this patient was in whole or in part the primary reason for home health admission.    Date of encounter:   Patient:                    MRN:                       YOB: 2022  Lela Orozco  [id number]  5/28/1932     Home health to see patient for:  Skilled Nursing care for assessment, interventions & education  Pt, ot    Skilled need for:  Recent Deterioration of Health Status     Skilled nursing interventions to include:  Medications, pt, ot    Homebound status evidenced by:  Need the aid of supportive devices such as crutches, canes, wheelchairs or walkers. Leaving home requires a considerable and taxing effort. There is a normal inability to leave the home.    Community Physician to provide follow up care: Luis Antonio Ramirez P.A.-C.     Optional Interventions? No      I certify the face to face encounter for this home health care referral meets the CMS requirements and the encounter/clinical assessment with the patient was, in whole, or in part, for the medical condition(s) listed above, which is the primary reason for home health care. Based on my clinical findings: the service(s) are medically necessary, support the need for home health care, and the homebound criteria are met.  I certify that this patient has had a face to face encounter by myself.  Juno Blair M.D. - NPI: [id number]

## 2022-11-18 NOTE — PROGRESS NOTES
4 Eyes Skin Assessment Completed by BAIRON Mann and BAIRON Hare.    Head WDL  Ears WDL  Nose WDL  Mouth WDL  Neck WDL  Breast/Chest WDL  Shoulder Blades WDL  Spine WDL  (R) Arm/Elbow/Hand  some dryness Bruising dryness  (L) Arm/Elbow/Hand Bruising dryness  Abdomen WDL  Groin WDL  Scrotum/Coccyx/Buttocks WDL  (R) Leg WDL  (L) Leg WDL  (R) Heel/Foot/Toe WDL  (L) Heel/Foot/Toe WDL          Devices In Places Tele Box and Pulse Ox      Interventions In Place Pillows    Possible Skin Injury No    Pictures Uploaded Into Epic N/A  Wound Consult Placed N/A  RN Wound Prevention Protocol Ordered No

## 2022-11-18 NOTE — PROGRESS NOTES
Contacted the patient's caregiver, Ponce Joseph regarding discharge of the patient. Ponce will be en route to  the patient.

## 2022-11-19 NOTE — DISCHARGE SUMMARY
Discharge Summary    CHIEF COMPLAINT ON ADMISSION  Chief Complaint   Patient presents with    Syncope     Patient was at home with her caregiver and had an assisted fall to the floor where the caregiver reported she went limp and was not responding for approximately 30 seconds. No head strike. A/Ox 1 at baseline. Blood glucose 242 en route.        Reason for Admission  EMS     Admission Date  11/17/2022    CODE STATUS  Prior    HPI & HOSPITAL COURSE  This is a 89 y.o. female here with syncopal episode with an assisted fall.  There was no head trauma there was loss of consciousness.  Patient was transferred to [facility] for further evaluation.  Patient to have no evidence of infection that can be found.  She patient was not hypoglycemic.  No evidence of stroke..  No arrhythmia noted on telemetry.  EEG did not show any evidence of any abnormal brain waves spikes that would be consistent with seizure.  Patient was monitored overnight and given IV hydration and discharged home to the care of her caregiver      Therefore, she is discharged in good and stable condition to home with close outpatient follow-up.    The patient recovered much more quickly than anticipated on admission.    Discharge Date  11/18/2022    FOLLOW UP ITEMS POST DISCHARGE      DISCHARGE DIAGNOSES  Principal Problem (Resolved):    Syncope and collapse POA: Yes  Active Problems:    Age-related cognitive decline POA: Yes      FOLLOW UP  Future Appointments   Date Time Provider Department Center   4/3/2023 10:30 AM Luis Antonio Ramirez P.A.-C. [facility] follow-up provider specified.    MEDICATIONS ON DISCHARGE     Medication List        CHANGE how you take these medications        Instructions   donepezil 10 MG tablet  What changed: when to take this  Commonly known as: ARICEPT   TAKE 1 TABLET BY MOUTH ONCE DAILY IN THE EVENING.            CONTINUE taking these medications        Instructions   memantine 10 MG Tabs  Commonly  known as: Namenda   Take 1 Tablet by mouth 2 times a day.  Dose: 10 mg     mirtazapine 15 MG Tabs  Commonly known as: Remeron   TAKE 1 TABLET BY MOUTH ONCE DAILY AT BEDTIME              Allergies  Allergies   Allergen Reactions    Hydrocodone-Acetaminophen Nausea       DIET  No orders of the defined types were placed in this encounter.      ACTIVITY  As tolerated.  Weight bearing as tolerated    CONSULTATIONS      PROCEDURES      LABORATORY  Lab Results   Component Value Date    SODIUM 135 11/17/2022    POTASSIUM 3.9 11/17/2022    CHLORIDE 100 11/17/2022    CO2 24 11/17/2022    GLUCOSE 160 (H) 11/17/2022    BUN 22 11/17/2022    CREATININE 1.36 11/17/2022    CREATININE 1.6 (H) 05/04/2009        Lab Results   Component Value Date    WBC 5.0 11/17/2022    HEMOGLOBIN 13.6 11/17/2022    HEMATOCRIT 41.7 11/17/2022    PLATELETCT 187 11/17/2022        Total time of the discharge process exceeds 37 minutes.

## 2022-11-21 NOTE — PROGRESS NOTES
TCM complete. Patient with severe dementia and live in caretaker. [facility] has made contact. Good candidate for [facility].

## 2022-11-30 NOTE — DISCHARGE INSTRUCTIONS
ACTIVITY: Rest and take it easy for the first 24 hours.  A responsible adult is recommended to remain with you during that time.  It is normal to feel sleepy.  We encourage you to not do anything that requires balance, judgment or coordination.    MILD FLU-LIKE SYMPTOMS ARE NORMAL. YOU MAY EXPERIENCE GENERALIZED MUSCLE ACHES, THROAT IRRITATION, HEADACHE AND/OR SOME NAUSEA.    FOR 24 HOURS DO NOT:  Drive, operate machinery or run household appliances.  Drink beer or alcoholic beverages.   Make important decisions or sign legal documents.    SPECIAL INSTRUCTIONS: follow YAN instructions    DIET: To avoid nausea, slowly advance diet as tolerated, avoiding spicy or greasy foods for the first day.  Add more substantial food to your diet according to your physician's instructions.  INCREASE FLUIDS AND FIBER TO AVOID CONSTIPATION.      FOLLOW-UP APPOINTMENT:  A follow-up appointment should be arranged with your doctor; call to schedule.    You should CALL YOUR PHYSICIAN if you develop:  Fever greater than 101 degrees F.  Pain not relieved by medication, or persistent nausea or vomiting.  Excessive bleeding (blood soaking through dressing) or unexpected drainage from the wound.  Extreme redness or swelling around the incision site, drainage of pus or foul smelling drainage.  Inability to urinate or empty your bladder within 8 hours.  Problems with breathing or chest pain.    You should call 911 if you develop problems with breathing or chest pain.  If you are unable to contact your doctor or surgical center, you should go to the nearest emergency room or urgent care center.  Physician's telephone #: [phone]    If any questions arise, call your doctor.  If your doctor is not available, please feel free to call the Surgical Center at [phone]. The Contact Center is open Monday through Friday 7AM to 5PM and may speak to a nurse at [phone], or toll free at [phone].     A registered nurse may call you a  Past Medical History:   Diagnosis Date   • Hyperthyroidism 2013    pt sees an Endocrinologist    • Negative History of CA, HTN, DM, CAD, CVA, DVT, Asthma         Reviewed and accepted note.  Alert and cooperative  -burning, -itching, -tearing, -flashes/floater, -headache, -diplopia    Blur at near    presbyopia.  Explained accommodation and advised bifocal.    Mild Cataract.  Discussed natural course and future benefits of extraction.       few days after your surgery to see how you are doing after your procedure.    MEDICATIONS: Resume taking daily medication.  Take prescribed pain medication with food.  If no medication is prescribed, you may take non-aspirin pain medication if needed.  PAIN MEDICATION CAN BE VERY CONSTIPATING.  Take a stool softener or laxative such as senokot, pericolace, or milk of magnesia if needed.    Last pain medication given at _____________________.    If your physician has prescribed pain medication that includes Acetaminophen (Tylenol), do not take additional Acetaminophen (Tylenol) while taking the prescribed medication.    Depression / Suicide Risk    As you are discharged from this [facility] facility, it is important to learn how to keep safe from harming yourself.    Recognize the warning signs:  · Abrupt changes in personality, positive or negative- including increase in energy   · Giving away possessions  · Change in eating patterns- significant weight changes-  positive or negative  · Change in sleeping patterns- unable to sleep or sleeping all the time   · Unwillingness or inability to communicate  · Depression  · Unusual sadness, discouragement and loneliness  · Talk of wanting to die  · Neglect of personal appearance   · Rebelliousness- reckless behavior  · Withdrawal from people/activities they love  · Confusion- inability to concentrate     If you or a loved one observes any of these behaviors or has concerns about self-harm, here's what you can do:  · Talk about it- your feelings and reasons for harming yourself  · Remove any means that you might use to hurt yourself (examples: pills, rope, extension cords, firearm)  · Get professional help from the community (Mental Health, Substance Abuse, psychological counseling)  · Do not be alone:Call your Safe Contact- someone whom you trust who will be there for you.  · Call your local CRISIS HOTLINE 539-8314 or 958-775-9613  · Call your local Children's Mobile  Crisis Response Team [place] (685) 796-9193 or www.[employer]  · Call the toll free National Suicide Prevention Hotlines   · National Suicide Prevention Lifeline 781-691-NQUB (1894)  · National Hope Line Network 800-SUICIDE (376-9761)   37.1
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
## 2022-12-07 NOTE — PROGRESS NOTES
Subjective:     CC:  Diagnoses of Age-related cognitive decline and Fall, subsequent encounter were pertinent to this visit.    HISTORY OF THE PRESENT ILLNESS: Patient is a 89 y.o. female.  She is present with her son Elias today.  This pleasant patient is here today to discuss:    1. Age-related cognitive decline  Patient will be establishing with Dr. Luis Aguirre in neurology shortly.  Her cognitive decline continues.  She has received substantial help from home health nursing including physical therapy for strength maintenance and fall prevention.  She may need bridging of her neurology medications until she can meet with Dr. Aguirre.    2. Fall, subsequent encounter  Patient suffered a fall on 11/17/2022.  This fall was controlled by caregiver.  She did not strike her head.  There may have been loss of consciousness.  She was taken to ER where extensive evaluation was provided.  No definitive cause could be determined.  Patient was discharged home    Active Diagnosis:    Patient Active Problem List   Diagnosis    Anemia    Osteopenia    Dyslipidemia (high LDL; low HDL)    CKD (chronic kidney disease) stage 3, GFR 30-59 ml/min ([facility])    OA (osteoarthritis)    Leg cramps    Cervical spine arthritis    Mass of finger of left hand    Age-related cognitive decline    Fall      Current Outpatient Medications Ordered in Epic   Medication Sig Dispense Refill    memantine (NAMENDA) 10 MG Tab Take 1 Tablet by mouth 2 times a day. 60 Tablet 5    donepezil (ARICEPT) 10 MG tablet TAKE 1 TABLET BY MOUTH ONCE DAILY IN THE EVENING. (Patient taking differently: Take 10 mg by mouth every evening.) 90 Tablet 3    mirtazapine (REMERON) 15 MG Tab TAKE 1 TABLET BY MOUTH ONCE DAILY AT BEDTIME (Patient taking differently: Take 15 mg by mouth at bedtime.) 90 Tablet 3     No current Epic-ordered facility-administered medications on file.     ROS:   Gen: No fevers/chills, no changes in weight  See HPI.    Objective:     Exam: /72 (BP  Location: Right arm, Patient Position: Sitting, BP Cuff Size: Adult)   Pulse 80   Temp 36.5 °C (97.7 °F) (Temporal)   Resp 12   Ht 1.524 m (5')   Wt 40.3 kg (88 lb 12.8 oz)   SpO2 95%  Body mass index is 17.34 kg/m².    General: Normal appearing. No distress.  HEENT: Normocephalic. Eyes conjunctiva clear lids without ptosis. Pupils equal and reactive to light accommodation.   Neck: Supple without JVD. Thyroid is not enlarged.  Pulmonary: Clear to ausculation.  Normal effort. No rales, ronchi, or wheezing.  Cardiovascular: Regular rate and rhythm without murmur. Abdomen: Nondistended   neurologic: Grossly nonfocal.   Skin: Warm and dry.  No obvious lesions.  Musculoskeletal: Normal gait. No extremity cyanosis, clubbing, or edema.    A chaperone was offered to the patient during today's exam. Patient declined chaperone.    Labs:   11/17/2022:  -CBC reviewed.  -BMP showing GFR 37, glucose 160    11/18/2022:   -UA negative for infection    11/19/2022:  -Urine culture negative    Assessment & Plan:   89 y.o. female with the following -    1. Age-related cognitive decline  -Chronic, progressive.  -Patient needs new referral to home health for insurance purposes.  She receives substantial gain from physical therapy and strength maintenance and fall prevention.  Speech therapy has helped preserve her ability to communicate.  Skilled nursing provides multiple valuable services.  - Referral to Home Health    2. Fall, subsequent encounter  -Undiagnosed problem.  -Likely a commendation of cognitive decline and frailty.  Continued home health nursing is the most likely therapy to improve the patient's longevity and quality of life.  - Referral to Home Health    Return in about 3 months (around 3/7/2023).    Please note that this dictation was created using voice recognition software. I have made every reasonable attempt to correct obvious errors, but I expect that there are errors of grammar and possibly content that I did  not discover before finalizing the note.      Luis Antonio Ramirez PA-C 12/7/2022

## 2023-01-01 ENCOUNTER — HOME CARE VISIT (OUTPATIENT)
Dept: HOSPICE | Facility: HOSPICE | Age: 88
End: 2023-01-01
Payer: MEDICARE

## 2023-01-01 ENCOUNTER — HOSPITAL ENCOUNTER (OUTPATIENT)
Dept: RADIOLOGY | Facility: MEDICAL CENTER | Age: 88
End: 2023-05-02
Attending: PSYCHIATRY & NEUROLOGY
Payer: MEDICARE

## 2023-01-01 ENCOUNTER — DOCUMENTATION (OUTPATIENT)
Dept: NEUROLOGY | Facility: MEDICAL CENTER | Age: 88
End: 2023-01-01
Payer: MEDICARE

## 2023-01-01 ENCOUNTER — HOSPICE ADMISSION (OUTPATIENT)
Dept: HOSPICE | Facility: HOSPICE | Age: 88
End: 2023-01-01
Payer: MEDICARE

## 2023-01-01 ENCOUNTER — APPOINTMENT (OUTPATIENT)
Dept: RADIOLOGY | Facility: MEDICAL CENTER | Age: 88
DRG: 951 | End: 2023-01-01
Attending: STUDENT IN AN ORGANIZED HEALTH CARE EDUCATION/TRAINING PROGRAM
Payer: COMMERCIAL

## 2023-01-01 ENCOUNTER — OFFICE VISIT (OUTPATIENT)
Dept: NEUROLOGY | Facility: MEDICAL CENTER | Age: 88
End: 2023-01-01
Attending: PSYCHIATRY & NEUROLOGY
Payer: MEDICARE

## 2023-01-01 ENCOUNTER — HOSPITAL ENCOUNTER (OUTPATIENT)
Dept: LAB | Facility: MEDICAL CENTER | Age: 88
End: 2023-04-20
Attending: PSYCHIATRY & NEUROLOGY
Payer: MEDICARE

## 2023-01-01 ENCOUNTER — APPOINTMENT (OUTPATIENT)
Dept: RADIOLOGY | Facility: MEDICAL CENTER | Age: 88
DRG: 640 | End: 2023-01-01
Attending: EMERGENCY MEDICINE
Payer: MEDICARE

## 2023-01-01 ENCOUNTER — APPOINTMENT (OUTPATIENT)
Dept: RADIOLOGY | Facility: MEDICAL CENTER | Age: 88
DRG: 640 | End: 2023-01-01
Attending: HOSPITALIST
Payer: MEDICARE

## 2023-01-01 ENCOUNTER — TELEPHONE (OUTPATIENT)
Dept: MEDICAL GROUP | Facility: PHYSICIAN GROUP | Age: 88
End: 2023-01-01
Payer: MEDICARE

## 2023-01-01 ENCOUNTER — HOSPITAL ENCOUNTER (INPATIENT)
Facility: MEDICAL CENTER | Age: 88
LOS: 7 days | DRG: 640 | End: 2023-05-17
Attending: EMERGENCY MEDICINE | Admitting: HOSPITALIST
Payer: MEDICARE

## 2023-01-01 ENCOUNTER — HOSPITAL ENCOUNTER (INPATIENT)
Facility: MEDICAL CENTER | Age: 88
LOS: 1 days | DRG: 951 | End: 2023-05-17
Attending: STUDENT IN AN ORGANIZED HEALTH CARE EDUCATION/TRAINING PROGRAM | Admitting: STUDENT IN AN ORGANIZED HEALTH CARE EDUCATION/TRAINING PROGRAM
Payer: COMMERCIAL

## 2023-01-01 ENCOUNTER — PATIENT MESSAGE (OUTPATIENT)
Dept: MEDICAL GROUP | Facility: PHYSICIAN GROUP | Age: 88
End: 2023-01-01
Payer: MEDICARE

## 2023-01-01 ENCOUNTER — APPOINTMENT (OUTPATIENT)
Dept: RADIOLOGY | Facility: MEDICAL CENTER | Age: 88
DRG: 640 | End: 2023-01-01
Attending: INTERNAL MEDICINE
Payer: MEDICARE

## 2023-01-01 VITALS
WEIGHT: 85.98 LBS | HEART RATE: 90 BPM | HEIGHT: 62 IN | OXYGEN SATURATION: 94 % | TEMPERATURE: 97.1 F | RESPIRATION RATE: 20 BRPM | SYSTOLIC BLOOD PRESSURE: 172 MMHG | BODY MASS INDEX: 15.82 KG/M2 | DIASTOLIC BLOOD PRESSURE: 115 MMHG

## 2023-01-01 VITALS
BODY MASS INDEX: 15.82 KG/M2 | HEIGHT: 62 IN | WEIGHT: 85.98 LBS | DIASTOLIC BLOOD PRESSURE: 76 MMHG | OXYGEN SATURATION: 95 % | SYSTOLIC BLOOD PRESSURE: 110 MMHG | TEMPERATURE: 97.6 F | HEART RATE: 84 BPM

## 2023-01-01 VITALS — RESPIRATION RATE: 24 BRPM | DIASTOLIC BLOOD PRESSURE: 115 MMHG | SYSTOLIC BLOOD PRESSURE: 172 MMHG

## 2023-01-01 VITALS — BODY MASS INDEX: 15.73 KG/M2 | WEIGHT: 85.98 LBS

## 2023-01-01 DIAGNOSIS — E86.0 DEHYDRATION: ICD-10-CM

## 2023-01-01 DIAGNOSIS — G40.109 FOCAL EPILEPSY (HCC): ICD-10-CM

## 2023-01-01 DIAGNOSIS — F02.B0 MODERATE DEMENTIA ASSOCIATED WITH OTHER UNDERLYING DISEASE, WITHOUT BEHAVIORAL DISTURBANCE, PSYCHOTIC DISTURBANCE, MOOD DISTURBANCE, OR ANXIETY (HCC): ICD-10-CM

## 2023-01-01 DIAGNOSIS — R78.89: ICD-10-CM

## 2023-01-01 DIAGNOSIS — R63.4 WEIGHT LOSS: ICD-10-CM

## 2023-01-01 DIAGNOSIS — D72.810 LYMPHOCYTOPENIA: ICD-10-CM

## 2023-01-01 DIAGNOSIS — N17.9 AKI (ACUTE KIDNEY INJURY) (HCC): ICD-10-CM

## 2023-01-01 DIAGNOSIS — R41.0 DELIRIUM: ICD-10-CM

## 2023-01-01 DIAGNOSIS — R53.81 DEBILITY: ICD-10-CM

## 2023-01-01 DIAGNOSIS — R63.0 POOR APPETITE: ICD-10-CM

## 2023-01-01 LAB
ALBUMIN SERPL BCP-MCNC: 3.4 G/DL (ref 3.2–4.9)
ALBUMIN SERPL BCP-MCNC: 3.4 G/DL (ref 3.2–4.9)
ALBUMIN SERPL BCP-MCNC: 4.4 G/DL (ref 3.2–4.9)
ALBUMIN SERPL BCP-MCNC: 4.7 G/DL (ref 3.2–4.9)
ALBUMIN/GLOB SERPL: 1.1 G/DL
ALBUMIN/GLOB SERPL: 1.2 G/DL
ALBUMIN/GLOB SERPL: 1.3 G/DL
ALBUMIN/GLOB SERPL: 1.3 G/DL
ALP SERPL-CCNC: 36 U/L (ref 30–99)
ALP SERPL-CCNC: 42 U/L (ref 30–99)
ALP SERPL-CCNC: 52 U/L (ref 30–99)
ALP SERPL-CCNC: 70 U/L (ref 30–99)
ALT SERPL-CCNC: 14 U/L (ref 2–50)
ALT SERPL-CCNC: 15 U/L (ref 2–50)
ALT SERPL-CCNC: 17 U/L (ref 2–50)
ALT SERPL-CCNC: 21 U/L (ref 2–50)
ANION GAP SERPL CALC-SCNC: 10 MMOL/L (ref 7–16)
ANION GAP SERPL CALC-SCNC: 13 MMOL/L (ref 7–16)
ANION GAP SERPL CALC-SCNC: 15 MMOL/L (ref 7–16)
ANION GAP SERPL CALC-SCNC: 16 MMOL/L (ref 7–16)
APPEARANCE UR: CLEAR
AST SERPL-CCNC: 23 U/L (ref 12–45)
AST SERPL-CCNC: 24 U/L (ref 12–45)
AST SERPL-CCNC: 25 U/L (ref 12–45)
AST SERPL-CCNC: 30 U/L (ref 12–45)
BACTERIA #/AREA URNS HPF: NEGATIVE /HPF
BACTERIA BLD CULT: NORMAL
BACTERIA BLD CULT: NORMAL
BACTERIA UR CULT: NORMAL
BASOPHILS # BLD AUTO: 0.3 % (ref 0–1.8)
BASOPHILS # BLD AUTO: 0.3 % (ref 0–1.8)
BASOPHILS # BLD AUTO: 0.6 % (ref 0–1.8)
BASOPHILS # BLD: 0.02 K/UL (ref 0–0.12)
BASOPHILS # BLD: 0.02 K/UL (ref 0–0.12)
BASOPHILS # BLD: 0.03 K/UL (ref 0–0.12)
BILIRUB SERPL-MCNC: 0.5 MG/DL (ref 0.1–1.5)
BILIRUB SERPL-MCNC: 0.6 MG/DL (ref 0.1–1.5)
BILIRUB SERPL-MCNC: 0.6 MG/DL (ref 0.1–1.5)
BILIRUB SERPL-MCNC: 0.8 MG/DL (ref 0.1–1.5)
BILIRUB UR QL STRIP.AUTO: NEGATIVE
BUN SERPL-MCNC: 27 MG/DL (ref 8–22)
BUN SERPL-MCNC: 30 MG/DL (ref 8–22)
BUN SERPL-MCNC: 41 MG/DL (ref 8–22)
BUN SERPL-MCNC: 56 MG/DL (ref 8–22)
CALCIUM ALBUM COR SERPL-MCNC: 9.4 MG/DL (ref 8.5–10.5)
CALCIUM ALBUM COR SERPL-MCNC: 9.5 MG/DL (ref 8.5–10.5)
CALCIUM ALBUM COR SERPL-MCNC: 9.5 MG/DL (ref 8.5–10.5)
CALCIUM ALBUM COR SERPL-MCNC: 9.9 MG/DL (ref 8.5–10.5)
CALCIUM SERPL-MCNC: 10.5 MG/DL (ref 8.5–10.5)
CALCIUM SERPL-MCNC: 8.9 MG/DL (ref 8.5–10.5)
CALCIUM SERPL-MCNC: 9 MG/DL (ref 8.5–10.5)
CALCIUM SERPL-MCNC: 9.8 MG/DL (ref 8.5–10.5)
CHLORIDE SERPL-SCNC: 102 MMOL/L (ref 96–112)
CHLORIDE SERPL-SCNC: 106 MMOL/L (ref 96–112)
CHLORIDE SERPL-SCNC: 108 MMOL/L (ref 96–112)
CHLORIDE SERPL-SCNC: 115 MMOL/L (ref 96–112)
CO2 SERPL-SCNC: 19 MMOL/L (ref 20–33)
CO2 SERPL-SCNC: 25 MMOL/L (ref 20–33)
CO2 SERPL-SCNC: 26 MMOL/L (ref 20–33)
CO2 SERPL-SCNC: 26 MMOL/L (ref 20–33)
COLOR UR: YELLOW
CREAT SERPL-MCNC: 0.87 MG/DL (ref 0.5–1.4)
CREAT SERPL-MCNC: 0.97 MG/DL (ref 0.5–1.4)
CREAT SERPL-MCNC: 1.3 MG/DL (ref 0.5–1.4)
CREAT SERPL-MCNC: 1.45 MG/DL (ref 0.5–1.4)
EOSINOPHIL # BLD AUTO: 0.02 K/UL (ref 0–0.51)
EOSINOPHIL # BLD AUTO: 0.04 K/UL (ref 0–0.51)
EOSINOPHIL # BLD AUTO: 0.15 K/UL (ref 0–0.51)
EOSINOPHIL NFR BLD: 0.3 % (ref 0–6.9)
EOSINOPHIL NFR BLD: 0.7 % (ref 0–6.9)
EOSINOPHIL NFR BLD: 3.2 % (ref 0–6.9)
EPI CELLS #/AREA URNS HPF: NEGATIVE /HPF
ERYTHROCYTE [DISTWIDTH] IN BLOOD BY AUTOMATED COUNT: 41.4 FL (ref 35.9–50)
ERYTHROCYTE [DISTWIDTH] IN BLOOD BY AUTOMATED COUNT: 42.4 FL (ref 35.9–50)
ERYTHROCYTE [DISTWIDTH] IN BLOOD BY AUTOMATED COUNT: 42.6 FL (ref 35.9–50)
ERYTHROCYTE [DISTWIDTH] IN BLOOD BY AUTOMATED COUNT: 43.5 FL (ref 35.9–50)
FOLATE SERPL-MCNC: 7.8 NG/ML
GFR SERPLBLD CREATININE-BSD FMLA CKD-EPI: 34 ML/MIN/1.73 M 2
GFR SERPLBLD CREATININE-BSD FMLA CKD-EPI: 39 ML/MIN/1.73 M 2
GFR SERPLBLD CREATININE-BSD FMLA CKD-EPI: 55 ML/MIN/1.73 M 2
GFR SERPLBLD CREATININE-BSD FMLA CKD-EPI: 63 ML/MIN/1.73 M 2
GLOBULIN SER CALC-MCNC: 2.7 G/DL (ref 1.9–3.5)
GLOBULIN SER CALC-MCNC: 3.2 G/DL (ref 1.9–3.5)
GLOBULIN SER CALC-MCNC: 3.3 G/DL (ref 1.9–3.5)
GLOBULIN SER CALC-MCNC: 4 G/DL (ref 1.9–3.5)
GLUCOSE SERPL-MCNC: 112 MG/DL (ref 65–99)
GLUCOSE SERPL-MCNC: 155 MG/DL (ref 65–99)
GLUCOSE SERPL-MCNC: 71 MG/DL (ref 65–99)
GLUCOSE SERPL-MCNC: 93 MG/DL (ref 65–99)
GLUCOSE UR STRIP.AUTO-MCNC: NEGATIVE MG/DL
HCT VFR BLD AUTO: 43.4 % (ref 37–47)
HCT VFR BLD AUTO: 44.4 % (ref 37–47)
HCT VFR BLD AUTO: 44.8 % (ref 37–47)
HCT VFR BLD AUTO: 52.9 % (ref 37–47)
HGB BLD-MCNC: 13.7 G/DL (ref 12–16)
HGB BLD-MCNC: 14.4 G/DL (ref 12–16)
HGB BLD-MCNC: 14.4 G/DL (ref 12–16)
HGB BLD-MCNC: 17 G/DL (ref 12–16)
HYALINE CASTS #/AREA URNS LPF: NORMAL /LPF
IMM GRANULOCYTES # BLD AUTO: 0.01 K/UL (ref 0–0.11)
IMM GRANULOCYTES # BLD AUTO: 0.02 K/UL (ref 0–0.11)
IMM GRANULOCYTES # BLD AUTO: 0.03 K/UL (ref 0–0.11)
IMM GRANULOCYTES NFR BLD AUTO: 0.2 % (ref 0–0.9)
IMM GRANULOCYTES NFR BLD AUTO: 0.3 % (ref 0–0.9)
IMM GRANULOCYTES NFR BLD AUTO: 0.4 % (ref 0–0.9)
KETONES UR STRIP.AUTO-MCNC: ABNORMAL MG/DL
LACTATE SERPL-SCNC: 1.5 MMOL/L (ref 0.5–2)
LACTATE SERPL-SCNC: 2.2 MMOL/L (ref 0.5–2)
LEUKOCYTE ESTERASE UR QL STRIP.AUTO: NEGATIVE
LEVETIRACETAM SERPL-MCNC: 51 UG/ML (ref 10–40)
LYMPHOCYTES # BLD AUTO: 0.66 K/UL (ref 1–4.8)
LYMPHOCYTES # BLD AUTO: 0.8 K/UL (ref 1–4.8)
LYMPHOCYTES # BLD AUTO: 0.91 K/UL (ref 1–4.8)
LYMPHOCYTES NFR BLD: 15.1 % (ref 22–41)
LYMPHOCYTES NFR BLD: 16.8 % (ref 22–41)
LYMPHOCYTES NFR BLD: 8.6 % (ref 22–41)
MAGNESIUM SERPL-MCNC: 1.7 MG/DL (ref 1.5–2.5)
MCH RBC QN AUTO: 28 PG (ref 27–33)
MCH RBC QN AUTO: 28.3 PG (ref 27–33)
MCH RBC QN AUTO: 28.5 PG (ref 27–33)
MCH RBC QN AUTO: 28.9 PG (ref 27–33)
MCHC RBC AUTO-ENTMCNC: 31.6 G/DL (ref 33.6–35)
MCHC RBC AUTO-ENTMCNC: 32.1 G/DL (ref 33.6–35)
MCHC RBC AUTO-ENTMCNC: 32.1 G/DL (ref 33.6–35)
MCHC RBC AUTO-ENTMCNC: 32.4 G/DL (ref 33.6–35)
MCV RBC AUTO: 87.7 FL (ref 81.4–97.8)
MCV RBC AUTO: 88 FL (ref 81.4–97.8)
MCV RBC AUTO: 88.6 FL (ref 81.4–97.8)
MCV RBC AUTO: 90 FL (ref 81.4–97.8)
MICRO URNS: ABNORMAL
MONOCYTES # BLD AUTO: 0.51 K/UL (ref 0–0.85)
MONOCYTES # BLD AUTO: 0.53 K/UL (ref 0–0.85)
MONOCYTES # BLD AUTO: 0.59 K/UL (ref 0–0.85)
MONOCYTES NFR BLD AUTO: 11.1 % (ref 0–13.4)
MONOCYTES NFR BLD AUTO: 6.6 % (ref 0–13.4)
MONOCYTES NFR BLD AUTO: 9.8 % (ref 0–13.4)
NEUTROPHILS # BLD AUTO: 3.24 K/UL (ref 2–7.15)
NEUTROPHILS # BLD AUTO: 4.44 K/UL (ref 2–7.15)
NEUTROPHILS # BLD AUTO: 6.47 K/UL (ref 2–7.15)
NEUTROPHILS NFR BLD: 68.1 % (ref 44–72)
NEUTROPHILS NFR BLD: 73.8 % (ref 44–72)
NEUTROPHILS NFR BLD: 83.8 % (ref 44–72)
NITRITE UR QL STRIP.AUTO: NEGATIVE
NRBC # BLD AUTO: 0 K/UL
NRBC BLD-RTO: 0 /100 WBC
PH UR STRIP.AUTO: 5.5 [PH] (ref 5–8)
PLATELET # BLD AUTO: 168 K/UL (ref 164–446)
PLATELET # BLD AUTO: 193 K/UL (ref 164–446)
PLATELET # BLD AUTO: 233 K/UL (ref 164–446)
PLATELET # BLD AUTO: 260 K/UL (ref 164–446)
PMV BLD AUTO: 10.2 FL (ref 9–12.9)
PMV BLD AUTO: 9.6 FL (ref 9–12.9)
PMV BLD AUTO: 9.8 FL (ref 9–12.9)
PMV BLD AUTO: 9.9 FL (ref 9–12.9)
POTASSIUM SERPL-SCNC: 3.8 MMOL/L (ref 3.6–5.5)
POTASSIUM SERPL-SCNC: 4 MMOL/L (ref 3.6–5.5)
POTASSIUM SERPL-SCNC: 4.2 MMOL/L (ref 3.6–5.5)
POTASSIUM SERPL-SCNC: 4.3 MMOL/L (ref 3.6–5.5)
PROT SERPL-MCNC: 6.1 G/DL (ref 6–8.2)
PROT SERPL-MCNC: 6.6 G/DL (ref 6–8.2)
PROT SERPL-MCNC: 7.7 G/DL (ref 6–8.2)
PROT SERPL-MCNC: 8.7 G/DL (ref 6–8.2)
PROT UR QL STRIP: 300 MG/DL
RBC # BLD AUTO: 4.9 M/UL (ref 4.2–5.4)
RBC # BLD AUTO: 4.98 M/UL (ref 4.2–5.4)
RBC # BLD AUTO: 5.06 M/UL (ref 4.2–5.4)
RBC # BLD AUTO: 6.01 M/UL (ref 4.2–5.4)
RBC # URNS HPF: NORMAL /HPF
RBC UR QL AUTO: ABNORMAL
SIGNIFICANT IND 70042: NORMAL
SITE SITE: NORMAL
SODIUM SERPL-SCNC: 141 MMOL/L (ref 135–145)
SODIUM SERPL-SCNC: 141 MMOL/L (ref 135–145)
SODIUM SERPL-SCNC: 142 MMOL/L (ref 135–145)
SODIUM SERPL-SCNC: 144 MMOL/L (ref 135–145)
SODIUM SERPL-SCNC: 148 MMOL/L (ref 135–145)
SODIUM SERPL-SCNC: 150 MMOL/L (ref 135–145)
SOURCE SOURCE: NORMAL
SP GR UR STRIP.AUTO: 1.02
UROBILINOGEN UR STRIP.AUTO-MCNC: 0.2 MG/DL
VIT B1 BLD-MCNC: 105 NMOL/L (ref 70–180)
VIT B12 SERPL-MCNC: 323 PG/ML (ref 211–911)
VIT B6 SERPL-MCNC: 77.5 NMOL/L (ref 20–125)
WBC # BLD AUTO: 4.8 K/UL (ref 4.8–10.8)
WBC # BLD AUTO: 6 K/UL (ref 4.8–10.8)
WBC # BLD AUTO: 6.3 K/UL (ref 4.8–10.8)
WBC # BLD AUTO: 7.7 K/UL (ref 4.8–10.8)
WBC #/AREA URNS HPF: NORMAL /HPF

## 2023-01-01 PROCEDURE — 80053 COMPREHEN METABOLIC PANEL: CPT

## 2023-01-01 PROCEDURE — 770001 HCHG ROOM/CARE - MED/SURG/GYN PRIV*

## 2023-01-01 PROCEDURE — 770006 HCHG ROOM/CARE - MED/SURG/GYN SEMI*

## 2023-01-01 PROCEDURE — 700111 HCHG RX REV CODE 636 W/ 250 OVERRIDE (IP): Performed by: HOSPITALIST

## 2023-01-01 PROCEDURE — 99215 OFFICE O/P EST HI 40 MIN: CPT | Performed by: PSYCHIATRY & NEUROLOGY

## 2023-01-01 PROCEDURE — 51702 INSERT TEMP BLADDER CATH: CPT

## 2023-01-01 PROCEDURE — 82746 ASSAY OF FOLIC ACID SERUM: CPT

## 2023-01-01 PROCEDURE — 700102 HCHG RX REV CODE 250 W/ 637 OVERRIDE(OP): Performed by: INTERNAL MEDICINE

## 2023-01-01 PROCEDURE — 87086 URINE CULTURE/COLONY COUNT: CPT

## 2023-01-01 PROCEDURE — 700105 HCHG RX REV CODE 258: Performed by: INTERNAL MEDICINE

## 2023-01-01 PROCEDURE — 71045 X-RAY EXAM CHEST 1 VIEW: CPT

## 2023-01-01 PROCEDURE — 96375 TX/PRO/DX INJ NEW DRUG ADDON: CPT

## 2023-01-01 PROCEDURE — 97162 PT EVAL MOD COMPLEX 30 MIN: CPT

## 2023-01-01 PROCEDURE — 700111 HCHG RX REV CODE 636 W/ 250 OVERRIDE (IP): Performed by: STUDENT IN AN ORGANIZED HEALTH CARE EDUCATION/TRAINING PROGRAM

## 2023-01-01 PROCEDURE — T2045 HOSPICE GENERAL CARE: HCPCS

## 2023-01-01 PROCEDURE — 36415 COLL VENOUS BLD VENIPUNCTURE: CPT

## 2023-01-01 PROCEDURE — 99233 SBSQ HOSP IP/OBS HIGH 50: CPT | Performed by: INTERNAL MEDICINE

## 2023-01-01 PROCEDURE — 700111 HCHG RX REV CODE 636 W/ 250 OVERRIDE (IP): Performed by: INTERNAL MEDICINE

## 2023-01-01 PROCEDURE — 700105 HCHG RX REV CODE 258: Performed by: HOSPITALIST

## 2023-01-01 PROCEDURE — A9270 NON-COVERED ITEM OR SERVICE: HCPCS | Performed by: INTERNAL MEDICINE

## 2023-01-01 PROCEDURE — 700105 HCHG RX REV CODE 258: Performed by: EMERGENCY MEDICINE

## 2023-01-01 PROCEDURE — 99231 SBSQ HOSP IP/OBS SF/LOW 25: CPT | Performed by: INTERNAL MEDICINE

## 2023-01-01 PROCEDURE — 700102 HCHG RX REV CODE 250 W/ 637 OVERRIDE(OP): Performed by: HOSPITALIST

## 2023-01-01 PROCEDURE — 70551 MRI BRAIN STEM W/O DYE: CPT

## 2023-01-01 PROCEDURE — 99497 ADVNCD CARE PLAN 30 MIN: CPT | Performed by: INTERNAL MEDICINE

## 2023-01-01 PROCEDURE — 85025 COMPLETE CBC W/AUTO DIFF WBC: CPT

## 2023-01-01 PROCEDURE — 92610 EVALUATE SWALLOWING FUNCTION: CPT

## 2023-01-01 PROCEDURE — 700102 HCHG RX REV CODE 250 W/ 637 OVERRIDE(OP): Performed by: RADIOLOGY

## 2023-01-01 PROCEDURE — 99285 EMERGENCY DEPT VISIT HI MDM: CPT

## 2023-01-01 PROCEDURE — 83605 ASSAY OF LACTIC ACID: CPT | Mod: 91

## 2023-01-01 PROCEDURE — 303105 HCHG CATHETER EXTRA

## 2023-01-01 PROCEDURE — 85027 COMPLETE CBC AUTOMATED: CPT

## 2023-01-01 PROCEDURE — 80177 DRUG SCRN QUAN LEVETIRACETAM: CPT

## 2023-01-01 PROCEDURE — A9270 NON-COVERED ITEM OR SERVICE: HCPCS | Performed by: HOSPITALIST

## 2023-01-01 PROCEDURE — 70450 CT HEAD/BRAIN W/O DYE: CPT

## 2023-01-01 PROCEDURE — 76775 US EXAM ABDO BACK WALL LIM: CPT

## 2023-01-01 PROCEDURE — 87040 BLOOD CULTURE FOR BACTERIA: CPT

## 2023-01-01 PROCEDURE — 84425 ASSAY OF VITAMIN B-1: CPT

## 2023-01-01 PROCEDURE — 84295 ASSAY OF SERUM SODIUM: CPT

## 2023-01-01 PROCEDURE — 84207 ASSAY OF VITAMIN B-6: CPT

## 2023-01-01 PROCEDURE — 81001 URINALYSIS AUTO W/SCOPE: CPT

## 2023-01-01 PROCEDURE — 665036 HSPC NOTICE OF ELECTION NOE

## 2023-01-01 PROCEDURE — 99223 1ST HOSP IP/OBS HIGH 75: CPT | Mod: AI | Performed by: HOSPITALIST

## 2023-01-01 PROCEDURE — 96365 THER/PROPH/DIAG IV INF INIT: CPT

## 2023-01-01 PROCEDURE — 97166 OT EVAL MOD COMPLEX 45 MIN: CPT

## 2023-01-01 PROCEDURE — A9270 NON-COVERED ITEM OR SERVICE: HCPCS | Performed by: RADIOLOGY

## 2023-01-01 PROCEDURE — 82607 VITAMIN B-12: CPT

## 2023-01-01 PROCEDURE — 99239 HOSP IP/OBS DSCHRG MGMT >30: CPT | Performed by: INTERNAL MEDICINE

## 2023-01-01 PROCEDURE — 99211 OFF/OP EST MAY X REQ PHY/QHP: CPT | Performed by: PSYCHIATRY & NEUROLOGY

## 2023-01-01 PROCEDURE — 83735 ASSAY OF MAGNESIUM: CPT

## 2023-01-01 PROCEDURE — 99233 SBSQ HOSP IP/OBS HIGH 50: CPT | Mod: 25 | Performed by: INTERNAL MEDICINE

## 2023-01-01 PROCEDURE — G0299 HHS/HOSPICE OF RN EA 15 MIN: HCPCS

## 2023-01-01 RX ORDER — MORPHINE SULFATE 100 MG/5ML
10 SOLUTION ORAL
Status: DISCONTINUED | OUTPATIENT
Start: 2023-01-01 | End: 2023-01-01 | Stop reason: HOSPADM

## 2023-01-01 RX ORDER — ONDANSETRON 2 MG/ML
4 INJECTION INTRAMUSCULAR; INTRAVENOUS EVERY 4 HOURS PRN
Status: DISCONTINUED | OUTPATIENT
Start: 2023-01-01 | End: 2023-01-01 | Stop reason: HOSPADM

## 2023-01-01 RX ORDER — SODIUM CHLORIDE, SODIUM LACTATE, POTASSIUM CHLORIDE, CALCIUM CHLORIDE 600; 310; 30; 20 MG/100ML; MG/100ML; MG/100ML; MG/100ML
INJECTION, SOLUTION INTRAVENOUS CONTINUOUS
Status: DISCONTINUED | OUTPATIENT
Start: 2023-01-01 | End: 2023-01-01

## 2023-01-01 RX ORDER — DIAZEPAM 5 MG/1
2.5 TABLET ORAL
Status: COMPLETED | OUTPATIENT
Start: 2023-01-01 | End: 2023-01-01

## 2023-01-01 RX ORDER — ATROPINE SULFATE 10 MG/ML
2 SOLUTION/ DROPS OPHTHALMIC EVERY 4 HOURS PRN
Status: DISCONTINUED | OUTPATIENT
Start: 2023-01-01 | End: 2023-01-01 | Stop reason: HOSPADM

## 2023-01-01 RX ORDER — LEVETIRACETAM 500 MG/1
500 TABLET ORAL DAILY
Status: DISCONTINUED | OUTPATIENT
Start: 2023-01-01 | End: 2023-01-01

## 2023-01-01 RX ORDER — DONEPEZIL HYDROCHLORIDE 5 MG/1
10 TABLET, FILM COATED ORAL NIGHTLY
Status: DISCONTINUED | OUTPATIENT
Start: 2023-01-01 | End: 2023-01-01

## 2023-01-01 RX ORDER — MIRTAZAPINE 15 MG/1
15 TABLET, FILM COATED ORAL
Status: DISCONTINUED | OUTPATIENT
Start: 2023-01-01 | End: 2023-01-01

## 2023-01-01 RX ORDER — ONDANSETRON 2 MG/ML
8 INJECTION INTRAMUSCULAR; INTRAVENOUS EVERY 8 HOURS PRN
Status: DISCONTINUED | OUTPATIENT
Start: 2023-01-01 | End: 2023-05-18 | Stop reason: HOSPADM

## 2023-01-01 RX ORDER — HEPARIN SODIUM 5000 [USP'U]/ML
5000 INJECTION, SOLUTION INTRAVENOUS; SUBCUTANEOUS EVERY 8 HOURS
Status: DISCONTINUED | OUTPATIENT
Start: 2023-01-01 | End: 2023-01-01

## 2023-01-01 RX ORDER — CARBOXYMETHYLCELLULOSE SODIUM 5 MG/ML
1 SOLUTION/ DROPS OPHTHALMIC PRN
Status: DISCONTINUED | OUTPATIENT
Start: 2023-01-01 | End: 2023-05-18 | Stop reason: HOSPADM

## 2023-01-01 RX ORDER — SODIUM CHLORIDE 9 MG/ML
1000 INJECTION, SOLUTION INTRAVENOUS ONCE
Status: COMPLETED | OUTPATIENT
Start: 2023-01-01 | End: 2023-01-01

## 2023-01-01 RX ORDER — ACETAMINOPHEN 325 MG/1
650 TABLET ORAL EVERY 4 HOURS PRN
Status: DISCONTINUED | OUTPATIENT
Start: 2023-01-01 | End: 2023-05-18 | Stop reason: HOSPADM

## 2023-01-01 RX ORDER — LORAZEPAM 2 MG/ML
2 INJECTION INTRAMUSCULAR
Status: DISCONTINUED | OUTPATIENT
Start: 2023-01-01 | End: 2023-05-18 | Stop reason: HOSPADM

## 2023-01-01 RX ORDER — ENOXAPARIN SODIUM 100 MG/ML
40 INJECTION SUBCUTANEOUS DAILY
Status: DISCONTINUED | OUTPATIENT
Start: 2023-01-01 | End: 2023-01-01

## 2023-01-01 RX ORDER — BISACODYL 10 MG
10 SUPPOSITORY, RECTAL RECTAL
Status: DISCONTINUED | OUTPATIENT
Start: 2023-01-01 | End: 2023-05-18 | Stop reason: HOSPADM

## 2023-01-01 RX ORDER — MORPHINE SULFATE 4 MG/ML
2 INJECTION INTRAVENOUS
Status: DISCONTINUED | OUTPATIENT
Start: 2023-01-01 | End: 2023-01-01 | Stop reason: HOSPADM

## 2023-01-01 RX ORDER — ACETAMINOPHEN 325 MG/1
650 TABLET ORAL EVERY 6 HOURS PRN
Status: DISCONTINUED | OUTPATIENT
Start: 2023-01-01 | End: 2023-01-01 | Stop reason: HOSPADM

## 2023-01-01 RX ORDER — LEVETIRACETAM 500 MG/1
TABLET ORAL
Qty: 180 TABLET | Refills: 6 | Status: ON HOLD | OUTPATIENT
Start: 2023-01-01 | End: 2023-01-01

## 2023-01-01 RX ORDER — LORAZEPAM 2 MG/ML
1 INJECTION INTRAMUSCULAR EVERY 4 HOURS
Status: DISCONTINUED | OUTPATIENT
Start: 2023-01-01 | End: 2023-05-18 | Stop reason: HOSPADM

## 2023-01-01 RX ORDER — LEVETIRACETAM 500 MG/1
500 TABLET ORAL EVERY 12 HOURS
Status: DISCONTINUED | OUTPATIENT
Start: 2023-01-01 | End: 2023-01-01

## 2023-01-01 RX ORDER — LABETALOL HYDROCHLORIDE 5 MG/ML
10 INJECTION, SOLUTION INTRAVENOUS EVERY 4 HOURS PRN
Status: DISCONTINUED | OUTPATIENT
Start: 2023-01-01 | End: 2023-01-01

## 2023-01-01 RX ORDER — GLYCOPYRROLATE 0.2 MG/ML
0.2 INJECTION INTRAMUSCULAR; INTRAVENOUS 3 TIMES DAILY PRN
Status: DISCONTINUED | OUTPATIENT
Start: 2023-01-01 | End: 2023-05-18 | Stop reason: HOSPADM

## 2023-01-01 RX ORDER — ONDANSETRON 4 MG/1
4 TABLET, ORALLY DISINTEGRATING ORAL EVERY 4 HOURS PRN
Status: DISCONTINUED | OUTPATIENT
Start: 2023-01-01 | End: 2023-01-01 | Stop reason: HOSPADM

## 2023-01-01 RX ORDER — MIRTAZAPINE 15 MG/1
TABLET, FILM COATED ORAL
Qty: 90 TABLET | Refills: 3 | Status: ON HOLD | OUTPATIENT
Start: 2023-01-01 | End: 2023-01-01

## 2023-01-01 RX ORDER — ACETAMINOPHEN 650 MG/1
650 SUPPOSITORY RECTAL EVERY 4 HOURS PRN
Status: DISCONTINUED | OUTPATIENT
Start: 2023-01-01 | End: 2023-05-18 | Stop reason: HOSPADM

## 2023-01-01 RX ORDER — MEMANTINE HYDROCHLORIDE 10 MG/1
10 TABLET ORAL 2 TIMES DAILY
Status: DISCONTINUED | OUTPATIENT
Start: 2023-01-01 | End: 2023-01-01

## 2023-01-01 RX ORDER — DEXTROSE MONOHYDRATE 50 MG/ML
INJECTION, SOLUTION INTRAVENOUS CONTINUOUS
Status: DISCONTINUED | OUTPATIENT
Start: 2023-01-01 | End: 2023-01-01

## 2023-01-01 RX ORDER — LACTULOSE 20 G/30ML
10 SOLUTION ORAL
Status: DISCONTINUED | OUTPATIENT
Start: 2023-01-01 | End: 2023-05-18 | Stop reason: HOSPADM

## 2023-01-01 RX ADMIN — THIAMINE HYDROCHLORIDE 100 MG: 100 INJECTION, SOLUTION INTRAMUSCULAR; INTRAVENOUS at 05:17

## 2023-01-01 RX ADMIN — HEPARIN SODIUM 5000 UNITS: 5000 INJECTION, SOLUTION INTRAVENOUS; SUBCUTANEOUS at 14:00

## 2023-01-01 RX ADMIN — MORPHINE SULFATE 2 MG: 4 INJECTION INTRAVENOUS at 13:21

## 2023-01-01 RX ADMIN — MORPHINE SULFATE 10 MG: 100 SOLUTION ORAL at 08:09

## 2023-01-01 RX ADMIN — MEMANTINE HYDROCHLORIDE 10 MG: 10 TABLET ORAL at 05:17

## 2023-01-01 RX ADMIN — MIRTAZAPINE 15 MG: 15 TABLET, FILM COATED ORAL at 20:01

## 2023-01-01 RX ADMIN — HEPARIN SODIUM 5000 UNITS: 5000 INJECTION, SOLUTION INTRAVENOUS; SUBCUTANEOUS at 05:17

## 2023-01-01 RX ADMIN — MORPHINE SULFATE 2 MG: 4 INJECTION INTRAVENOUS at 10:08

## 2023-01-01 RX ADMIN — DEXTROSE MONOHYDRATE: 50 INJECTION, SOLUTION INTRAVENOUS at 18:42

## 2023-01-01 RX ADMIN — ONDANSETRON 4 MG: 2 INJECTION INTRAMUSCULAR; INTRAVENOUS at 15:46

## 2023-01-01 RX ADMIN — MORPHINE SULFATE 10 MG: 100 SOLUTION ORAL at 11:10

## 2023-01-01 RX ADMIN — LORAZEPAM 1 MG: 2 INJECTION INTRAMUSCULAR; INTRAVENOUS at 15:30

## 2023-01-01 RX ADMIN — SODIUM CHLORIDE 1000 ML: 9 INJECTION, SOLUTION INTRAVENOUS at 12:51

## 2023-01-01 RX ADMIN — DIAZEPAM 2.5 MG: 5 TABLET ORAL at 13:45

## 2023-01-01 RX ADMIN — MORPHINE SULFATE 10 MG: 100 SOLUTION ORAL at 14:29

## 2023-01-01 RX ADMIN — SODIUM CHLORIDE, POTASSIUM CHLORIDE, SODIUM LACTATE AND CALCIUM CHLORIDE: 600; 310; 30; 20 INJECTION, SOLUTION INTRAVENOUS at 08:14

## 2023-01-01 RX ADMIN — MORPHINE SULFATE 2 MG: 4 INJECTION INTRAVENOUS at 14:26

## 2023-01-01 RX ADMIN — THIAMINE HYDROCHLORIDE 100 MG: 100 INJECTION, SOLUTION INTRAMUSCULAR; INTRAVENOUS at 18:46

## 2023-01-01 RX ADMIN — CEFTRIAXONE SODIUM 1000 MG: 10 INJECTION, POWDER, FOR SOLUTION INTRAVENOUS at 18:41

## 2023-01-01 RX ADMIN — SODIUM CHLORIDE 1000 ML: 9 INJECTION, SOLUTION INTRAVENOUS at 16:48

## 2023-01-01 RX ADMIN — HEPARIN SODIUM 5000 UNITS: 5000 INJECTION, SOLUTION INTRAVENOUS; SUBCUTANEOUS at 21:55

## 2023-01-01 RX ADMIN — MORPHINE SULFATE 10 MG: 100 SOLUTION ORAL at 04:50

## 2023-01-01 RX ADMIN — HEPARIN SODIUM 5000 UNITS: 5000 INJECTION, SOLUTION INTRAVENOUS; SUBCUTANEOUS at 21:05

## 2023-01-01 RX ADMIN — MORPHINE SULFATE 2 MG: 4 INJECTION INTRAVENOUS at 15:21

## 2023-01-01 RX ADMIN — HEPARIN SODIUM 5000 UNITS: 5000 INJECTION, SOLUTION INTRAVENOUS; SUBCUTANEOUS at 06:45

## 2023-01-01 RX ADMIN — MORPHINE SULFATE 5 MG: 10 INJECTION INTRAVENOUS at 15:30

## 2023-01-01 RX ADMIN — SODIUM CHLORIDE, POTASSIUM CHLORIDE, SODIUM LACTATE AND CALCIUM CHLORIDE: 600; 310; 30; 20 INJECTION, SOLUTION INTRAVENOUS at 21:58

## 2023-01-01 RX ADMIN — MORPHINE SULFATE 10 MG: 100 SOLUTION ORAL at 12:55

## 2023-01-01 SDOH — ECONOMIC STABILITY: GENERAL

## 2023-01-01 ASSESSMENT — ACTIVITIES OF DAILY LIVING (ADL)
CONTINENCE_REQUIRES_ASSISTANCE: 1
PHYSICAL_TRANSFER_REQUIRES_ASSISTANCE: 1
TOILETING: REQUIRES ASSIST
CONTINENCE_REQUIRES_ASSISTANCE: 1
AMBULATION_REQUIRES_ASSISTANCE: 1
AMBULATION_REQUIRES_ASSISTANCE: 1
EATING_REQUIRES_ASSISTANCE: 1
AMBULATION ASSISTANCE: NON-AMBULATORY
MONEY MANAGEMENT (EXPENSES/BILLS): TOTALLY DEPENDENT
DRESSING_REQUIRES_ASSISTANCE: 1
BATHING_REQUIRES_ASSISTANCE: 1
PHYSICAL_TRANSFER_REQUIRES_ASSISTANCE: 1
EATING_REQUIRES_ASSISTANCE: 1
DRESSING_REQUIRES_ASSISTANCE: 1
BATHING_REQUIRES_ASSISTANCE: 1

## 2023-01-01 ASSESSMENT — COGNITIVE AND FUNCTIONAL STATUS - GENERAL
TOILETING: A LOT
STANDING UP FROM CHAIR USING ARMS: A LITTLE
PERSONAL GROOMING: A LOT
MOVING FROM LYING ON BACK TO SITTING ON SIDE OF FLAT BED: UNABLE
WALKING IN HOSPITAL ROOM: A LITTLE
SUGGESTED CMS G CODE MODIFIER DAILY ACTIVITY: CL
TURNING FROM BACK TO SIDE WHILE IN FLAT BAD: UNABLE
MOVING TO AND FROM BED TO CHAIR: UNABLE
DRESSING REGULAR UPPER BODY CLOTHING: A LOT
MOBILITY SCORE: 11
DRESSING REGULAR LOWER BODY CLOTHING: A LOT
SUGGESTED CMS G CODE MODIFIER MOBILITY: CL
EATING MEALS: A LITTLE
DAILY ACTIVITIY SCORE: 13
HELP NEEDED FOR BATHING: A LOT
CLIMB 3 TO 5 STEPS WITH RAILING: A LOT

## 2023-01-01 ASSESSMENT — ENCOUNTER SYMPTOMS
PERSON REPORTING PAIN: DIRECT OBSERVATION
BOWEL INCONTINENCE: 1
DYSPNEA ACTIVITY LEVEL: AT REST
SHORTNESS OF BREATH: 1
PAIN LOCATION: GENERALIZED
CHANGE IN LEVEL OF CONSCIOUSNESS: 1
PAIN LOCATION - PAIN SEVERITY: 7/10
SHORTNESS OF BREATH: 1
PAIN: 1
STOOL FREQUENCY: LESS THAN DAILY
CONSTIPATION: 1
PAIN LOCATION - RELIEVING FACTORS: MEDICATION
CHANGE IN LEVEL OF CONSCIOUSNESS: 1
DRY SKIN: 1

## 2023-01-01 ASSESSMENT — PATIENT HEALTH QUESTIONNAIRE - PHQ9
CLINICAL INTERPRETATION OF PHQ2 SCORE: 0
1. LITTLE INTEREST OR PLEASURE IN DOING THINGS: NOT AT ALL
CLINICAL INTERPRETATION OF PHQ2 SCORE: 0
SUM OF ALL RESPONSES TO PHQ9 QUESTIONS 1 AND 2: 0

## 2023-01-01 ASSESSMENT — PAIN DESCRIPTION - PAIN TYPE
TYPE: ACUTE PAIN
TYPE: ACUTE PAIN
TYPE: ACUTE PAIN;CHRONIC PAIN
TYPE: ACUTE PAIN
TYPE: OTHER (COMMENT)
TYPE: ACUTE PAIN;CHRONIC PAIN
TYPE: ACUTE PAIN
TYPE: ACUTE PAIN;CHRONIC PAIN

## 2023-01-01 ASSESSMENT — PAIN SCALES - PAIN ASSESSMENT IN ADVANCED DEMENTIA (PAINAD)
BODYLANGUAGE: 1 - TENSE. DISTRESSED PACING. FIDGETING.
FACIALEXPRESSION: 2 - FACIAL GRIMACING.
NEGVOCALIZATION: 1 - OCCASIONAL MOAN OR GROAN. LOW-LEVEL SPEECH WITH A NEGATIVE OR DISAPPROVING QUALITY.
CONSOLABILITY: 2 - UNABLE TO CONSOLE, DISTRACT OR REASSURE.
TOTALSCORE: 7

## 2023-01-01 ASSESSMENT — COPD QUESTIONNAIRES
HAVE YOU SMOKED AT LEAST 100 CIGARETTES IN YOUR ENTIRE LIFE: YES
DO YOU EVER COUGH UP ANY MUCUS OR PHLEGM?: NO/ONLY WITH OCCASIONAL COLDS OR INFECTIONS
DURING THE PAST 4 WEEKS HOW MUCH DID YOU FEEL SHORT OF BREATH: NONE/LITTLE OF THE TIME
COPD SCREENING SCORE: 4

## 2023-01-01 ASSESSMENT — MONTREAL COGNITIVE ASSESSMENT (MOCA)
3. DRAW A CLOCK: CONTOUR, NUMBERS, HANDS: 1/3
6. READ LIST OF DIGITS [FORWARD/BACKWARD]: 1/2
10. [FLUENCY] NAME WORDS STARTING WITH DESIGNATED LETTER: 0/1
4. NAME EACH OF THE THREE ANIMALS SHOWN: 0/3
7. [VIGILENCE] TAP WHEN HEARING DESIGNATED LETTER: 1/1
8. SERIAL SUBTRACTION OF 7S: 0/5
1. ALTERNATING TRAIL MAKING: 0/1
9. REPEAT EACH SENTENCE: 0/2
5. MEMORY TRIALS: SECOND TRIAL
WHAT IS THE VERSION OF MOCA ADMINISTERED: 8.1
2. COPY DRAWING: 0/1
ORIENTATION SUBSCORE: 1/6
WHAT IS THE TOTAL SCORE (OUT OF 30): 4
11. FOR EACH PAIR OF WORDS, WHAT CATEGORY DO THEY BELONG TO (OUT OF 2): 0/2
DELAYED RECALL SUBSCORE: 0/5

## 2023-01-01 ASSESSMENT — FIBROSIS 4 INDEX
FIB4 SCORE: 3.05
FIB4 SCORE: 2.58

## 2023-01-20 NOTE — PROGRESS NOTES
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 thoracic aorta.  No gross mediastinal mass or adenopathy.  Nodule at the medial LEFT lung apex is unchanged, measuring 4 mm.  Nodule located more centrally measuring 5 mm also unchanged.  Calcified nodule at the anterior RIGHT upper lobe.  Small subpleural nodules again seen in the posterior RIGHT lower lobe,   stable.  Calcified plaque again present in the posterior medial RIGHT lower lobe.  Ill-defined parenchymal opacity in the posterior medial RIGHT lower lobe toward the base measuring approximately 7 x 13 mm, overall stable.  Minimal pleural thickening of the LEFT major fissure again noted, unchanged.  No pleural fluid collection or pneumothorax.  Calcified pleural nodules present in the RIGHT mid chest anteriorly.  Degenerative change of thoracic spine.  Low-density lesion in the visualized RIGHT lobe liver, unchanged.  Colonic diverticula noted.   Impression       1.  Stable pulmonary nodules.  2.  Ill-defined parenchymal opacity in the posterior medial RIGHT lower lobe is unchanged from prior exam, likely scarring.  Continued follow-up recommended.    Low Risk: No routine follow-up    High Risk: Optional CT at 12 months    Comments: Use most suspicious nodule as guide to management. Follow-up intervals may vary  according to size and risk.    Low Risk - Minimal or absent history of smoking and of other known risk factors.    High Risk - History of smoking or of other known risk factors.     Constipation, unspecified constipation type  S/p colo 2017 that was fine  Controlled with OTCs    H/o melanoma    Diverticulosis of intestine without bleeding, unspecified intestinal tract location  Incidental finding     Iron deficiency anemia, unspecified iron deficiency anemia type  No gross bleeding  hgb stable and now back to normal   CBC fine off iron     Microscopic hematuria  Low grade and present for some time  Could be 2/2 CKD, atrophic vaginitis  Explained to pt is could also be a marker of malignancy  Declines further eval (e.g., CT urogram and uro eval for cystoscopy)  Resolved in March and Sept 2018    Dry cough  In last few years  CXR done June 2017 ago was fine  CT - see above  Denies GERD although cough could be only sx of GERD  Suspect allergies could be contributing - rec Claritin, Flonase and NS  Not an issue today     Leukopenia, unspecified type  Low grade for many years  No localizing s/sx except for WL that she does not want to pursue  Watch for now    Bereavement  Loss of DTR early 2017    Osteopenia with high risk of fracture  5 and 15% Oct 2016  Can't take bisphos given gfr < 35  Not interested in injections for OP  Ca in diet  Vitamin D supplementation   Consider repeat dexa 2 years - if markedly worse, then, re-address  Does not want to repeat scan    Hyperlipidemia, unspecified hyperlipidemia type  LDL high - can't calc ASCVD given risk   Doesn't want meds    Preventative health care  Flu shot 2018  Rec TDAP  Prevnar 2017  UTD with Zostavax and Pneumovax per Dr. CANDELARIA's note  Needs Shingrix  Hasn't been doing mammos for years - declines  [place]etta done - see above  Pap not indicated  DEXA 2016 - see above  Sees the eye doctor    Patient Instructions   Stretch legs prior to going to bed.     I recommend the Shingrix  vaccine series to prevent shingles.  Please get the vaccines at a local pharmacy.        Make sure you eat.     Come back in 3-4 months or sooner if needed.      Let know names of eye drops and over the counter leg cramp spray.    Follow-up  Return in about 3 months (around 2/12/2019).    Signed by: Anabella Sanchez M.D.       Unknown

## 2023-03-22 NOTE — TELEPHONE ENCOUNTER
Received request via: Pharmacy    Was the patient seen in the last year in this department? Yes    Does the patient have an active prescription (recently filled or refills available) for medication(s) requested? No    Does the patient have FDC Plus and need 100 day supply (blood pressure, diabetes and cholesterol meds only)? Medication is not for cholesterol, blood pressure or diabetes.

## 2023-04-11 PROBLEM — G40.109 FOCAL EPILEPSY (HCC): Status: ACTIVE | Noted: 2023-01-01

## 2023-04-11 PROBLEM — F03.90 DEMENTIA (HCC): Status: ACTIVE | Noted: 2023-01-01
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
## 2023-04-12 ENCOUNTER — APPOINTMENT (RX ONLY)
Dept: URBAN - METROPOLITAN AREA CLINIC 6 | Facility: CLINIC | Age: 88
Setting detail: DERMATOLOGY
End: 2023-04-12

## 2023-04-12 DIAGNOSIS — Z86.007 PERSONAL HISTORY OF IN-SITU NEOPLASM OF SKIN: ICD-10-CM

## 2023-04-12 DIAGNOSIS — Z71.89 OTHER SPECIFIED COUNSELING: ICD-10-CM

## 2023-04-12 PROBLEM — D04.39 CARCINOMA IN SITU OF SKIN OF OTHER PARTS OF FACE: Status: ACTIVE | Noted: 2023-04-12

## 2023-04-12 PROBLEM — D48.5 NEOPLASM OF UNCERTAIN BEHAVIOR OF SKIN: Status: ACTIVE | Noted: 2023-04-12

## 2023-04-12 PROCEDURE — 99213 OFFICE O/P EST LOW 20 MIN: CPT | Mod: 25

## 2023-04-12 PROCEDURE — 11102 TANGNTL BX SKIN SINGLE LES: CPT

## 2023-04-12 PROCEDURE — ? SUNSCREEN TREATMENT REGIMEN

## 2023-04-12 PROCEDURE — ? ADDITIONAL NOTES

## 2023-04-12 PROCEDURE — ? COUNSELING

## 2023-04-12 PROCEDURE — ? BIOPSY BY SHAVE METHOD AND DESTRUCTION

## 2023-04-12 ASSESSMENT — LOCATION SIMPLE DESCRIPTION DERM: LOCATION SIMPLE: RIGHT TEMPLE

## 2023-04-12 ASSESSMENT — LOCATION ZONE DERM: LOCATION ZONE: FACE

## 2023-04-12 ASSESSMENT — LOCATION DETAILED DESCRIPTION DERM: LOCATION DETAILED: RIGHT LATERAL TEMPLE

## 2023-04-12 NOTE — PROCEDURE: BIOPSY BY SHAVE METHOD AND DESTRUCTION
Detail Level: Detailed
Biopsy Type: H and E
Bill As?: Biopsy by Shave Method
Size Of Lesion In Cm (Optional): 0
Size Of Lesion After Curettage: 1.5
Anesthesia Type: 1% lidocaine without epinephrine and a 1:10 solution of 8.4% sodium bicarbonate
Anesthesia Volume In Cc: 2
Hemostasis: Electrocautery
Destruction Type: curettage
Number Of Curettages: 5
Wound Care: Petrolatum
Lab: [id number]
Render Path Notes In Note?: No
Consent: Written consent was obtained and risks were reviewed including but not limited to scarring, infection, bleeding, scabbing, incomplete removal, nerve damage and allergy to anesthesia.
Post-Care Instructions: I reviewed with the patient in detail post-care instructions. Patient is to keep the biopsy site dry overnight, and then apply bacitracin twice daily until healed. Patient may apply hydrogen peroxide soaks to remove any crusting.
Notification Instructions: Patient will be notified of biopsy results. However, patient instructed to call the office if not contacted within 2 weeks.
Billing Type: Third-Party Bill

## 2023-04-12 NOTE — PROCEDURE: ADDITIONAL NOTES
Additional Notes: 4/12/23: Son and Tatianna decline any further treatment at this site at this time. They understand the potential risk of incomplete treatment. \\n\\nPrevious: Pathology reiterated. Family and patient previously declined any surgical intervention and understand the potential risk for incomplete treatment. Discussed cryosurgery. Tatianna is in clinic today with a family friend Jesus. Jesus was able to get Tatianna Stern's POA, on the phone. Treatment options reiterated. Jarred elects and consents to LN2 today.
Render Risk Assessment In Note?: no
Detail Level: Simple
Additional Notes: Potential for NMSC discussed. Discussed treatment options, son agrees with plan for biopsy and destruction. Will follow up in 6 weeks.

## 2023-04-21 PROBLEM — D72.810 LYMPHOCYTOPENIA: Status: ACTIVE | Noted: 2023-01-01

## 2023-05-02 NOTE — DISCHARGE INSTRUCTIONS
Diazepam (VALIUM) Oral solution  What is this medicine?  You were prescribed DIAZEPAM (dye [place] e jorge) for the procedure you had today. This medication is a benzodiazepine. It is used to treat anxiety and nervousness. It also can help treat alcohol withdrawal, relax muscles, and treat certain types of seizures.  This medicine may be used for other purposes; ask your health care provider or pharmacist if you have questions.  What side effects may I notice from receiving this medicine?  Side effects that you should report to your doctor or health care professional as soon as possible:  allergic reactions like skin rash, itching or hives, swelling of the face, lips, or tongue  angry, confused, depressed, other mood changes  breathing problems  feeling faint or lightheaded, falls  muscle cramps  problems with balance, talking, walking  restlessness  tremors  trouble passing urine or change in the amount of urine  unusually weak or tired  Side effects that usually do not require medical attention (report to your doctor or health care professional if they continue or are bothersome):  difficulty sleeping, nightmares  dizziness, drowsiness, clumsiness, or unsteadiness, a hangover effect  headache  nausea, vomiting  This list may not describe all possible side effects. Call your doctor for medical advice about side effects. You may report side effects to FDA at 7-406-FDA-6590.

## 2023-05-09 NOTE — PROGRESS NOTES
Mr. Jeremy Orozco was contacted by telephone on 5/8/2023 1915 hrs.    He reports his mother has had decreased LOC since starting her new medications prescribed a neurology.  He has not contacted the neurologist.    She is eating and drinking adequately.    I have asked him to contact neurology tomorrow as I do not have any of the records and on unable to make sense of the report I am receiving from the Ernesto family.  We will see if neurology can evaluate her current medications and see if there is anything causative here.    Otherwise have asked Mr. Orozco to contact me tomorrow afternoon.  He is considering hospice care for Mrs. Henri Orozco.    Luis Antonio Ramirez PA-C

## 2023-05-10 PROBLEM — G93.40 ACUTE ENCEPHALOPATHY: Status: ACTIVE | Noted: 2023-01-01

## 2023-05-10 PROBLEM — N18.30 ACUTE RENAL FAILURE SUPERIMPOSED ON STAGE 3 CHRONIC KIDNEY DISEASE (HCC): Status: ACTIVE | Noted: 2023-01-01

## 2023-05-10 PROBLEM — E43 SEVERE PROTEIN-CALORIE MALNUTRITION (HCC): Status: ACTIVE | Noted: 2023-01-01

## 2023-05-10 PROBLEM — R53.81 DEBILITY: Status: ACTIVE | Noted: 2023-01-01

## 2023-05-10 PROBLEM — N17.9 ACUTE RENAL FAILURE SUPERIMPOSED ON STAGE 3 CHRONIC KIDNEY DISEASE (HCC): Status: ACTIVE | Noted: 2023-01-01

## 2023-05-10 NOTE — ED TRIAGE NOTES
"Chief Complaint   Patient presents with    Fall     Fell out of bed this morning. Possible seizure (+hx of this). Found on floor by bed by her son Cullen who presents with her to ED today.      Sent by MD     Son called patient's neurologist who recommended that patient be brought to ED.     ED Triage Vitals   Enc Vitals Group      Blood Pressure  05/10/23 1132 119/75      Pulse 05/10/23 1132 (!) 120      Respiration 05/10/23 1132 24 !      Temperature 05/10/23 1132 36.3 °C (97.4 °F)      Temp src 05/10/23 1132 Temporal      Pulse Oximetry 05/10/23 1132 91 %      Weight 05/10/23 1136 39 kg (85 lb 15.7 oz)      Height 05/10/23 1136 1.575 m (5' 2\")     Son also describes an acute decline over the past week. \"A week ago she was walking and talking.\"   Unknown if any injury occurred this morning from the fall. Patient is not eating.  When questioned by this RN, patient tries to speak but is unintelligible. Mucus membranes dry.  Patient taken directly to [facility] from triage, sepsis protocol ordered per standing orders.  "

## 2023-05-10 NOTE — ED NOTES
Pt wheel to room and lifted into bed. Son at bedside. Pt connected to monitor, IV placed, labs sent.
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
## 2023-05-11 PROBLEM — E87.0 HYPERNATREMIA: Status: ACTIVE | Noted: 2023-01-01

## 2023-05-11 NOTE — ASSESSMENT & PLAN NOTE
Likely prerenal  IV fluid hydration  Avoid IV contrast/nephrotoxins/NSAIDs  Dose adjust meds for decreased GFR  I reviewed ultrasound renal did not show any obstructive uropathy but  Initiated comfort care measures on May 12, 2023

## 2023-05-11 NOTE — DISCHARGE PLANNING
[facility] chart review. Monitoring for developments in discharge disposition and will round back to obtain choice as indicated. Thank you.

## 2023-05-11 NOTE — DIETARY
"Nutrition services: Day 1 of admit.  Lela Orozco is a 89 y.o. female with admitting DX of acute encephalopathy.  Consult received for BMI<19 and MDs DX of severe protein calorie malnutrition.    Assessment:  Height: 157.5 cm (5' 2\")  Weight: 39 kg (85 lb 15.7 oz)- wt is estimated. Asked RN for new wt.   Body mass index is 15.73 kg/m²., BMI classification: Underweight  Diet/Intake: Regular    Evaluation:   PMH: Anemia of chronic disease, CKD, stage III, Dementia, GERD, skin cancer, osteoarthritis  Per RN note this morning, pt A&O x 1. Pt with dementia.  Labs: Sodium 150, BUN 41  Meds: Rocephin, Keppra (held), remeron, zofran PRN, thiamin (missed)  Skin: no p/u, no edema  +BM: 5/9  Per chart review, pt weighed 91lbs via stand up scale on 11/17/22. More recent wts do not have a known source. Pt currently estimated at 85lbs. Pt has lost ~6.6% of wt in 6 months (not significant per ASPEN criteria).   Per H&P, pt \"has been lethargic for the past 2 weeks and has not been eating well at home.\" Per ADLs, pt ate 0% of breakfast today and refused oral supplement.  Visited pt at bedside. Per nutrition focused physical exam, pt with severe muscle loss in the temporalis region, severe fat loss in orbital area, severe muscle loss in the clavicle region, and severe fat loss in upper arm area.  Talked to the NR, pt will be receiving more of a soft diet to help increase PO.     Malnutrition Risk: Per ASPEN criteria, pt with severe malnutrition in the context of acute illness r/t DX of acute encephalopathy aeb muscle loss and fat loss determined by NFPE.     Recommendations/Plans   Add Boost Plus BID  Encourage intake of 50%  Document intake of all meals and supplements as % taken in ADL's to provide interdisciplinary communication across all shifts.   Monitor weight.  Nutrition rep will continue to see patient for ongoing meal and snack preferences.     RD following.   "

## 2023-05-11 NOTE — PROGRESS NOTES
Pt was awake so I sat her up in bed and swabbed her mouth. She was asking for water so I got a teaspoon and put 1/2 teaspoon of apple juice in her mouth and she swallowed 3-4 times and coughed and coughed. I then gave her 1/2 teaspoon of gravy from her meatballs and she again started coughing as soon as she swallowed it. I did let the  Know and he ordered a swallow evaluation for her. Monitor for needs.
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
## 2023-05-11 NOTE — PROGRESS NOTES
4 Eyes Skin Assessment Completed by Ai RN and Dorcas RN.    Head WDL  Ears WDL  Nose WDL  Mouth WDL  Neck WDL  Breast/Chest WDL  Shoulder Blades WDL  Spine WDL  (R) Arm/Elbow/Hand WDL  (L) Arm/Elbow/Hand Redness  Abdomen WDL  Groin WDL  Scrotum/Coccyx/Buttocks WDL  (R) Leg WDL  (L) Leg Redness  (R) Heel/Foot/Toe WDL  (L) Heel/Foot/Toe Redness          Devices In Places David      Interventions In Place Pillows    Possible Skin Injury Yes    Pictures Uploaded Into Epic N/A  Wound Consult Placed N/A  RN Wound Prevention Protocol Ordered No

## 2023-05-11 NOTE — CARE PLAN
The patient is Watcher - Medium risk of patient condition declining or worsening    Shift Goals  Patient Goals: comfort    Progress made toward(s) clinical / shift goals:    Problem: Knowledge Deficit - Standard  Goal: Patient and family/care givers will demonstrate understanding of plan of care, disease process/condition, diagnostic tests and medications  Outcome: Progressing     Problem: Fall Risk  Goal: Patient will remain free from falls  Outcome: Progressing       Patient is not progressing towards the following goals:

## 2023-05-11 NOTE — ASSESSMENT & PLAN NOTE
Patient found with significant hypernatremia and her sodium level is trending up.  Ordered D5W.  Hypernatremia now resolved.  Discontinue lab draws as per patient family request.

## 2023-05-11 NOTE — THERAPY
"Physical Therapy   Initial Evaluation     Patient Name: Lela Orozco  Age:  89 y.o., Sex:  female  Medical Record #: [id number]  Today's Date: 5/11/2023     Precautions  Precautions: Fall Risk    Assessment  Patient is 89 y.o. female admitted with altered mental status and decline in function. Pt diagnosed with encephalopathy currently.  PMH significant for osteopenia, dementia, epilepsy.     Patient received in bed agreeable to PT evaluation. Patient presents with impaired cognition, speech, and functional mobility. Patient required max assistance for bed mobility. Patient able to sit EOB without physical assistance and demonstrated functional strength for sit <> stand. Per discussion with son, if patient does not improve, he is unsure if he will be able to care for her. Will continue to follow for acute PT to progress as able.    Plan    Physical Therapy Initial Treatment Plan   Treatment Plan : Bed Mobility, Gait Training, Neuro Re-Education / Balance, Self Care / Home Evaluation, Stair Training, Therapeutic Activities, Therapeutic Exercise  Treatment Frequency: 4 Times per Week  Duration: Until Therapy Goals Met    DC Equipment Recommendations: Unable to determine at this time  Discharge Recommendations: Recommend post-acute placement for additional physical therapy services prior to discharge home       Subjective    \"I am cold.\"     Objective       05/11/23 1041   Precautions   Precautions Fall Risk   Pain   Pain Scales 0 to 10 Scale    Pain 0 - 10 Group   Therapist Pain Assessment During Activity;Nurse Notified  (pt complaining of pain not specified)   Prior Living Situation   Prior Services Continuous (24 Hour) Care Giving Family;Intermittent Physical Support for ADL Per Family   Housing / Facility 1 Story House   Steps Into Home 2   Steps In Home 0   Bathroom Set up Walk In Shower   Equipment Owned None   Lives with - Patient's Self Care Capacity Adult Children   Comments Pt unable to provide " information. Discussed with son over the phone. Pt was supervised with mobility until ~1 week ago and would require prompting to complete tasks per son.   Prior Level of Functional Mobility   Comments Prior to recent decline, pt ambulatory with no AD and supervision. Most recently, was requiring assist to ambulate.   History of Falls   History of Falls Yes   Date of Last Fall   (Pt found on floor prior to admission)   Cognition    Cognition / Consciousness X   Speech/ Communication Delayed Responses;Incomprehensible Words   Level of Consciousness Alert   Ability To Follow Commands 1 Step  (with repetition)   Safety Awareness Impulsive   Comments Pt alert, but lethargic. Pt able to follow simple commands with delay at times and repetition of cues. Pt mildly impulsive, attempted to stand immediately upon sitting EOB. Pt spoke very softly, so unable to understand at times. Oriented to self.   Passive ROM Lower Body   Passive ROM Lower Body WDL   Active ROM Lower Body    Comments Moves LE against gravity   Strength Lower Body   Comments Generalized LE weakness, grossly 3 to 3+/5. No buckling in standing.   Sensation Lower Body   Comments Did not assess   Lower Body Muscle Tone   Lower Body Muscle Tone  WDL   Comments Low muscle mass, cachectic   Coordination Lower Body    Coordination Lower Body  WDL   Balance Assessment   Sitting Balance (Static) Fair   Sitting Balance (Dynamic) Fair -   Standing Balance (Static) Fair -   Standing Balance (Dynamic) Poor +   Weight Shift Sitting Fair   Weight Shift Standing Fair   Comments with HHA   Bed Mobility    Supine to Sit Maximal Assist   Sit to Supine Moderate Assist   Scooting Moderate Assist   Comments Poor initiation of movements   Gait Analysis   Comments Pt likely able to ambulate with assist, but requested to sit back down. Took side steps EOB.   Functional Mobility   Sit to Stand Minimal Assist   Bed, Chair, Wheelchair Transfer Refused   Comments HHA, STS with side steps    How much difficulty does the patient currently have...   Turning over in bed (including adjusting bedclothes, sheets and blankets)? 1   Sitting down on and standing up from a chair with arms (e.g., wheelchair, bedside commode, etc.) 1   Moving from lying on back to sitting on the side of the bed? 1   How much help from another person does the patient currently need...   Moving to and from a bed to a chair (including a wheelchair)? 3   Need to walk in a hospital room? 3   Climbing 3-5 steps with a railing? 2   6 clicks Mobility Score 11   Short Term Goals    Short Term Goal # 1 Pt will be able to perform bed mobility with supervision to improve function in 6 visits.   Short Term Goal # 2 Pt will be able to perform sit <>stand with LRAD and supervision in 6 visits to improve functional mobility.   Short Term Goal # 3 Pt will be able to ambulate 50 feet with LRAD and supervision to progress functional mobility.   Education Group   Education Provided Role of Physical Therapist   Role of Physical Therapist Patient Response Family;Acceptance;Explanation;Verbal Demonstration   Physical Therapy Initial Treatment Plan    Treatment Plan  Bed Mobility;Gait Training;Neuro Re-Education / Balance;Self Care / Home Evaluation;Stair Training;Therapeutic Activities;Therapeutic Exercise   Treatment Frequency 4 Times per Week   Duration Until Therapy Goals Met   Problem List    Problems Impaired Bed Mobility;Impaired Transfers;Safety Awareness Deficits / Cognition;Motor Planning / Sequencing   Anticipated Discharge Equipment and Recommendations   DC Equipment Recommendations Unable to determine at this time   Discharge Recommendations Recommend post-acute placement for additional physical therapy services prior to discharge home   Interdisciplinary Plan of Care Collaboration   IDT Collaboration with  Occupational Therapist;Nursing;Family / Caregiver   Patient Position at End of Therapy In Bed   Collaboration Comments RN updated. Son  not present, called on phone

## 2023-05-11 NOTE — THERAPY
Occupational Therapy   Initial Evaluation     Patient Name: Lela Orozco  Age:  89 y.o., Sex:  female  Medical Record #: [id number]  Today's Date: 5/11/2023     Precautions  Precautions: Fall Risk    Assessment  Patient is 89 y.o. female with a diagnosis of GLF out of bed, workup ongoing, possible seizure. Hx of dementia but was fairly independent at home with her son.  Additional factors influencing patient status / progress: weakness, fatigue, impaired balance, impaired cognition.      Plan    Occupational Therapy Initial Treatment Plan   Treatment Interventions: Self Care / Activities of Daily Living, Adaptive Equipment, Neuro Re-Education / Balance, Therapeutic Exercises, Therapeutic Activity  Treatment Frequency: 3 Times per Week  Duration: Until Therapy Goals Met    DC Equipment Recommendations: Unable to determine at this time  Discharge Recommendations: Recommend post-acute placement for additional occupational therapy services prior to discharge home        Objective       05/11/23 1022   Prior Living Situation   Prior Services Continuous (24 Hour) Care Giving Family   Housing / Facility [address]   Bathroom Set up Walk In Shower   Equipment Owned None   Lives with - Patient's Self Care Capacity Sibling   Comments PT called sonElias to gather PLOF/PLS. She was fairly independent at home until the last week or so. Walking w/o AD, and she would need cues to complete her ADLs.She had a difficult time being thorough with bathing and pericare.   Prior Level of ADL Function   Self Feeding Independent   Grooming / Hygiene Independent   Bathing Requires Assist   Dressing Requires Assist   Toileting Requires Assist   Prior Level of IADL Function   Prior Level Of Mobility Independent Without Device in Community;Independent Without Device in Home   History of Falls   History of Falls Yes   Precautions   Precautions Fall Risk   Pain 0 - 10 Group   Therapist Pain Assessment Nurse Notified;During Activity  (c/o  pain during bed mobility, did not specify where)   Cognition    Cognition / Consciousness X   Comments pleasant and cooperative, hx of dementia. able to follow simple directions w/ repeated cues and intiation of tasks.   Active ROM Upper Body   Active ROM Upper Body  X   Comments BUE grossly limited due to weakness; able to comb sides of hair but unable to reach back of head   Strength Upper Body   Upper Body Strength  X   Gross Strength Generalized Weakness, Equal Bilaterally.    Balance Assessment   Sitting Balance (Static) Fair -   Sitting Balance (Dynamic) Fair -   Standing Balance (Static) Fair -   Standing Balance (Dynamic) Poor +   Weight Shift Sitting Fair   Weight Shift Standing Fair   Comments w/ HHA   Bed Mobility    Supine to Sit Maximal Assist   Sit to Supine Moderate Assist   Scooting Moderate Assist   ADL Assessment   Grooming Seated;Moderate Assist  (brush hair, wash face)   Upper Body Dressing Maximal Assist  (don/doff gown)   Lower Body Dressing Maximal Assist  (don/doff socks)   How much help from another person does the patient currently need...   Putting on and taking off regular lower body clothing? 2   Bathing (including washing, rinsing, and drying)? 2   Toileting, which includes using a toilet, bedpan, or urinal? 2   Putting on and taking off regular upper body clothing? 2   Taking care of personal grooming such as brushing teeth? 2   Eating meals? 3   6 Clicks Daily Activity Score 13   Functional Mobility   Sit to Stand Minimal Assist   Bed, Chair, Wheelchair Transfer   (declined due to not feeling well)   Mobility EOB>STS>side step>BTB   Comments w/ HHA   Short Term Goals   Short Term Goal # 1 pt will demo ADL txfs with supv   Short Term Goal # 2 pt will dress LB with Kristy   Short Term Goal # 3 pt will dress UB with Kristy   Short Term Goal # 4 pt will demo seated grooming w/ setup

## 2023-05-11 NOTE — DISCHARGE PLANNING
PT informed CM that PT had to reach out to pt's son for baseline. PT was told that the son had recently moved in with pt and has witnessed a decline in pt being able to care for self. She has been admitted for encephalopathy and son made a statement to PT that if pt does not improve, he is considering Hospice and would entertain an informational meeting.   Will be assessing and monitoring for changes and work with son for dc plan,

## 2023-05-11 NOTE — PROGRESS NOTES
Assumed care of patient 0700. Received Report from [facility] nurse. Patient A&O 1, on RA, Resting quietly in bed with eyes closed. No s/sx pain or any distress noted at this time.. Call light within reach, belongings within reach, Fall precautions in place, and bed alarm is on and bed in lowest position. Patient does not have any other needs at this time.

## 2023-05-11 NOTE — ASSESSMENT & PLAN NOTE
Patient found to have acute encephalopathy.  She does not show signs of acute meningitis.  She is following commands and she is does not have neck rigidity.  Her white blood cell count is within normal limits UA and chest x-ray did not show signs of infection.  I discontinued IV antibiotic.  Her acute encephalopathy could be metabolic due to hypernatremia.  I discussed plan of care with bedside RN.  Hyponatremia now resolved.  Plan is to transition her to hospice.  I discussed plan of care with patient's family at the bedside.  Initiated comfort care measures on May 12, 2023  5/16 eval by in hospice pending

## 2023-05-11 NOTE — CARE PLAN
Problem: Knowledge Deficit - Standard  Goal: Patient and family/care givers will demonstrate understanding of plan of care, disease process/condition, diagnostic tests and medications  Outcome: Progressing     Problem: Fall Risk  Goal: Patient will remain free from falls  Outcome: Progressing   The patient is Stable - Low risk of patient condition declining or worsening    Shift Goals  Patient Goals: VILLA    Progress made toward(s) clinical / shift goals:  Bed alarm on, bed is locked and in lowest position, call bell within reach of patient, needs met at this time.    Patient is not progressing towards the following goals:

## 2023-05-11 NOTE — ASSESSMENT & PLAN NOTE
Dietary consult  IV thiamine  Monitor for refeeding syndrome  Patient found to have difficulty swallowing I placed order for speech therapy evaluation.  Initiated comfort care measures on May 12, 2023

## 2023-05-11 NOTE — PROGRESS NOTES
Hospital Medicine Daily Progress Note    Date of Service  5/11/2023    Chief Complaint  Lela Orozco is a 89 y.o. female admitted 5/10/2023 with altered mental status    Hospital Course    Lela Orozco is a 89 y.o. female who presented 5/10/2023 with past medical history of Alzheimer's dementia, seizure disorder who comes into the emergency room for altered mental status.  She was found on the ground by her son this morning.  Patient has been lethargic for the past 2 weeks and has not been eating well at home.  Patient is unable to answer my questions appropriately.  She was started on Keppra 500 twice daily 1 month prior.  During that time she did have an 24-hour EEG at her neurologist office that found intermittent independent bitemporal slowing.  No focal or generalized epileptic activity noted.  She found to hypernatremia I started her on D5W.    Interval Problem Update    05/11/23  I evaluated and examined her at the bedside.  She is following commands but unable to provide detailed information.  I attempted to call patient's son but phone directly went on a voicemail.  She found to have significant hyponatremia I started on D5W in order to 6 hours sodium checks.  There is some concern for swallowing difficulties and I placed order for speech therapy.    I have discussed this patient's plan of care and discharge plan at IDT rounds today with Case Management, Nursing, Nursing leadership, and other members of the IDT team.    Consultants/Specialty  none    Code Status  DNAR/DNI    Disposition    Anticipate discharge to: home with organized home healthcare and close outpatient follow-up    I have placed the appropriate orders for post-discharge needs.    Review of Systems  Review of Systems   Unable to perform ROS: Mental acuity        Physical Exam  Temp:  [36.2 °C (97.2 °F)-36.7 °C (98 °F)] 36.4 °C (97.5 °F)  Pulse:  [] 75  Resp:  [18-20] 18  BP: ()/(53-94) 124/87  SpO2:  [93 %-96 %] 96  %    Physical Exam  Vitals reviewed.   Constitutional:       General: She is not in acute distress.     Appearance: She is ill-appearing.   HENT:      Head: Normocephalic and atraumatic.      Nose: No congestion.   Eyes:      General:         Right eye: No discharge.         Left eye: No discharge.      Pupils: Pupils are equal, round, and reactive to light.   Cardiovascular:      Rate and Rhythm: Normal rate and regular rhythm.      Pulses: Normal pulses.      Heart sounds: Normal heart sounds. No murmur heard.  Pulmonary:      Effort: Pulmonary effort is normal. No respiratory distress.      Breath sounds: Normal breath sounds. No stridor.   Abdominal:      General: Bowel sounds are normal. There is no distension.      Palpations: Abdomen is soft.      Tenderness: There is no abdominal tenderness.   Musculoskeletal:         General: No swelling or tenderness. Normal range of motion.      Cervical back: Normal range of motion. No rigidity.   Skin:     General: Skin is warm.      Capillary Refill: Capillary refill takes less than 2 seconds.      Coloration: Skin is not jaundiced or pale.      Findings: No bruising.   Neurological:      General: No focal deficit present.      Mental Status: She is alert. She is disoriented.      Cranial Nerves: No cranial nerve deficit.      Comments: She is following commands.   Psychiatric:         Mood and Affect: Mood normal.         Behavior: Behavior normal.         Fluids  No intake or output data in the 24 hours ending 05/11/23 1439    Laboratory  Recent Labs     05/10/23  1201 05/11/23  0104   WBC 7.7 6.0   RBC 6.01* 4.98   HEMOGLOBIN 17.0* 14.4   HEMATOCRIT 52.9* 44.8   MCV 88.0 90.0   MCH 28.3 28.9   MCHC 32.1* 32.1*   RDW 42.6 43.5   PLATELETCT 260 168   MPV 9.8 10.2     Recent Labs     05/10/23  1201 05/11/23  0104   SODIUM 148* 150*   POTASSIUM 4.3 4.0   CHLORIDE 108 115*   CO2 25 19*   GLUCOSE 155* 93   BUN 56* 41*   CREATININE 1.45* 0.97   CALCIUM 10.5 9.0                    Imaging  US-RENAL   Final Result         1.  Atrophic echogenic appearing bilateral kidneys, appearance favors medical renal disease   2.  Debris within the bladder   3.  David catheter is not definitively identified in the bladder.      CT-HEAD W/O   Final Result      1.  No evidence of acute intracranial process.      2.  Cerebral atrophy as well as periventricular chronic small vessel ischemic change.         DX-CHEST-PORTABLE (1 VIEW)   Final Result      No acute cardiopulmonary disease evident.           Assessment/Plan  * Acute encephalopathy- (present on admission)  Assessment & Plan  Patient found to have acute encephalopathy.  She does not show signs of acute meningitis.  She is following commands and she is does not have neck rigidity.  Her white blood cell count is within normal limits UA and chest x-ray did not show signs of infection.  I discontinued IV antibiotic.  Her acute encephalopathy could be metabolic due to hypernatremia.  I discussed plan of care with bedside RN.  I attempted to call patient's son to obtain further information and update him with patient's son phone directly went on voicemail.      Hypernatremia  Assessment & Plan  Patient found with significant hypernatremia and her sodium level is trending up.  Ordered D5W.  I ordered every 6 hours sodium checks.  I ordered BMP for tomorrow.  Continue to monitor closely.    Debility  Assessment & Plan  Physical therapy evaluation and treatment.    Acute renal failure superimposed on stage 3 chronic kidney disease (HCC)  Assessment & Plan  Likely prerenal  IV fluid hydration  Avoid IV contrast/nephrotoxins/NSAIDs  Dose adjust meds for decreased GFR  I reviewed ultrasound renal did not show any obstructive uropathy but  I ordered BMP for tomorrow morning      Severe protein-calorie malnutrition (HCC)  Assessment & Plan  Dietary consult  IV thiamine  Monitor for refeeding syndrome  Patient found to have difficulty swallowing I placed  order for speech therapy evaluation.         I discussed plan of care with bedside RN.    VTE prophylaxis: heparin ppx    I have performed a physical exam and reviewed and updated ROS and Plan today (5/11/2023). In review of yesterday's note (5/10/2023), there are no changes except as documented above.

## 2023-05-12 PROBLEM — Z71.89 ADVANCE CARE PLANNING: Status: ACTIVE | Noted: 2023-01-01

## 2023-05-12 NOTE — CARE PLAN
Report received at change of shift, care assumed  Pt is oriented to self, unable to answer questions.  No s/s of distress noted. Fall precautions in place bed low and locked call light within reach, bed alarm on, treaded socks on ,  near rn station.     The patient is Stable - Low risk of patient condition declining or worsening    Shift Goals  Clinical Goals: safety, iv hydration.  Patient Goals: comfort    Progress made toward(s) clinical / shift goals:  cont to trend sodium levels, iv hydration, safety, skin integ, reposition     Patient is not progressing towards the following goals:      Problem: Fall Risk  Goal: Patient will remain free from falls  Outcome: Progressing     Problem: Skin Integrity  Goal: Skin integrity is maintained or improved  Outcome: Progressing

## 2023-05-12 NOTE — ASSESSMENT & PLAN NOTE
On May 12, 2023 I had a lengthy discussion with patient's son Cullen and his wife at the bedside.  I updated them regarding patient's current medical condition including lab work-up.  After discussion plan is to transition her care to hospice.  Patient family reported that she has significant dementia that has been progressively getting worse for the last 2 years.  I explained family regarding hospice and answered all of their questions.  I placed hospice referral.  I updated charge RN and bedside RN.  Plan is to discontinue lab draws and start her on a diet despite the risk of aspiration.  I answered family questions with best of my knowledge.    Time spent 19 minutes

## 2023-05-12 NOTE — DISCHARGE PLANNING
Informed by RN that Pt will need Hospice care per son's choice after hearing from the doctor this morning. Choice letter presented. Pt's son made choice and would like to have an informational meeting. BRIONNA phoned Campos with [facility]. He will make visit. We discussed the possibility of GIP bed and will await Campos's opinion if pt would be appropriate. If not, pt will be discharged to a Group Home under Hospice care.

## 2023-05-12 NOTE — THERAPY
"Speech Language Pathology   Clinical Swallow Evaluation     Patient Name: Lela Orozco  AGE:  89 y.o., SEX:  female  Medical Record #: [id number]  Date of Service: 2023      History of Present Illness  Patietn is 89 y.o. female with a diagnosis of GLF out of bed, workup ongoing, possible seizure.     PMHx Dementia   No previous hx of SLP services at [facility]    CMHx Acute enceohalopathy, hypernatremia, debility, ARF, severe protein-calorie malnutrition    CXR 5/10/23  The lung fields are clear.  There is no effusion or pneumothorax.    CT-Head 5/10/23  1.  No evidence of acute intracranial process.  2.  Cerebral atrophy as well as periventricular chronic small vessel ischemic change.      General Information:  Vitals  O2 Delivery Device: None - Room Air  Level of Consciousness: Lethargic  Patient Behaviors: Fatigue, Lethargic  Orientation: Self, , Current month  Follows Directives: Inconsistent      Prior Living Situation & Level of Function:  Prior Services: Continuous (24 Hour) Care Giving Family, Intermittent Physical Support for ADL Per Family  Housing / Facility: [address]  Lives with - Patient's Self Care Capacity: Adult Children  Comments: Per OT note: \"son, Elias to gather PLOF/PLS. She was fairly independent at home until the last week or so. Walking w/o AD, and she would need cues to complete her ADLs.She had a difficult time being thorough with bathing and pericare.\"     Communication: WFL  Swallowing: Patient reports consuming \"soft\" foods at baseline, pt is poor historian d/t ams.       Oral Mechanism Evaluation:  Dentition: Edentulous, Denture(s) not available at time of evaluation   Facial Symmetry: Equal  Facial Sensation: Pt did not follow commands to assess     Labial Observations: Open mouth posture   Lingual Observations: Midline, Xerostomia          Laryngeal Function:  Secretion Management: Adequate  Voice Quality: Presbyphonia  Cough: Perceptually WNL       Subjective  Patient " "cleared by RN for evaluation. Pt received awake, repositioned HOB 90*. Per RN, pt coughed w/milldy thick liquids last night. Pt endorsed consuming \"soft\" foods at baseline. Dentures not availale for evaluation, uncertain if pt owns dentures.      Assessment  Current Method of Nutrition: Oral diet (RG7/TN0)  Positioning: Bolanos's (60-90 degrees)  Bolus Administration: SLP, Patient  O2 Delivery Device: None - Room Air  Factor(s) Affecting Performance: Impaired endurance, Impaired mental status, Impaired command following  Tracheostomy : No          Swallowing Trials:  Swallowing Trials  Ice: WFL  Thin Liquid (TN0): WFL  Liquidised (LQ3): WFL  Soft & Bite Sized (SB6): Impaired  Regular (RG7): Not tested      Comments: Patient attempted to self-feed during evaluation, however, demonstrated difficulty likely r/t lethargy. Appropriate oral bolus acceptance and containment. Adequate bolus bolus stripping from utensil and labial seal w/straw. Prolonged mastication of soft/bite sized (~1min) w/ pt expectorating bolus due to difficulty. Pt endorsed consuming \"soft\" foods at baseline. No overt s/sx of aspiration appreciated. No cough/throat clearing noted. Vocal quality remained clear.       Clinical Impressions  Patient presents with oral dysphagia, suspect acute on chronic r/t AMS and lack of adequate dentition. Short-term diet modification is indicated. Pt would benefit from 1:1 feeding A.      Recommendations  Diet Consistency: Pureed solids and thin liquids  Instrumentation: Instrumental swallow study pending clinical progress  Medication: As tolerated  Supervision: 1:1 feeding with constant supervision, Encourage self-feeding  Positioning: Fully upright and midline during oral intake  Risk Management : Small bites/sips, Slow rate of intake  Oral Care: Q6h         SLP Treatment Plan  Treatment Plan: Dysphagia Treatment  SLP Frequency: 3x Per Week  Estimated Duration: Until Therapy Goals Met      Anticipated Discharge " "Needs  Discharge Recommendations: Other (TBD pending clinical progress)   Therapy Recommendations Upon DC: Dysphagia Training, Patient / Family / Caregiver Education        Patient / Family Goals  Patient / Family Goal #1: \"okay, sure\" - water  Short Term Goals  Short Term Goal # 1: Patient will consume a diet of pureed solids and thin liquids with no overt s/sx of aspiration given 1:1 feeding A      Becky Nuñez MS,CCC-SLP      "

## 2023-05-12 NOTE — DISCHARGE PLANNING
Received Choice form at 1963  Agency/Facility Name: [facility]  Referral sent per Choice form @ [id number]

## 2023-05-12 NOTE — CONSULTS
Palliative Care   Received consult for ACP.  Per records review RN CM assisted patient's son with hospice choice and awaiting meeting with [facility].  Will defer consult. Please reach out to our team if complex palliative care needs arise.    ROSETTA Royal.  Palliative Care Nurse Practitioner  [phone]

## 2023-05-12 NOTE — PROGRESS NOTES
Received report and assumed care of patient at change of shift. Patient is A&Ox1 to self only, on RA, and reports no pain at this time. Patient assessment completed, bed in lowest position, and call light and personal belongings are within reach. Patient expressed no further needs at this time.      Discussed new diet orders with Speech at bedside. Patient requiring 1:1 feeds and encouragement to eat.

## 2023-05-12 NOTE — HOSPICE
[facility] referral/consult response    Is this patient accepted to [facility]?:  Yes  What hospice level of care?: routine  Approved by provider: Dr. Del Rosario    Anticipated DC date: ?  DC Barriers: Placement  Additional Information: Family unable to care for pt.  Pt did state that she was feeling SOB.  She had increased work of breathing when talking.      Discussed comfort care, family does not want to do anything more to artificially extend her life.  They understand no new treatment will be started either.    Dr. Cunha voalted.

## 2023-05-12 NOTE — PROGRESS NOTES
Hospital Medicine Daily Progress Note    Date of Service  5/12/2023    Chief Complaint  Lela Orozco is a 89 y.o. female admitted 5/10/2023 with altered mental status    Hospital Course    Lela Orozco is a 89 y.o. female who presented 5/10/2023 with past medical history of Alzheimer's dementia, seizure disorder who comes into the emergency room for altered mental status.  She was found on the ground by her son this morning.  Patient has been lethargic for the past 2 weeks and has not been eating well at home.  Patient is unable to answer my questions appropriately.  She was started on Keppra 500 twice daily 1 month prior.  During that time she did have an 24-hour EEG at her neurologist office that found intermittent independent bitemporal slowing.  No focal or generalized epileptic activity noted.  She found to hypernatremia I started her on D5W.    Interval Problem Update    05/12/23  I evaluated and examined her at the bedside.  She was somnolent and did not communicate with me.  She was able to open her eyes.  I had a lengthy discussion with patient's son Cullen and his wife at the bedside.  I updated them regarding patient's current medical condition including lab work-up.  After discussion plan is to transition her care to hospice.  Patient family reported that she has significant dementia that has been progressively getting worse for the last 2 years.  I explained family regarding hospice and answered all of their questions.  I placed hospice referral.  I updated charge RN and bedside RN.  Plan is to discontinue lab draws and start her on a diet despite the risk of aspiration.  I answered family questions with best of my knowledge.      I have discussed this patient's plan of care and discharge plan at IDT rounds today with Case Management, Nursing, Nursing leadership, and other members of the IDT team.    Consultants/Specialty  none    Code Status  DNAR/DNI    Disposition  The patient is  medically cleared for discharge to home or a post-acute facility.  Anticipate discharge to: hospice    I have placed the appropriate orders for post-discharge needs.    Review of Systems  Review of Systems   Unable to perform ROS: Mental acuity        Physical Exam  Temp:  [35.8 °C (96.5 °F)-36.6 °C (97.9 °F)] 35.8 °C (96.5 °F)  Pulse:  [77-90] 82  Resp:  [18-20] 19  BP: (136-154)/(64-93) 140/93  SpO2:  [94 %-97 %] 97 %    Physical Exam  Vitals reviewed.   Constitutional:       General: She is not in acute distress.     Appearance: She is ill-appearing.   HENT:      Head: Normocephalic and atraumatic.      Nose: No congestion.   Eyes:      General:         Right eye: No discharge.         Left eye: No discharge.      Pupils: Pupils are equal, round, and reactive to light.   Cardiovascular:      Rate and Rhythm: Normal rate and regular rhythm.      Pulses: Normal pulses.      Heart sounds: Normal heart sounds. No murmur heard.  Pulmonary:      Effort: Pulmonary effort is normal. No respiratory distress.      Breath sounds: Normal breath sounds. No stridor.   Abdominal:      General: Bowel sounds are normal. There is no distension.      Palpations: Abdomen is soft.      Tenderness: There is no abdominal tenderness.   Musculoskeletal:         General: No swelling or tenderness. Normal range of motion.      Cervical back: Normal range of motion. No rigidity.   Skin:     General: Skin is warm.      Capillary Refill: Capillary refill takes less than 2 seconds.      Coloration: Skin is not jaundiced or pale.      Findings: No bruising.   Neurological:      General: No focal deficit present.      Mental Status: She is alert. She is disoriented.      Cranial Nerves: No cranial nerve deficit.   Psychiatric:         Mood and Affect: Mood normal.         Behavior: Behavior normal.         Fluids    Intake/Output Summary (Last 24 hours) at 5/12/2023 1314  Last data filed at 5/12/2023 1100  Gross per 24 hour   Intake 20 ml    Output 2000 ml   Net -1980 ml       Laboratory  Recent Labs     05/10/23  1201 05/11/23  0104 05/12/23  0001   WBC 7.7 6.0 6.3   RBC 6.01* 4.98 4.90   HEMOGLOBIN 17.0* 14.4 13.7   HEMATOCRIT 52.9* 44.8 43.4   MCV 88.0 90.0 88.6   MCH 28.3 28.9 28.0   MCHC 32.1* 32.1* 31.6*   RDW 42.6 43.5 41.4   PLATELETCT 260 168 193   MPV 9.8 10.2 9.9       Recent Labs     05/10/23  1201 05/11/23  0104 05/11/23  1819 05/12/23  0159 05/12/23  0607   SODIUM 148* 150* 144 141 142   POTASSIUM 4.3 4.0  --   --  3.8   CHLORIDE 108 115*  --   --  106   CO2 25 19*  --   --  26   GLUCOSE 155* 93  --   --  112*   BUN 56* 41*  --   --  30*   CREATININE 1.45* 0.97  --   --  0.87   CALCIUM 10.5 9.0  --   --  8.9                     Imaging  US-RENAL   Final Result         1.  Atrophic echogenic appearing bilateral kidneys, appearance favors medical renal disease   2.  Debris within the bladder   3.  David catheter is not definitively identified in the bladder.      CT-HEAD W/O   Final Result      1.  No evidence of acute intracranial process.      2.  Cerebral atrophy as well as periventricular chronic small vessel ischemic change.         DX-CHEST-PORTABLE (1 VIEW)   Final Result      No acute cardiopulmonary disease evident.             Assessment/Plan  * Acute encephalopathy- (present on admission)  Assessment & Plan  Patient found to have acute encephalopathy.  She does not show signs of acute meningitis.  She is following commands and she is does not have neck rigidity.  Her white blood cell count is within normal limits UA and chest x-ray did not show signs of infection.  I discontinued IV antibiotic.  Her acute encephalopathy could be metabolic due to hypernatremia.  I discussed plan of care with bedside RN.  Hyponatremia now resolved.  Plan is to transition her to hospice.  I discussed plan of care with patient's family at the bedside.      Advance care planning  Assessment & Plan  I had a lengthy discussion with patient's son Cullen  and his wife at the bedside.  I updated them regarding patient's current medical condition including lab work-up.  After discussion plan is to transition her care to hospice.  Patient family reported that she has significant dementia that has been progressively getting worse for the last 2 years.  I explained family regarding hospice and answered all of their questions.  I placed hospice referral.  I updated charge RN and bedside RN.  Plan is to discontinue lab draws and start her on a diet despite the risk of aspiration.  I answered family questions with best of my knowledge.    Time spent 19 minutes    Hypernatremia  Assessment & Plan  Patient found with significant hypernatremia and her sodium level is trending up.  Ordered D5W.  Hypernatremia now resolved.  Discontinue lab draws as per patient family request.    Debility  Assessment & Plan  Physical therapy evaluation and treatment.    Acute renal failure superimposed on stage 3 chronic kidney disease (HCC)  Assessment & Plan  Likely prerenal  IV fluid hydration  Avoid IV contrast/nephrotoxins/NSAIDs  Dose adjust meds for decreased GFR  I reviewed ultrasound renal did not show any obstructive uropathy but  I ordered BMP for tomorrow morning      Severe protein-calorie malnutrition (HCC)  Assessment & Plan  Dietary consult  IV thiamine  Monitor for refeeding syndrome  Patient found to have difficulty swallowing I placed order for speech therapy evaluation.  Plan is to discharge her with hospice.         I had a lengthy discussion with patient family at the bedside and answered all of their questions.  I discussed plan of care with bedside RN and charge RN.    I discussed with [facility] RN Dave regarding patient current condition and plan of care.  I discussed plan of care with .  [facility] discussed with patient family and after discussion they requested to initiate comfort care measures.  I initiated comfort care measures.    VTE  prophylaxis: heparin ppx    I have performed a physical exam and reviewed and updated ROS and Plan today (5/12/2023). In review of yesterday's note (5/11/2023), there are no changes except as documented above.

## 2023-05-13 NOTE — PROGRESS NOTES
Hospital Medicine Daily Progress Note    Date of Service  5/13/2023    Chief Complaint  Lela Orozco is a 89 y.o. female admitted 5/10/2023 with altered mental status    Hospital Course    Lela Orozco is a 89 y.o. female who presented 5/10/2023 with past medical history of Alzheimer's dementia, seizure disorder who comes into the emergency room for altered mental status.  She was found on the ground by her son this morning.  Patient has been lethargic for the past 2 weeks and has not been eating well at home.  Patient is unable to answer my questions appropriately.  She was started on Keppra 500 twice daily 1 month prior.  During that time she did have an 24-hour EEG at her neurologist office that found intermittent independent bitemporal slowing.  No focal or generalized epileptic activity noted.  She found to hypernatremia I started her on D5W.    Interval Problem Update    05/13/23  I evaluated and examined her at the bedside.  She was somnolent this morning.  Denies complaint of pain.  Continue comfort care measures      I have discussed this patient's plan of care and discharge plan at IDT rounds today with Case Management, Nursing, Nursing leadership, and other members of the IDT team.    Consultants/Specialty  none    Code Status  Comfort Care/DNR    Disposition  The patient is medically cleared for discharge to home or a post-acute facility.  Anticipate discharge to: hospice    I have placed the appropriate orders for post-discharge needs.    Review of Systems  Review of Systems   Unable to perform ROS: Mental acuity        Physical Exam  Temp:  [36 °C (96.8 °F)-36.2 °C (97.1 °F)] 36 °C (96.8 °F)  Pulse:  [] 89  Resp:  [16-18] 17  BP: (128-133)/(78-86) 133/86  SpO2:  [93 %-96 %] 96 %    Physical Exam  Vitals reviewed.   Constitutional:       General: She is not in acute distress.     Appearance: She is ill-appearing.   HENT:      Head: Normocephalic and atraumatic.      Nose: No  congestion.   Eyes:      General:         Right eye: No discharge.         Left eye: No discharge.      Pupils: Pupils are equal, round, and reactive to light.   Cardiovascular:      Rate and Rhythm: Normal rate and regular rhythm.      Pulses: Normal pulses.      Heart sounds: Normal heart sounds. No murmur heard.  Pulmonary:      Effort: Pulmonary effort is normal. No respiratory distress.      Breath sounds: Normal breath sounds. No stridor.   Abdominal:      General: Bowel sounds are normal. There is no distension.      Palpations: Abdomen is soft.      Tenderness: There is no abdominal tenderness.   Musculoskeletal:         General: No swelling or tenderness. Normal range of motion.      Cervical back: Normal range of motion. No rigidity.   Skin:     General: Skin is warm.      Capillary Refill: Capillary refill takes less than 2 seconds.      Coloration: Skin is not jaundiced or pale.      Findings: No bruising.   Neurological:      General: No focal deficit present.      Mental Status: She is alert. She is disoriented.      Cranial Nerves: No cranial nerve deficit.   Psychiatric:         Mood and Affect: Mood normal.         Behavior: Behavior normal.         Fluids    Intake/Output Summary (Last 24 hours) at 5/13/2023 1546  Last data filed at 5/12/2023 2316  Gross per 24 hour   Intake --   Output 800 ml   Net -800 ml         Laboratory  Recent Labs     05/11/23  0104 05/12/23  0001   WBC 6.0 6.3   RBC 4.98 4.90   HEMOGLOBIN 14.4 13.7   HEMATOCRIT 44.8 43.4   MCV 90.0 88.6   MCH 28.9 28.0   MCHC 32.1* 31.6*   RDW 43.5 41.4   PLATELETCT 168 193   MPV 10.2 9.9       Recent Labs     05/11/23  0104 05/11/23  1819 05/12/23  0159 05/12/23  0607   SODIUM 150* 144 141 142   POTASSIUM 4.0  --   --  3.8   CHLORIDE 115*  --   --  106   CO2 19*  --   --  26   GLUCOSE 93  --   --  112*   BUN 41*  --   --  30*   CREATININE 0.97  --   --  0.87   CALCIUM 9.0  --   --  8.9                     Imaging  US-RENAL   Final Result          1.  Atrophic echogenic appearing bilateral kidneys, appearance favors medical renal disease   2.  Debris within the bladder   3.  David catheter is not definitively identified in the bladder.      CT-HEAD W/O   Final Result      1.  No evidence of acute intracranial process.      2.  Cerebral atrophy as well as periventricular chronic small vessel ischemic change.         DX-CHEST-PORTABLE (1 VIEW)   Final Result      No acute cardiopulmonary disease evident.             Assessment/Plan  * Acute encephalopathy- (present on admission)  Assessment & Plan  Patient found to have acute encephalopathy.  She does not show signs of acute meningitis.  She is following commands and she is does not have neck rigidity.  Her white blood cell count is within normal limits UA and chest x-ray did not show signs of infection.  I discontinued IV antibiotic.  Her acute encephalopathy could be metabolic due to hypernatremia.  I discussed plan of care with bedside RN.  Hyponatremia now resolved.  Plan is to transition her to hospice.  I discussed plan of care with patient's family at the bedside.  Initiated comfort care measures on May 12, 2023    Advance care planning  Assessment & Plan  On May 12, 2023 I had a lengthy discussion with patient's son Cullen and his wife at the bedside.  I updated them regarding patient's current medical condition including lab work-up.  After discussion plan is to transition her care to hospice.  Patient family reported that she has significant dementia that has been progressively getting worse for the last 2 years.  I explained family regarding hospice and answered all of their questions.  I placed hospice referral.  I updated charge RN and bedside RN.  Plan is to discontinue lab draws and start her on a diet despite the risk of aspiration.  I answered family questions with best of my knowledge.    Time spent 19 minutes    Hypernatremia  Assessment & Plan  Patient found with significant  hypernatremia and her sodium level is trending up.  Ordered D5W.  Hypernatremia now resolved.  Discontinue lab draws as per patient family request.    Debility  Assessment & Plan  Physical therapy evaluation and treatment.    Acute renal failure superimposed on stage 3 chronic kidney disease (HCC)  Assessment & Plan  Likely prerenal  IV fluid hydration  Avoid IV contrast/nephrotoxins/NSAIDs  Dose adjust meds for decreased GFR  I reviewed ultrasound renal did not show any obstructive uropathy but  Initiated comfort care measures on May 12, 2023      Severe protein-calorie malnutrition (HCC)  Assessment & Plan  Dietary consult  IV thiamine  Monitor for refeeding syndrome  Patient found to have difficulty swallowing I placed order for speech therapy evaluation.  Initiated comfort care measures on May 12, 2023       No acute changes in current assessment plan.  Continue comfort care measures on May 13, 2023.    VTE prophylaxis: heparin ppx    I have performed a physical exam and reviewed and updated ROS and Plan today (5/13/2023). In review of yesterday's note (5/12/2023), there are no changes except as documented above.

## 2023-05-13 NOTE — CARE PLAN
The patient is comfort care    Shift Goals  Clinical Goals: Comfort, safety  Patient Goals: Comfort  Family Goals: not  present    Progress made toward(s) clinical / shift goals:  Patient resting in a calm low stimuli environment, Q2 turns in place, medicating for comfort per MAR, urinary diversion devices in use for comfort.       Patient is not progressing towards the following goals:

## 2023-05-13 NOTE — CARE PLAN
The patient is Stable - Low risk of patient condition declining or worsening    Shift Goals  Clinical Goals: safety  Patient Goals: comfort    Progress made toward(s) clinical / shift goals:      Patient is not progressing towards the following goals:

## 2023-05-13 NOTE — PROGRESS NOTES
Received pt via bed from [facility]. Pt is alert and pleasantly confused. David cath in place. CNA and I repositioned and her and got her comfortable. Oral care given. She denies pain but does cry out and hold her left leg when we were repositioning her. She settled right back down as soon as we got her positioned and left her alone. David patent to bedside bag with yellow urine noted. Bed low and locked with call light within easy reach for safety. Monitor for need.

## 2023-05-14 NOTE — CARE PLAN
The patient is Stable - Low risk of patient condition declining or worsening    Shift Goals  Clinical Goals: comfort  Patient Goals: comfort  Family Goals: not  present    Progress made toward(s) clinical / shift goals:    Problem: Fall Risk  Goal: Patient will remain free from falls  Outcome: Progressing  Note: No falls this shift, bed in lowest position and locked, non slip socks on and bed alarm on.        Problem: Pain - Standard  Goal: Alleviation of pain or a reduction in pain to the patient’s comfort goal  Outcome: Progressing  Note: PRN pain medication given this shift        Patient is not progressing towards the following goals:

## 2023-05-14 NOTE — PROGRESS NOTES
Hospital Medicine Daily Progress Note    Date of Service  5/14/2023    Chief Complaint  Lela Orozco is a 89 y.o. female admitted 5/10/2023 with altered mental status    Hospital Course    Lela Orozco is a 89 y.o. female who presented 5/10/2023 with past medical history of Alzheimer's dementia, seizure disorder who comes into the emergency room for altered mental status.  She was found on the ground by her son this morning.  Patient has been lethargic for the past 2 weeks and has not been eating well at home.  Patient is unable to answer my questions appropriately.  She was started on Keppra 500 twice daily 1 month prior.  During that time she did have an 24-hour EEG at her neurologist office that found intermittent independent bitemporal slowing.  No focal or generalized epileptic activity noted.  She found to hypernatremia I started her on D5W.    Interval Problem Update    05/14/23    I evaluated her at the bedside.  I discussed plan of care with patient's son and his wife.  Continue comfort care measures.    I have discussed this patient's plan of care and discharge plan at IDT rounds today with Case Management, Nursing, Nursing leadership, and other members of the IDT team.    Consultants/Specialty  none    Code Status  Comfort Care/DNR    Disposition  The patient is medically cleared for discharge to home or a post-acute facility.  Anticipate discharge to: hospice    I have placed the appropriate orders for post-discharge needs.    Review of Systems  Review of Systems   Unable to perform ROS: Mental acuity        Physical Exam  Temp:  [36.2 °C (97.2 °F)] 36.2 °C (97.2 °F)  Pulse:  [99] 99  Resp:  [16-17] 16  BP: (148)/(84) 148/84  SpO2:  [97 %] 97 %    Physical Exam  Vitals reviewed.   Constitutional:       General: She is not in acute distress.     Appearance: She is ill-appearing.   HENT:      Head: Normocephalic and atraumatic.      Nose: No congestion.   Eyes:      General:         Right  eye: No discharge.         Left eye: No discharge.      Pupils: Pupils are equal, round, and reactive to light.   Cardiovascular:      Rate and Rhythm: Normal rate and regular rhythm.      Pulses: Normal pulses.      Heart sounds: Normal heart sounds. No murmur heard.  Pulmonary:      Effort: Pulmonary effort is normal. No respiratory distress.      Breath sounds: Normal breath sounds. No stridor.   Abdominal:      General: Bowel sounds are normal. There is no distension.      Palpations: Abdomen is soft.      Tenderness: There is no abdominal tenderness.   Musculoskeletal:         General: No swelling or tenderness. Normal range of motion.      Cervical back: Normal range of motion. No rigidity.   Skin:     General: Skin is warm.      Capillary Refill: Capillary refill takes less than 2 seconds.      Coloration: Skin is not jaundiced or pale.      Findings: No bruising.   Neurological:      General: No focal deficit present.      Mental Status: She is alert. She is disoriented.      Cranial Nerves: No cranial nerve deficit.   Psychiatric:         Mood and Affect: Mood normal.         Behavior: Behavior normal.         Fluids    Intake/Output Summary (Last 24 hours) at 5/14/2023 1536  Last data filed at 5/13/2023 1652  Gross per 24 hour   Intake --   Output 250 ml   Net -250 ml         Laboratory  Recent Labs     05/12/23  0001   WBC 6.3   RBC 4.90   HEMOGLOBIN 13.7   HEMATOCRIT 43.4   MCV 88.6   MCH 28.0   MCHC 31.6*   RDW 41.4   PLATELETCT 193   MPV 9.9       Recent Labs     05/11/23  1819 05/12/23  0159 05/12/23  0607   SODIUM 144 141 142   POTASSIUM  --   --  3.8   CHLORIDE  --   --  106   CO2  --   --  26   GLUCOSE  --   --  112*   BUN  --   --  30*   CREATININE  --   --  0.87   CALCIUM  --   --  8.9                     Imaging  US-RENAL   Final Result         1.  Atrophic echogenic appearing bilateral kidneys, appearance favors medical renal disease   2.  Debris within the bladder   3.  David catheter is not  definitively identified in the bladder.      CT-HEAD W/O   Final Result      1.  No evidence of acute intracranial process.      2.  Cerebral atrophy as well as periventricular chronic small vessel ischemic change.         DX-CHEST-PORTABLE (1 VIEW)   Final Result      No acute cardiopulmonary disease evident.             Assessment/Plan  * Acute encephalopathy- (present on admission)  Assessment & Plan  Patient found to have acute encephalopathy.  She does not show signs of acute meningitis.  She is following commands and she is does not have neck rigidity.  Her white blood cell count is within normal limits UA and chest x-ray did not show signs of infection.  I discontinued IV antibiotic.  Her acute encephalopathy could be metabolic due to hypernatremia.  I discussed plan of care with bedside RN.  Hyponatremia now resolved.  Plan is to transition her to hospice.  I discussed plan of care with patient's family at the bedside.  Initiated comfort care measures on May 12, 2023    Advance care planning  Assessment & Plan  On May 12, 2023 I had a lengthy discussion with patient's son Cullen and his wife at the bedside.  I updated them regarding patient's current medical condition including lab work-up.  After discussion plan is to transition her care to hospice.  Patient family reported that she has significant dementia that has been progressively getting worse for the last 2 years.  I explained family regarding hospice and answered all of their questions.  I placed hospice referral.  I updated charge RN and bedside RN.  Plan is to discontinue lab draws and start her on a diet despite the risk of aspiration.  I answered family questions with best of my knowledge.    Time spent 19 minutes    Hypernatremia  Assessment & Plan  Patient found with significant hypernatremia and her sodium level is trending up.  Ordered D5W.  Hypernatremia now resolved.  Discontinue lab draws as per patient family  request.    Debility  Assessment & Plan  Physical therapy evaluation and treatment.    Acute renal failure superimposed on stage 3 chronic kidney disease (HCC)  Assessment & Plan  Likely prerenal  IV fluid hydration  Avoid IV contrast/nephrotoxins/NSAIDs  Dose adjust meds for decreased GFR  I reviewed ultrasound renal did not show any obstructive uropathy but  Initiated comfort care measures on May 12, 2023      Severe protein-calorie malnutrition (HCC)  Assessment & Plan  Dietary consult  IV thiamine  Monitor for refeeding syndrome  Patient found to have difficulty swallowing I placed order for speech therapy evaluation.  Initiated comfort care measures on May 12, 2023       No acute changes in current assessment plan.  Continue comfort care measures on May 14, 2023.    VTE prophylaxis: heparin ppx    I have performed a physical exam and reviewed and updated ROS and Plan today (5/14/2023). In review of yesterday's note (5/13/2023), there are no changes except as documented above.

## 2023-05-14 NOTE — CARE PLAN
The patient is Stable - Low risk of patient condition declining or worsening    Shift Goals  Clinical Goals: Comfort, safety  Patient Goals: Comfort  Family Goals: not  present    Progress made toward(s) clinical / shift goals:      Patient is not progressing towards the following goals:      Problem: Knowledge Deficit - Standard  Goal: Patient and family/care givers will demonstrate understanding of plan of care, disease process/condition, diagnostic tests and medications  Outcome: Not Progressing     Problem: Fall Risk  Goal: Patient will remain free from falls  Outcome: Not Progressing     Problem: Skin Integrity  Goal: Skin integrity is maintained or improved  Outcome: Not Progressing     Problem: Pain - Standard  Goal: Alleviation of pain or a reduction in pain to the patient’s comfort goal  Outcome: Not Progressing

## 2023-05-15 NOTE — PROGRESS NOTES
Received report from NOC RN, pt care assumed. Pt appears comfortable in bed. No signs of pain or distress at at this time. Bed-alarm on.

## 2023-05-15 NOTE — PROGRESS NOTES
Hospital Medicine Daily Progress Note    Date of Service  5/15/2023    Chief Complaint  Lela Orozco is a 89 y.o. female admitted 5/10/2023 with altered mental status    Hospital Course    Lela Orozoc is a 89 y.o. female who presented 5/10/2023 with past medical history of Alzheimer's dementia, seizure disorder who comes into the emergency room for altered mental status.  She was found on the ground by her son this morning.  Patient has been lethargic for the past 2 weeks and has not been eating well at home.  Patient is unable to answer my questions appropriately.  She was started on Keppra 500 twice daily 1 month prior.  During that time she did have an 24-hour EEG at her neurologist office that found intermittent independent bitemporal slowing.  No focal or generalized epileptic activity noted.  She found to hypernatremia I started her on D5W.    Interval Problem Update    05/15/23    I evaluated her at the bedside.  Continue comfort care measures.  Discussed plan of care with case management.      I have discussed this patient's plan of care and discharge plan at IDT rounds today with Case Management, Nursing, Nursing leadership, and other members of the IDT team.    Consultants/Specialty  none    Code Status  Comfort Care/DNR    Disposition    Anticipate discharge to: hospice    I have placed the appropriate orders for post-discharge needs.    Review of Systems  Review of Systems   Unable to perform ROS: Mental acuity        Physical Exam  Temp:  [36.1 °C (96.9 °F)-36.3 °C (97.3 °F)] 36.1 °C (96.9 °F)  Pulse:  [] 98  Resp:  [16-19] 19  BP: (127-128)/(70-80) 128/70  SpO2:  [90 %-95 %] 90 %    Physical Exam  Deferred physical exam as patient is on comfort care.  She is comfortable and sleeping at the time of evaluation.  Fluids  No intake or output data in the 24 hours ending 05/15/23 1451      Laboratory                            Imaging  US-RENAL   Final Result         1.  Atrophic  echogenic appearing bilateral kidneys, appearance favors medical renal disease   2.  Debris within the bladder   3.  David catheter is not definitively identified in the bladder.      CT-HEAD W/O   Final Result      1.  No evidence of acute intracranial process.      2.  Cerebral atrophy as well as periventricular chronic small vessel ischemic change.         DX-CHEST-PORTABLE (1 VIEW)   Final Result      No acute cardiopulmonary disease evident.             Assessment/Plan  * Acute encephalopathy- (present on admission)  Assessment & Plan  Patient found to have acute encephalopathy.  She does not show signs of acute meningitis.  She is following commands and she is does not have neck rigidity.  Her white blood cell count is within normal limits UA and chest x-ray did not show signs of infection.  I discontinued IV antibiotic.  Her acute encephalopathy could be metabolic due to hypernatremia.  I discussed plan of care with bedside RN.  Hyponatremia now resolved.  Plan is to transition her to hospice.  I discussed plan of care with patient's family at the bedside.  Initiated comfort care measures on May 12, 2023    Advance care planning  Assessment & Plan  On May 12, 2023 I had a lengthy discussion with patient's son Cullen and his wife at the bedside.  I updated them regarding patient's current medical condition including lab work-up.  After discussion plan is to transition her care to hospice.  Patient family reported that she has significant dementia that has been progressively getting worse for the last 2 years.  I explained family regarding hospice and answered all of their questions.  I placed hospice referral.  I updated charge RN and bedside RN.  Plan is to discontinue lab draws and start her on a diet despite the risk of aspiration.  I answered family questions with best of my knowledge.    Time spent 19 minutes    Hypernatremia  Assessment & Plan  Patient found with significant hypernatremia and her sodium  level is trending up.  Ordered D5W.  Hypernatremia now resolved.  Discontinue lab draws as per patient family request.    Debility  Assessment & Plan  Physical therapy evaluation and treatment.    Acute renal failure superimposed on stage 3 chronic kidney disease (HCC)  Assessment & Plan  Likely prerenal  IV fluid hydration  Avoid IV contrast/nephrotoxins/NSAIDs  Dose adjust meds for decreased GFR  I reviewed ultrasound renal did not show any obstructive uropathy but  Initiated comfort care measures on May 12, 2023      Severe protein-calorie malnutrition (HCC)  Assessment & Plan  Dietary consult  IV thiamine  Monitor for refeeding syndrome  Patient found to have difficulty swallowing I placed order for speech therapy evaluation.  Initiated comfort care measures on May 12, 2023       No acute changes in current assessment plan.  Continue comfort care measures on May 15, 2023.  I discussed plan of care with case management.    VTE prophylaxis: heparin ppx    I have performed a physical exam and reviewed and updated ROS and Plan today (5/15/2023). In review of yesterday's note (5/14/2023), there are no changes except as documented above.

## 2023-05-15 NOTE — HOSPICE
[facility] Hospice referral/consult response    Is this patient accepted to [facility]?: Yes  What hospice level of care?:Community  Approved by provider: Dr. Del Rosario    Anticipated DC date: unknown  DC Barriers:Placement  Additional Information: Patient not appropriate for GIP at this time as patient is comfortable and has only received 12mg morphine in past 24 hours. She has not received any lorazepam. Will continue to monitor for GIP daily

## 2023-05-16 NOTE — CARE PLAN
Problem: Pain - Standard  Goal: Alleviation of pain or a reduction in pain to the patient’s comfort goal  Outcome: Progressing   The patient is Unstable - High likelihood or risk of patient condition declining or worsening    Shift Goals  Clinical Goals: comfort  Patient Goals: comfort  Family Goals: comfort    Progress made toward(s) clinical / shift goals:  medicated prn for comfort    Patient is not progressing towards the following goals:

## 2023-05-16 NOTE — PROGRESS NOTES
Pt is on comfort care, non-verbal, no s/sx pain/discomfort at this time. Pt on q2h turns, anderson catheter patent. Call light within reach, personal belongings available, bed in lowest position, treaded socks on, and bed alarm on. Hourly rounding in place.

## 2023-05-16 NOTE — THERAPY
Speech Language Therapy Contact Note    Patient Name: Lela Orozco  Age:  89 y.o., Sex:  female  Medical Record #: [id number]  Today's Date: 5/16/2023 05/16/23 0817   Treatment Variance   Reason For Missed Therapy Non-Medical - Other (Please Comment)   Interdisciplinary Plan of Care Collaboration   Collaboration Comments Per EMR, pt has transitioned to comfort care measures. SLP will sign off but please re-consult with change in status. Thank you.

## 2023-05-16 NOTE — PROGRESS NOTES
Hospital Medicine Daily Progress Note    Date of Service  5/16/2023    Chief Complaint  Lela Orozco is a 89 y.o. female admitted 5/10/2023 with altered mental status    Hospital Course    Lela Orozco is a 89 y.o. female who presented 5/10/2023 with past medical history of Alzheimer's dementia, seizure disorder who comes into the emergency room for altered mental status.  She was found on the ground by her son this morning.  Patient has been lethargic for the past 2 weeks and has not been eating well at home.  Patient is unable to answer my questions appropriately.  She was started on Keppra 500 twice daily 1 month prior.  During that time she did have an 24-hour EEG at her neurologist office that found intermittent independent bitemporal slowing.  No focal or generalized epileptic activity noted.  She found to hypernatremia I started her on D5W.    Interval Problem Update    05/15/23    I evaluated her at the bedside.  Continue comfort care measures.  Discussed plan of care with case management.    5/16 on CC, appears comfortable      I have discussed this patient's plan of care and discharge plan at IDT rounds today with Case Management, Nursing, Nursing leadership, and other members of the IDT team.    Consultants/Specialty  none    Code Status  Comfort Care/DNR    Disposition  Medically Cleared  I have placed the appropriate orders for post-discharge needs.    Review of Systems  Review of Systems   Unable to perform ROS: Mental acuity        Physical Exam  Temp:  [36.2 °C (97.2 °F)-37.8 °C (100 °F)] 36.2 °C (97.2 °F)  Pulse:  [] 91  Resp:  [15-18] 18  BP: ()/(55-72) 132/72  SpO2:  [84 %-91 %] 91 %    Physical Exam  Constitutional:       Appearance: She is ill-appearing. She is not diaphoretic.      Comments: frail   Pulmonary:      Comments: Easy, unlabored bs  Skin:     Coloration: Skin is pale.   Neurological:      Motor: Weakness present.       Deferred physical exam as patient is  on comfort care.  She is comfortable and sleeping at the time of evaluation.  Fluids    Intake/Output Summary (Last 24 hours) at 5/16/2023 1507  Last data filed at 5/16/2023 0430  Gross per 24 hour   Intake --   Output 150 ml   Net -150 ml       Laboratory                            Imaging  US-RENAL   Final Result         1.  Atrophic echogenic appearing bilateral kidneys, appearance favors medical renal disease   2.  Debris within the bladder   3.  David catheter is not definitively identified in the bladder.      CT-HEAD W/O   Final Result      1.  No evidence of acute intracranial process.      2.  Cerebral atrophy as well as periventricular chronic small vessel ischemic change.         DX-CHEST-PORTABLE (1 VIEW)   Final Result      No acute cardiopulmonary disease evident.             Assessment/Plan  * Acute encephalopathy- (present on admission)  Assessment & Plan  Patient found to have acute encephalopathy.  She does not show signs of acute meningitis.  She is following commands and she is does not have neck rigidity.  Her white blood cell count is within normal limits UA and chest x-ray did not show signs of infection.  I discontinued IV antibiotic.  Her acute encephalopathy could be metabolic due to hypernatremia.  I discussed plan of care with bedside RN.  Hyponatremia now resolved.  Plan is to transition her to hospice.  I discussed plan of care with patient's family at the bedside.  Initiated comfort care measures on May 12, 2023  5/16 eval by in hospice pending    Advance care planning  Assessment & Plan  On May 12, 2023 I had a lengthy discussion with patient's son Cullen and his wife at the bedside.  I updated them regarding patient's current medical condition including lab work-up.  After discussion plan is to transition her care to hospice.  Patient family reported that she has significant dementia that has been progressively getting worse for the last 2 years.  I explained family regarding  hospice and answered all of their questions.  I placed hospice referral.  I updated charge RN and bedside RN.  Plan is to discontinue lab draws and start her on a diet despite the risk of aspiration.  I answered family questions with best of my knowledge.    Time spent 19 minutes    Hypernatremia  Assessment & Plan  Patient found with significant hypernatremia and her sodium level is trending up.  Ordered D5W.  Hypernatremia now resolved.  Discontinue lab draws as per patient family request.    Debility  Assessment & Plan  Physical therapy evaluation and treatment.    Acute renal failure superimposed on stage 3 chronic kidney disease (HCC)  Assessment & Plan  Likely prerenal  IV fluid hydration  Avoid IV contrast/nephrotoxins/NSAIDs  Dose adjust meds for decreased GFR  I reviewed ultrasound renal did not show any obstructive uropathy but  Initiated comfort care measures on May 12, 2023      Severe protein-calorie malnutrition (HCC)  Assessment & Plan  Dietary consult  IV thiamine  Monitor for refeeding syndrome  Patient found to have difficulty swallowing I placed order for speech therapy evaluation.  Initiated comfort care measures on May 12, 2023       No acute changes in current assessment plan.  Continue comfort care measures on May 15, 2023.  I discussed plan of care with case management.    VTE prophylaxis: heparin ppx    I have performed a physical exam and reviewed and updated ROS and Plan today (5/16/2023). In review of yesterday's note (5/15/2023), there are no changes except as documented above.

## 2023-05-16 NOTE — THERAPY
Physical Therapy   Discharge     Patient Name: Lela Orozco  Age:  89 y.o., Sex:  female  Medical Record #: [id number]  Today's Date: 5/16/2023 05/16/23 1208   Interdisciplinary Plan of Care Collaboration   IDT Collaboration with  Nursing   Collaboration Comments Pt has transitioned to comfort care, will SIM PT.

## 2023-05-16 NOTE — CARE PLAN
Problem: Skin Integrity  Goal: Skin integrity is maintained or improved  Outcome: Progressing     Problem: Pain - Standard  Goal: Alleviation of pain or a reduction in pain to the patient’s comfort goal  Outcome: Progressing   The patient is Unstable - High likelihood or risk of patient condition declining or worsening; pt is comfort care     Shift Goals  Clinical Goals: comfort  Patient Goals: comfort  Family Goals: not  present    Progress made toward(s) clinical / shift goals:  pt appears comfortable in her sleep. PRN pain medication given once today. Will continue to monitor for signs of pain. Pt is non-verbal at this time.     Patient is not progressing towards the following goals:

## 2023-05-16 NOTE — HOSPICE
Pt has furrowed eyebrows, & grimaces with slightest movement.  Painad score 7/10. BAIRON Dennison notified.

## 2023-05-16 NOTE — CARE PLAN
The patient is Stable - Low risk of patient condition declining or worsening    Shift Goals  Clinical Goals: rest and comfort  Patient Goals: comfort  Family Goals: not  present    Progress made toward(s) clinical / shift goals:  pt is on comfort care, q2h turns, anderson catheter in place, PRN morphine available.      Problem: Skin Integrity  Goal: Skin integrity is maintained or improved  Outcome: Progressing     Problem: Pain - Standard  Goal: Alleviation of pain or a reduction in pain to the patient’s comfort goal  Outcome: Progressing

## 2023-05-16 NOTE — PROGRESS NOTES
Assumed care. Lethargic, pt opened eyes when reposition. Grimmised and moaned when moved. Will medicate for discomfort per mar.

## 2023-05-17 PROBLEM — G31.1 SENILE DEGENERATION OF BRAIN (HCC): Status: ACTIVE | Noted: 2023-01-01

## 2023-05-17 NOTE — PROGRESS NOTES
Pt is non-verbal, on comfort care, no s/sx pain/discomfort at this time. Pt on q2h turns, anderson catheter patent. Call light within reach, personal belongings available, bed in lowest position, treaded socks on, and bed alarm on. Hourly rounding in place.

## 2023-05-17 NOTE — DISCHARGE SUMMARY
This version of the note has been redacted during the course of a chart correction case. If you need access to the original text of this version of the note, please contact the Health Information Management department at [phone].

## 2023-05-17 NOTE — CARE PLAN
The patient is Stable - Low risk of patient condition declining or worsening    Shift Goals  Clinical Goals: comfort  Patient Goals: comfort  Family Goals: VILLA    Progress made toward(s) clinical / shift goals:  pt meds passed per MAR, no injuries this shift      Problem: Knowledge Deficit - Standard  Goal: Patient and family/care givers will demonstrate understanding of plan of care, disease process/condition, diagnostic tests and medications  Outcome: Progressing     Problem: Fall Risk  Goal: Patient will remain free from falls  Outcome: Progressing     Problem: Skin Integrity  Goal: Skin integrity is maintained or improved  Outcome: Progressing     Problem: Pain - Standard  Goal: Alleviation of pain or a reduction in pain to the patient’s comfort goal  Outcome: Progressing       Patient is not progressing towards the following goals:

## 2023-05-17 NOTE — PROGRESS NOTES
Pt report rec'd.  Pt responds to voice, but primarily sleeps in bed.  Pt meds passed per MAR, no injuries this shift.  Pt family @ bedside.  Pt call light in reach, bed at lowest position, q2 turns in place, no needs at this time.

## 2023-05-17 NOTE — HOSPICE
Pt in pain, pain ad 5/10.  Her BP remains high.  Pt has received a dose of morphine at ~08 this AM.  She received 2 doses of morphine yesterday.  BAIRON Hoffman notified via voalte.

## 2023-05-17 NOTE — DISCHARGE SUMMARY
Discharge Summary    CHIEF COMPLAINT ON ADMISSION  Chief Complaint   Patient presents with    Fall     Fell out of bed this morning. Possible seizure (+hx of this). Found on floor by bed by her son Cullen who presents with her to ED today.      Sent by MD     Son called patient's neurologist who recommended that patient be brought to ED.       Reason for Admission  Comfort Care    Admission Date  5/10/2023     CODE STATUS  Prior    HPI & HOSPITAL COURSE    Lela Orozco is a 89 y.o. female who presented 5/10/2023 with past medical history of Alzheimer's dementia, seizure disorder who comes into the emergency room for altered mental status.  She was found on the ground by her son this morning.  Patient has been lethargic for the past 2 weeks and has not been eating well at home.  Patient is unable to answer my questions appropriately.  She was started on Keppra 500 twice daily 1 month prior.  During that time she did have an 24-hour EEG at her neurologist office that found intermittent independent bitemporal slowing.  No focal or generalized epileptic activity noted.  She found to have hypernatremia was started on D5W.    She was originally worked up for encephalopathy, including ruling out infection.  No clear source of infection identified, therefore antibiotics were discontinued.  As patient's condition did not improve with treatment for hypernatremia, discussions with family were to proceed to comfort care measures.  Patient has been evaluated by [facility] and has been accepted to inpatient hospice.        The patient met 2-midnight criteria for an inpatient stay at the time of discharge.      FOLLOW UP ITEMS POST DISCHARGE  Hospice    DISCHARGE DIAGNOSES  Principal Problem:    Acute encephalopathy (POA: Yes)  Active Problems:    Severe protein-calorie malnutrition (HCC) (POA: Unknown)    Acute renal failure superimposed on stage 3 chronic kidney disease (HCC) (POA: Unknown)    Debility (POA: Unknown)     Hypernatremia (POA: Unknown)    Advance care planning (POA: Unknown)  Resolved Problems:    * No resolved hospital problems. *      FOLLOW UP  Future Appointments   Date Time Provider Department Center   6/8/2023 10:20 AM Luis Antonio Ramirez P.A.-C. [facility] Mook Ramirez P.A.-C.  [address]  [place] [address] [phone]          Luis Del Rosario D.O.  [id number] [facility] [address]  [place] [id number] [phone]            MEDICATIONS ON DISCHARGE     Medication List      You have not been prescribed any medications.         Allergies  Allergies   Allergen Reactions    Hydrocodone-Acetaminophen Nausea       DIET  No orders of the defined types were placed in this encounter.      ACTIVITY  As tolerated.  Weight bearing as tolerated    LINES, DRAINS, AND WOUNDS  This is an automated list. Peripheral IVs will be removed prior to discharge.  Peripheral IV 05/10/23 20 G Right Antecubital (Active)   Site Assessment Clean;Dry;Intact 05/16/23 2010   Dressing Type Transparent Film;Tape 05/16/23 2010   Line Status Scrubbed the hub prior to access;Flushed;Saline locked 05/16/23 2010   Dressing Status Dry;Clean;Intact 05/16/23 2010   Dressing Intervention N/A 05/16/23 2010   Dressing Change Due 05/20/23 05/16/23 2010   Infiltration Grading ([facility], [facility]) 0 05/16/23 2010   Phlebitis Scale ([facility] Only) 0 05/16/23 2010       Peripheral IV 05/17/23 20 G Anterior;Right Upper arm (Active)   Site Assessment Clean;Dry;Intact 05/17/23 1000   Dressing Type Transparent 05/17/23 1000   Line Status Blood return noted;Flushed;Saline locked 05/17/23 1000   Dressing Status Clean;Dry;Intact 05/17/23 1000   Dressing Intervention N/A 05/17/23 1000   Dressing Change Due 05/24/23 05/17/23 0938   Infiltration Grading ([facility], [facility]) 0 05/17/23 0938   Phlebitis Scale ([facility] Only) 0 05/17/23 0938     Urethral Catheter 16 Fr. (Active)   Site Assessment Clean;Skin intact 05/16/23 2010   Collection Container Standard  drainage bag 05/16/23 2010   Urinary Catheter Care Drainage Bag Below Bladder Level and Not on Floor;Drainage Bag Not Overfilled 05/13/23 0755   Securement Method Securing device (Describe) 05/16/23 2010   Output (mL) 200 mL 05/17/23 0500      Wound 09/23/21 Incision Hand Left xeroform 4x4 coban (Active)       Peripheral IV 05/10/23 20 G Right Antecubital (Active)   Site Assessment Clean;Dry;Intact 05/16/23 2010   Dressing Type Transparent Film;Tape 05/16/23 2010   Line Status Scrubbed the hub prior to access;Flushed;Saline locked 05/16/23 2010   Dressing Status Dry;Clean;Intact 05/16/23 2010   Dressing Intervention N/A 05/16/23 2010   Dressing Change Due 05/20/23 05/16/23 2010   Infiltration Grading ([facility], [facility]) 0 05/16/23 2010   Phlebitis Scale ([facility] Only) 0 05/16/23 2010       Peripheral IV 05/17/23 20 G Anterior;Right Upper arm (Active)   Site Assessment Clean;Dry;Intact 05/17/23 1000   Dressing Type Transparent 05/17/23 1000   Line Status Blood return noted;Flushed;Saline locked 05/17/23 1000   Dressing Status Clean;Dry;Intact 05/17/23 1000   Dressing Intervention N/A 05/17/23 1000   Dressing Change Due 05/24/23 05/17/23 0938   Infiltration Grading ([facility], [facility]) 0 05/17/23 0938   Phlebitis Scale ([facility] Only) 0 05/17/23 0938           Urethral Catheter 16 Fr. (Active)   Site Assessment Clean;Skin intact 05/16/23 2010   Collection Container Standard drainage bag 05/16/23 2010   Urinary Catheter Care Drainage Bag Below Bladder Level and Not on Floor;Drainage Bag Not Overfilled 05/13/23 0755   Securement Method Securing device (Describe) 05/16/23 2010   Output (mL) 200 mL 05/17/23 0500        MENTAL STATUS ON TRANSFER             CONSULTATIONS  Palliative care    PROCEDURES  none    LABORATORY  Lab Results   Component Value Date    SODIUM 142 05/12/2023    POTASSIUM 3.8 05/12/2023    CHLORIDE 106 05/12/2023    CO2 26 05/12/2023    GLUCOSE 112 (H) 05/12/2023    BUN 30 (H) 05/12/2023    CREATININE 0.87  05/12/2023    CREATININE 1.6 (H) 05/04/2009        Lab Results   Component Value Date    WBC 6.3 05/12/2023    HEMOGLOBIN 13.7 05/12/2023    HEMATOCRIT 43.4 05/12/2023    PLATELETCT 193 05/12/2023        Total time of the discharge process exceeds 40 minutes.
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
## 2023-05-17 NOTE — HOSPICE
Pt approved for GIP by Dr. Del Rosario.  Call to Cullen, he is agreeable to GIP.  Family coming in around noon to sign consents.

## 2023-05-17 NOTE — PROGRESS NOTES
Hospital Medicine Daily Progress Note    Date of Service  5/17/2023    Chief Complaint  Lela Orozco is a 89 y.o. female admitted 5/10/2023 with altered mental status    Hospital Course    Lela Orozco is a 89 y.o. female who presented 5/10/2023 with past medical history of Alzheimer's dementia, seizure disorder who comes into the emergency room for altered mental status.  She was found on the ground by her son this morning.  Patient has been lethargic for the past 2 weeks and has not been eating well at home.  Patient is unable to answer my questions appropriately.  She was started on Keppra 500 twice daily 1 month prior.  During that time she did have an 24-hour EEG at her neurologist office that found intermittent independent bitemporal slowing.  No focal or generalized epileptic activity noted.  She found to hypernatremia I started her on D5W.    Interval Problem Update    05/15/23    I evaluated her at the bedside.  Continue comfort care measures.  Discussed plan of care with case management.    5/16 on CC, appears comfortable    5/17 patient evaluated by [facility], accepted  Family planning to present for consent  Pending discharge to [facility]  Patient with mild discomfort, discussed with RN      I have discussed this patient's plan of care and discharge plan at IDT rounds today with Case Management, Nursing, Nursing leadership, and other members of the IDT team.    Consultants/Specialty  none    Code Status  Comfort Care/DNR    Disposition  Medically Cleared  I have placed the appropriate orders for post-discharge needs.    Review of Systems  Review of Systems   Unable to perform ROS: Mental acuity        Physical Exam  Temp:  [36.2 °C (97.1 °F)-36.2 °C (97.2 °F)] 36.2 °C (97.1 °F)  Pulse:  [] 90  Resp:  [20] 20  BP: (155-172)/() 172/115  SpO2:  [90 %-94 %] 94 %    Physical Exam  Constitutional:       Appearance: She is ill-appearing. She is not diaphoretic.      Comments: frail   Pulmonary:       Comments: Easy, unlabored bs  Skin:     Coloration: Skin is pale.   Neurological:      Motor: Weakness present.       Deferred physical exam as patient is on comfort care.  She is comfortable and sleeping at the time of evaluation.  Fluids  No intake or output data in the 24 hours ending 05/17/23 1413      Laboratory                            Imaging  No orders to display          Assessment/Plan  No new Assessment & Plan notes have been filed under this hospital service since the last note was generated.  Service: Hospital Medicine     Assessment/Plan  * Acute encephalopathy- (present on admission)  Assessment & Plan  Patient found to have acute encephalopathy.  She does not show signs of acute meningitis.  She is following commands and she is does not have neck rigidity.  Her white blood cell count is within normal limits UA and chest x-ray did not show signs of infection.  I discontinued IV antibiotic.  Her acute encephalopathy could be metabolic due to hypernatremia.  I discussed plan of care with bedside RN.  Hyponatremia now resolved.  Plan is to transition her to hospice.  I discussed plan of care with patient's family at the bedside.  Initiated comfort care measures on May 12, 2023  5/16 eval by in hospice pending     Advance care planning  Assessment & Plan  On May 12, 2023 I had a lengthy discussion with patient's son Cullen and his wife at the bedside.  I updated them regarding patient's current medical condition including lab work-up.  After discussion plan is to transition her care to hospice.  Patient family reported that she has significant dementia that has been progressively getting worse for the last 2 years.  I explained family regarding hospice and answered all of their questions.  I placed hospice referral.  I updated charge RN and bedside RN.  Plan is to discontinue lab draws and start her on a diet despite the risk of aspiration.  I answered family questions with best of my knowledge.      Time spent 19 minutes     Hypernatremia  Assessment & Plan  Patient found with significant hypernatremia and her sodium level is trending up.  Ordered D5W.  Hypernatremia now resolved.  Discontinue lab draws as per patient family request.     Debility  Assessment & Plan  Physical therapy evaluation and treatment.     Acute renal failure superimposed on stage 3 chronic kidney disease (HCC)  Assessment & Plan  Likely prerenal  IV fluid hydration  Avoid IV contrast/nephrotoxins/NSAIDs  Dose adjust meds for decreased GFR  I reviewed ultrasound renal did not show any obstructive uropathy but  Initiated comfort care measures on May 12, 2023        Severe protein-calorie malnutrition (HCC)  Assessment & Plan  Dietary consult  IV thiamine  Monitor for refeeding syndrome  Patient found to have difficulty swallowing I placed order for speech therapy evaluation.  Initiated comfort care measures on May 12, 2023        No acute changes in current assessment plan.  Continue comfort care measures on May 15, 2023.  I discussed plan of care with case management.     VTE prophylaxis: heparin ppx     I have performed a physical exam and reviewed and updated ROS and Plan today (5/16/2023). In review of yesterday's note (5/15/2023), there are no changes except as documented above.       Accepted by hospice, pending transfer  On comfort care measures    No acute changes in current assessment plan.  Continue comfort care measures on May 15, 2023.  I discussed plan of care with case management.    VTE prophylaxis: heparin ppx    I have performed a physical exam and reviewed and updated ROS and Plan today (5/17/2023). In review of yesterday's note (5/16/2023), there are no changes except as documented above.

## 2023-05-22 NOTE — DISCHARGE SUMMARY
DISCHARGE SUMMARY     Patient ID:    Name:             Lela Orozco   YOB: 1932  Age:                 89 y.o.  female   MRN:               [id number]      [facility] Admit Date: 2023       Discharge Date: 23    Service: [facility] Hospice Team    Admission/Discharge Diagnoses:   1. End-stage dementia    BRIEF SUMMARY OF [facility] HOSPITAL ADMISSION/STAY:   Lela Orozco is 89 y.o. female with a history of advanced dementia who presented to the hospital with dehydration, generalized weakness and hypernatremia. Diagnostic work-up ruled out infectious process. Etiology determined to be natural prognosis of end-stage dementia. After goals of care conversation, family decided to focus on comfort via hospice benefit. After several days of hospital comfort care, patient required escalating doses of medication for comfort. Hospice consulted for consideration of [facility] level of care for intravenous comfort medications.    DISPOSITION:     CONDITION:     FOLLOW UP:  Bereavement    Luis Del Rosario DO   Hospice and Palliative Care  Hospice Medical Director  [address] [place]  [address]  P: [phone]  F: [phone]  C: [phone]

## 2023-05-25 NOTE — DOCUMENTATION QUERY
"                                                                         [facility]                                                                       Query Response Note      PATIENT:               CHIP MCCORD  ACCT #:                  [id number]  MRN:                     [id number]  :                      1932  ADMIT DATE:       5/10/2023 11:43 AM  DISCH DATE:        2023 1:47 PM  RESPONDING  PROVIDER #:        [id number]           QUERY TEXT:    There is conflicting documentation in the medical record.  Hypernatremia and Hyponatremia have both been documented.      Based on treatment, clinical findings and risk factors, can this documentation be further clarified?    The patient's Clinical Indicators include:  CLINICAL INDICATORS     Recent Labs :    23 1819 23 0159 23 0607  SODIUM                     144                    141                   142    TREATMENT   PN:  -- \"She found to have significant hyponatremia I started on D5W in order to 6 hours sodium checks.\"  --Hypernatremia  Patient found with significant hypernatremia and her sodium level is trending up.  Ordered D5W.  I ordered every 6 hours sodium checks.  I ordered BMP for tomorrow.     PN: Hypernatremia  Patient found with significant hypernatremia and her sodium level is trending up.  Ordered D5W.  Hypernatremia now resolved.  Discontinue lab draws as per patient family request.    RISK FACTORS  -ARBEN  - severe protein calorie malnutrition  - Encephalopahy  - Dehydration   - She was found on the ground by her son this morning     CONTACT  Yi Brock, [facility]  Associate -[email or web address]  Options provided:   -- only hypernatremia exists   -- only hyponatremia exist   -- Unable to determine      Query created by: Yi Brock on 2023 1:56 PM    RESPONSE TEXT:    only hypernatremia exists       QUERY TEXT:    Encephalopathy has been documented in the Medical Record. This "  is unable to determine the specificity of this condition with current provider documentation.  Please specify the type of encephalopathy present on this admission.    NOTE:  If an appropriate response is not listed below, please respond with a new note.    The patient's Clinical Indicators include:  CLINICAL INDICATORS  CT HEAD 5/10: IMPRESSION:    1.No evidence of acute intracranial process.  2.Cerebral atrophy as well as periventricular chronic small vessel ischemic change.    Recent Labs :                    05/11/23 1819 05/12/23 0159 05/12/23 0607  SODIUM             144                   141                           142  POTASSIUM                                                                                     3.8    No focal deficit present.     Mental Status: She is alert. She is disoriented.     Cranial Nerves: No cranial nerve deficit.     Comments: She is following commands.  Psychiatric:        Mood and Affect: Mood normal.        Behavior: Behavior normal.    TREATMENT  * Acute encephalopathy- (present on admission)  Patient found to have acute encephalopathy.  She does not show signs of acute meningitis.  She is following commands and she is does not have neck rigidity.  Her white blood cell count is within normal limits UA and chest x-ray did not show signs of infection.  I discontinued IV antibiotic.  Her acute encephalopathy could be metabolic due to hypernatremia.    --D5W for hypernatremia    RISK FACTORS  -- severe malnutrition; BMI 15.73  --Alzheimer's dementia; hx of seizures  -- ARBEN on CKD stage 3  -- found down at home; fall from bed     CONTACT  Yi Brock CCS  Associate -[email or web address]  Options provided:   -- [Metabolic encephalopathy   -- Toxic encephalopathy   -- Other type of encephalopathy   -- Unable to determine      Query created by: Yi Brock on 5/25/2023 2:06 PM    RESPONSE TEXT:    [Metabolic encephalopathy           Electronically signed by:  AUBREE VOGEL MD 5/25/2023 2:23 PM

## 2023-11-29 NOTE — PROCEDURE: MOHS SURGERY PHONE CONSULTATION
Has The Patient Ever Had A Heart Attack Or Stroke?: No
Pathology Report Dx If Different Than Diagnosis Selected: BCC nod
Has The Pathology Report Been Received?: Yes
Date Of Mohs Surgery: 01/23/2018
If Yes- What Blood Thinners Do They Take?: tylenol as needed for pain
Patient Reported Location: left central lateral neck
Office Location Of Mohs Surgery: [facility]
None
Referring Provider: Lizette
Patient's Insurance: 
Detail Level: Simple

## 2025-01-05 NOTE — PROCEDURE: BIOPSY BY SHAVE METHOD AND DESTRUCTION
Render Post-Care Instructions In Note?: no
Anesthesia Volume In Cc: 2
Size Of Lesion After Curettage: 1
Notification Instructions: Patient will be notified of biopsy results. However, patient instructed to call the office if not contacted within 2 weeks.
Number Of Curettages: 3
Destruction Type: electrodesiccation
Biopsy Type: H and E
Bill As?: Biopsy by Shave Method
Anesthesia Type: 1% lidocaine with epinephrine
Consent: Written consent was obtained and risks were reviewed including but not limited to scarring, infection, bleeding, scabbing, incomplete removal, nerve damage and allergy to anesthesia.
Billing Type: Third-Party Bill
Lab: [id number]
Wound Care: Petrolatum
Size Of Lesion In Cm (Optional): 0
Hemostasis: Drysol
Detail Level: Detailed
Post-Care Instructions: I reviewed with the patient in detail post-care instructions. Patient is to keep the biopsy site dry overnight, and then apply bacitracin twice daily until healed. Patient may apply hydrogen peroxide soaks to remove any crusting.
Lab Facility: 
No
